# Patient Record
Sex: FEMALE | Race: WHITE | NOT HISPANIC OR LATINO | Employment: OTHER | URBAN - METROPOLITAN AREA
[De-identification: names, ages, dates, MRNs, and addresses within clinical notes are randomized per-mention and may not be internally consistent; named-entity substitution may affect disease eponyms.]

---

## 2018-04-17 ENCOUNTER — HOSPITAL ENCOUNTER (OUTPATIENT)
Dept: MAMMOGRAPHY | Facility: HOSPITAL | Age: 78
Discharge: HOME | End: 2018-04-17
Attending: NURSE PRACTITIONER | Admitting: NURSE PRACTITIONER

## 2018-07-30 ENCOUNTER — EPISODE CHANGES (OUTPATIENT)
Dept: CASE MANAGEMENT | Facility: OTHER | Age: 78
End: 2018-07-30

## 2018-07-30 ENCOUNTER — PATIENT OUTREACH (OUTPATIENT)
Dept: CASE MANAGEMENT | Facility: OTHER | Age: 78
End: 2018-07-30

## 2018-07-30 NOTE — OUTREACH NOTE
Care Management Plan 7/30/2018   Lifestyle Goals Decrease falls risk;Eat a healthy diet   Barriers Disease education   Self Management Get flu/pneumonia shot   Annual Wellness Visit:  Patient Will Schedule   Annual Wellness Visit:  Patient alert has been added to [facility] indicating patient is due AWV   Care Gaps Addressed Other (See Comment)   Care Gaps Addressed Care Gaps to be addressed with patient in future sessions.   Specific Disease Process Teaching Heart Disease;Hypertension   Advanced Directives: Not Interested At This Time   Ed Visits past 12 months: 1   Hospitalizations past 12 months 1   Discharged From: [facility]   Discharged to: Home / Self Care   Admit Date: 07/17/2018   Discharge Date: 07/18/2018   Discharge destination: Home     The main concerns and/or symptoms the patient would like to address are: Disease Education.    Education/instruction provided by Care Coordinator: Short session with patient.  Assessment and care plan initiated this date.  Future sessions to include discussion of care gaps, hypertension and heart disease.      Follow Up Outreach Due: Routine    Annika Acevedo RN

## 2019-02-04 ENCOUNTER — HOSPITAL ENCOUNTER (OUTPATIENT)
Dept: PREADMISSION TESTING | Facility: HOSPITAL | Age: 79
Discharge: HOME | End: 2019-02-04
Attending: ORTHOPAEDIC SURGERY | Admitting: ORTHOPAEDIC SURGERY

## 2019-02-04 LAB
ABO + RH BLD: NORMAL
ANION GAP SERPL CALC-SCNC: 14.4 MMOL/L (ref 10–20)
APTT BLD: 24.5 SEC (ref 24–31)
ARMBAND: NORMAL
BACTERIA SPEC AEROBE CULT: NORMAL
BASOPHILS # BLD AUTO: 0.1 10*3/UL (ref 0–0.2)
BASOPHILS NFR BLD AUTO: 1 % (ref 0–2)
BILIRUB UR QL STRIP: NEGATIVE MG/DL
BLD COMPONENT TYPE: NORMAL
BLD GP AB SCN SERPL QL: NEGATIVE
BUN SERPL-MCNC: 11 MG/DL (ref 8–20)
BUN/CREAT SERPL: 12.2 (ref 5.4–26.2)
CALCIUM SERPL-MCNC: 9.3 MG/DL (ref 8.9–10.3)
CASTS URNS QL MICRO: NORMAL /[LPF]
CHLORIDE SERPL-SCNC: 99 MMOL/L (ref 101–111)
COLOR UR: YELLOW
CONV BACTERIA IN URINE MICRO: NEGATIVE
CONV CLARITY OF URINE: CLEAR
CONV CO2: 27 MMOL/L (ref 22–32)
CONV HYALINE CASTS IN URINE MICRO: 0 /[LPF] (ref 0–5)
CONV PROTEIN IN URINE BY AUTOMATED TEST STRIP: NEGATIVE MG/DL
CONV SMALL ROUND CELLS: NORMAL /[HPF]
CONV UROBILINOGEN IN URINE BY AUTOMATED TEST STRIP: 0.2 MG/DL
CREAT UR-MCNC: 0.9 MG/DL (ref 0.4–1)
CROSSMATCH EXPIRATION: NORMAL
CULTURE INDICATED?: NORMAL
DIFFERENTIAL METHOD BLD: (no result)
EOSINOPHIL # BLD AUTO: 0.2 10*3/UL (ref 0–0.3)
EOSINOPHIL # BLD AUTO: 3 % (ref 0–3)
ERYTHROCYTE [DISTWIDTH] IN BLOOD BY AUTOMATED COUNT: 14.7 % (ref 11.5–14.5)
GLUCOSE SERPL-MCNC: 97 MG/DL (ref 65–99)
GLUCOSE UR QL: NEGATIVE MG/DL
HCT VFR BLD AUTO: 45.5 % (ref 35–49)
HGB BLD-MCNC: 15.5 G/DL (ref 12–15)
HGB UR QL STRIP: NEGATIVE
INR PPP: 0.9
KETONES UR QL STRIP: NEGATIVE MG/DL
LEUKOCYTE ESTERASE UR QL STRIP: NEGATIVE
LYMPHOCYTES # BLD AUTO: 1.8 10*3/UL (ref 0.8–4.8)
LYMPHOCYTES NFR BLD AUTO: 27 % (ref 18–42)
Lab: NORMAL
MCH RBC QN AUTO: 30.3 PG (ref 26–32)
MCHC RBC AUTO-ENTMCNC: 34 G/DL (ref 32–36)
MCV RBC AUTO: 89.3 FL (ref 80–94)
MICRO REPORT STATUS: NORMAL
MONOCYTES # BLD AUTO: 0.4 10*3/UL (ref 0.1–1.3)
MONOCYTES NFR BLD AUTO: 6 % (ref 2–11)
NEUTROPHILS # BLD AUTO: 4.3 10*3/UL (ref 2.3–8.6)
NEUTROPHILS NFR BLD AUTO: 63 % (ref 50–75)
NITRITE UR QL STRIP: NEGATIVE
NRBC BLD AUTO-RTO: 0 /100{WBCS}
NRBC/RBC NFR BLD MANUAL: 0 10*3/UL
PH UR STRIP.AUTO: 6.5 [PH] (ref 4.5–8)
PLATELET # BLD AUTO: 198 10*3/UL (ref 150–450)
PMV BLD AUTO: 8.5 FL (ref 7.4–10.4)
POTASSIUM SERPL-SCNC: 3.4 MMOL/L (ref 3.6–5.1)
PROTHROMBIN TIME: 9.5 SEC (ref 9.6–11.7)
RBC # BLD AUTO: 5.1 10*6/UL (ref 4–5.4)
RBC #/AREA URNS HPF: 2 /[HPF] (ref 0–3)
SODIUM SERPL-SCNC: 137 MMOL/L (ref 136–144)
SP GR UR: 1.01 (ref 1–1.03)
SPECIMEN SOURCE: NORMAL
SPERM URNS QL MICRO: NORMAL /[HPF]
SQUAMOUS SPT QL MICRO: 0 /[HPF] (ref 0–5)
UNIDENT CRYS URNS QL MICRO: NORMAL /[HPF]
WBC # BLD AUTO: 6.7 10*3/UL (ref 4.5–11.5)
WBC #/AREA URNS HPF: 0 /[HPF] (ref 0–5)
YEAST SPEC QL WET PREP: NORMAL /[HPF]

## 2019-04-25 ENCOUNTER — HOSPITAL ENCOUNTER (OUTPATIENT)
Dept: LAB | Facility: HOSPITAL | Age: 79
Discharge: HOME | End: 2019-04-25
Attending: NURSE PRACTITIONER | Admitting: NURSE PRACTITIONER

## 2019-04-25 LAB
ALBUMIN SERPL-MCNC: 4 G/DL (ref 3.5–4.8)
ALBUMIN/GLOB SERPL: 1.3 {RATIO} (ref 1–1.7)
ALP SERPL-CCNC: 52 IU/L (ref 32–91)
ALT SERPL-CCNC: 19 IU/L (ref 14–54)
ANION GAP SERPL CALC-SCNC: 12.5 MMOL/L (ref 10–20)
AST SERPL-CCNC: 22 IU/L (ref 15–41)
BASOPHILS # BLD AUTO: 0.1 10*3/UL (ref 0–0.2)
BASOPHILS NFR BLD AUTO: 1 % (ref 0–2)
BILIRUB SERPL-MCNC: 0.6 MG/DL (ref 0.3–1.2)
BUN SERPL-MCNC: 18 MG/DL (ref 8–20)
BUN/CREAT SERPL: 18 (ref 5.4–26.2)
CALCIUM SERPL-MCNC: 9.7 MG/DL (ref 8.9–10.3)
CHLORIDE SERPL-SCNC: 104 MMOL/L (ref 101–111)
CONV CO2: 25 MMOL/L (ref 22–32)
CONV TOTAL PROTEIN: 7.2 G/DL (ref 6.1–7.9)
CREAT UR-MCNC: 1 MG/DL (ref 0.4–1)
CRP SERPL-MCNC: 0.45 MG/DL (ref 0–0.7)
DIFFERENTIAL METHOD BLD: (no result)
EOSINOPHIL # BLD AUTO: 0.2 10*3/UL (ref 0–0.3)
EOSINOPHIL # BLD AUTO: 4 % (ref 0–3)
ERYTHROCYTE [DISTWIDTH] IN BLOOD BY AUTOMATED COUNT: 15.1 % (ref 11.5–14.5)
ERYTHROCYTE [SEDIMENTATION RATE] IN BLOOD BY WESTERGREN METHOD: 17 MM/HR (ref 0–30)
GLOBULIN UR ELPH-MCNC: 3.2 G/DL (ref 2.5–3.8)
GLUCOSE SERPL-MCNC: 102 MG/DL (ref 65–99)
HCT VFR BLD AUTO: 42.4 % (ref 35–49)
HGB BLD-MCNC: 14.1 G/DL (ref 12–15)
LYMPHOCYTES # BLD AUTO: 1.9 10*3/UL (ref 0.8–4.8)
LYMPHOCYTES NFR BLD AUTO: 36 % (ref 18–42)
MCH RBC QN AUTO: 30.4 PG (ref 26–32)
MCHC RBC AUTO-ENTMCNC: 33.3 G/DL (ref 32–36)
MCV RBC AUTO: 91.2 FL (ref 80–94)
MONOCYTES # BLD AUTO: 0.4 10*3/UL (ref 0.1–1.3)
MONOCYTES NFR BLD AUTO: 8 % (ref 2–11)
NEUTROPHILS # BLD AUTO: 2.6 10*3/UL (ref 2.3–8.6)
NEUTROPHILS NFR BLD AUTO: 51 % (ref 50–75)
NRBC BLD AUTO-RTO: 0 /100{WBCS}
NRBC/RBC NFR BLD MANUAL: 0 10*3/UL
PLATELET # BLD AUTO: 202 10*3/UL (ref 150–450)
PMV BLD AUTO: 8.9 FL (ref 7.4–10.4)
POTASSIUM SERPL-SCNC: 3.5 MMOL/L (ref 3.6–5.1)
RBC # BLD AUTO: 4.65 10*6/UL (ref 4–5.4)
SODIUM SERPL-SCNC: 138 MMOL/L (ref 136–144)
WBC # BLD AUTO: 5.2 10*3/UL (ref 4.5–11.5)

## 2019-06-19 ENCOUNTER — OFFICE VISIT (OUTPATIENT)
Dept: FAMILY MEDICINE CLINIC | Facility: CLINIC | Age: 79
End: 2019-06-19

## 2019-06-19 VITALS
DIASTOLIC BLOOD PRESSURE: 81 MMHG | HEIGHT: 62 IN | WEIGHT: 206 LBS | HEART RATE: 75 BPM | SYSTOLIC BLOOD PRESSURE: 145 MMHG | OXYGEN SATURATION: 97 % | BODY MASS INDEX: 37.91 KG/M2 | TEMPERATURE: 98.1 F

## 2019-06-19 DIAGNOSIS — E55.9 VITAMIN D DEFICIENCY: ICD-10-CM

## 2019-06-19 DIAGNOSIS — Z00.00 PREVENTATIVE HEALTH CARE: ICD-10-CM

## 2019-06-19 DIAGNOSIS — E55.9 VITAMIN D DEFICIENCY: Primary | ICD-10-CM

## 2019-06-19 DIAGNOSIS — M06.9 RHEUMATOID ARTHRITIS, INVOLVING UNSPECIFIED SITE, UNSPECIFIED RHEUMATOID FACTOR PRESENCE: ICD-10-CM

## 2019-06-19 DIAGNOSIS — E87.6 HYPOKALEMIA: Primary | ICD-10-CM

## 2019-06-19 DIAGNOSIS — E78.2 MIXED HYPERLIPIDEMIA: ICD-10-CM

## 2019-06-19 PROBLEM — R79.89 ELEVATED SERUM CREATININE: Status: ACTIVE | Noted: 2018-03-27

## 2019-06-19 PROBLEM — R11.0 NAUSEA: Status: ACTIVE | Noted: 2018-07-23

## 2019-06-19 PROBLEM — R53.83 FATIGUE: Status: ACTIVE | Noted: 2018-07-23

## 2019-06-19 PROBLEM — I25.10 CORONARY ARTERY DISEASE: Status: ACTIVE | Noted: 2019-06-19

## 2019-06-19 PROBLEM — E78.5 HYPERLIPIDEMIA: Status: ACTIVE | Noted: 2019-06-19

## 2019-06-19 PROBLEM — E03.9 HYPOTHYROIDISM: Status: ACTIVE | Noted: 2019-06-19

## 2019-06-19 PROBLEM — G47.00 INSOMNIA: Status: ACTIVE | Noted: 2018-03-27

## 2019-06-19 PROBLEM — N32.81 OVERACTIVE BLADDER: Status: ACTIVE | Noted: 2018-03-27

## 2019-06-19 PROCEDURE — 99214 OFFICE O/P EST MOD 30 MIN: CPT | Performed by: NURSE PRACTITIONER

## 2019-06-19 RX ORDER — OMEPRAZOLE 20 MG/1
20 TABLET, DELAYED RELEASE ORAL EVERY 24 HOURS
COMMUNITY
Start: 2018-07-23 | End: 2022-02-28

## 2019-06-19 RX ORDER — LEVOTHYROXINE SODIUM 0.05 MG/1
50 TABLET ORAL DAILY
Refills: 0 | COMMUNITY
Start: 2019-03-25 | End: 2019-09-19 | Stop reason: SDUPTHER

## 2019-06-19 RX ORDER — LISINOPRIL AND HYDROCHLOROTHIAZIDE 12.5; 1 MG/1; MG/1
TABLET ORAL EVERY 24 HOURS
COMMUNITY
Start: 2017-08-07 | End: 2019-11-15 | Stop reason: SDUPTHER

## 2019-06-19 RX ORDER — HYDROXYCHLOROQUINE SULFATE 200 MG/1
200 TABLET, FILM COATED ORAL 2 TIMES DAILY
Refills: 0 | COMMUNITY
Start: 2019-04-26 | End: 2020-08-18 | Stop reason: SDUPTHER

## 2019-06-19 RX ORDER — MULTIVIT-MIN/IRON/FOLIC ACID/K 18-600-40
500 CAPSULE ORAL DAILY
COMMUNITY
Start: 2015-05-11

## 2019-06-19 RX ORDER — ONDANSETRON HYDROCHLORIDE 8 MG/1
8 TABLET, FILM COATED ORAL 3 TIMES DAILY PRN
Refills: 0 | COMMUNITY
Start: 2019-03-22 | End: 2022-02-28

## 2019-06-19 RX ORDER — CHOLECALCIFEROL (VITAMIN D3) 125 MCG
5 CAPSULE ORAL DAILY
COMMUNITY
Start: 2018-03-27 | End: 2022-02-28

## 2019-06-19 RX ORDER — MELOXICAM 7.5 MG/1
7.5 TABLET ORAL 2 TIMES DAILY PRN
COMMUNITY
Start: 2018-01-23 | End: 2019-06-19

## 2019-06-19 RX ORDER — RANITIDINE HCL 75 MG
75 TABLET ORAL EVERY 12 HOURS PRN
COMMUNITY
Start: 2018-07-23 | End: 2022-02-28

## 2019-06-19 RX ORDER — MULTIVIT-MIN/IRON/FOLIC ACID/K 18-600-40
4000 CAPSULE ORAL DAILY
COMMUNITY
Start: 2018-10-16 | End: 2019-06-19

## 2019-06-19 RX ORDER — CHOLECALCIFEROL (VITAMIN D3) 125 MCG
4000 CAPSULE ORAL DAILY
Qty: 60 TABLET | Refills: 6 | Status: SHIPPED | OUTPATIENT
Start: 2019-06-19 | End: 2019-06-19

## 2019-06-19 RX ORDER — ACETAMINOPHEN 160 MG
4000 TABLET,DISINTEGRATING ORAL DAILY
Qty: 60 CAPSULE | Refills: 6 | Status: SHIPPED | OUTPATIENT
Start: 2019-06-19 | End: 2020-06-18

## 2019-06-19 RX ORDER — ACETAMINOPHEN 325 MG/1
325 TABLET ORAL EVERY 6 HOURS PRN
COMMUNITY
Start: 2012-06-20

## 2019-06-19 RX ORDER — MULTIVITAMIN
1 CAPSULE ORAL DAILY
COMMUNITY
Start: 2014-06-04

## 2019-06-19 RX ORDER — ROSUVASTATIN CALCIUM 10 MG/1
10 TABLET, COATED ORAL
Refills: 0 | COMMUNITY
Start: 2019-03-25 | End: 2019-12-19 | Stop reason: SDUPTHER

## 2019-06-19 NOTE — PROGRESS NOTES
Subjective   Dannielle Wang is a 79 y.o. female.     78-year-old white female with history of CAD, hypertension, TIA, return arthritis today for six-month follow-up visit.  Patient denies any new problems she has had a left knee replacement since I saw her last which has done very well she is no longer using meloxicam.  Blood pressure 144/80 heart rate 96 she denies any chest pain, dyspnea, tachycardia, dizziness or edema  Weight is unchanged at 206.  Patient had blood work at rheumatology in April showing low potassium and low D3 I am checking  potassium again today and increasing her D3 to 4000 units  Patient is up-to-date on eye exams and DEXA scan's no longer does mammograms Pap smears or colonoscopies.  Fasting blood work due in the fall    D3 4000 units daily  CMP today         The following portions of the patient's history were reviewed and updated as appropriate: allergies, current medications, past family history, past medical history, past social history, past surgical history and problem list.    Review of Systems    Objective   Physical Exam   Constitutional: She is oriented to person, place, and time. She appears well-developed and well-nourished.   Cardiovascular: Normal rate and regular rhythm.   Pulmonary/Chest: Effort normal and breath sounds normal.   Abdominal: Soft. Bowel sounds are normal.   Neurological: She is alert and oriented to person, place, and time.   Skin: Skin is warm and dry.   Psychiatric: She has a normal mood and affect.         Assessment/Plan   Dannielle was seen today for hypertension, hyperlipidemia and hypothyroidism.    Diagnoses and all orders for this visit:    Hypokalemia  -     Comprehensive Metabolic Panel    Vitamin D deficiency    Rheumatoid arthritis, involving unspecified site, unspecified rheumatoid factor presence (CMS/[facility])    Preventative health care    Mixed hyperlipidemia

## 2019-06-20 ENCOUNTER — TELEPHONE (OUTPATIENT)
Dept: FAMILY MEDICINE CLINIC | Facility: CLINIC | Age: 79
End: 2019-06-20

## 2019-06-20 DIAGNOSIS — N28.9 RENAL INSUFFICIENCY: Primary | ICD-10-CM

## 2019-06-20 LAB
ALBUMIN SERPL-MCNC: 4.4 G/DL (ref 3.5–4.7)
ALBUMIN/GLOB SERPL: 1.6 {RATIO} (ref 1.2–2.2)
ALP SERPL-CCNC: 59 IU/L (ref 39–117)
ALT SERPL-CCNC: 19 IU/L (ref 0–32)
AST SERPL-CCNC: 20 IU/L (ref 0–40)
BILIRUB SERPL-MCNC: 0.5 MG/DL (ref 0–1.2)
BUN SERPL-MCNC: 15 MG/DL (ref 8–27)
BUN/CREAT SERPL: 14 (ref 12–28)
CALCIUM SERPL-MCNC: 9.5 MG/DL (ref 8.7–10.3)
CHLORIDE SERPL-SCNC: 102 MMOL/L (ref 96–106)
CO2 SERPL-SCNC: 25 MMOL/L (ref 20–29)
CREAT SERPL-MCNC: 1.08 MG/DL (ref 0.57–1)
GLOBULIN SER CALC-MCNC: 2.8 G/DL (ref 1.5–4.5)
GLUCOSE SERPL-MCNC: 87 MG/DL (ref 65–99)
POTASSIUM SERPL-SCNC: 3.9 MMOL/L (ref 3.5–5.2)
PROT SERPL-MCNC: 7.2 G/DL (ref 6–8.5)
SODIUM SERPL-SCNC: 143 MMOL/L (ref 134–144)

## 2019-07-21 ENCOUNTER — RESULTS ENCOUNTER (OUTPATIENT)
Dept: FAMILY MEDICINE CLINIC | Facility: CLINIC | Age: 79
End: 2019-07-21

## 2019-07-21 DIAGNOSIS — N28.9 RENAL INSUFFICIENCY: ICD-10-CM

## 2019-08-27 ENCOUNTER — OFFICE VISIT (OUTPATIENT)
Dept: CARDIOLOGY | Facility: CLINIC | Age: 79
End: 2019-08-27

## 2019-08-27 VITALS
BODY MASS INDEX: 35 KG/M2 | DIASTOLIC BLOOD PRESSURE: 80 MMHG | SYSTOLIC BLOOD PRESSURE: 143 MMHG | OXYGEN SATURATION: 97 % | HEART RATE: 75 BPM | WEIGHT: 205 LBS | HEIGHT: 64 IN

## 2019-08-27 DIAGNOSIS — I63.40 CEREBROVASCULAR ACCIDENT (CVA) DUE TO EMBOLISM OF CEREBRAL ARTERY (HCC): ICD-10-CM

## 2019-08-27 DIAGNOSIS — E78.00 PURE HYPERCHOLESTEROLEMIA: ICD-10-CM

## 2019-08-27 DIAGNOSIS — I25.118 CORONARY ARTERY DISEASE OF NATIVE ARTERY OF NATIVE HEART WITH STABLE ANGINA PECTORIS (HCC): Primary | ICD-10-CM

## 2019-08-27 DIAGNOSIS — I10 ESSENTIAL HYPERTENSION: ICD-10-CM

## 2019-08-27 PROCEDURE — 99213 OFFICE O/P EST LOW 20 MIN: CPT | Performed by: INTERNAL MEDICINE

## 2019-08-27 NOTE — PROGRESS NOTES
"    Subjective:     Encounter Date:08/27/2019      Patient ID: Dannielle Wang is a 79 y.o. female.    Chief Complaint:  History of Present Illness 79-year-old white female with history of coronary artery disease history of hypertension hyperlipidemia and CVA presents to my office for follow-up.  Patient is currently stable without any symptoms of chest pain or shortness of breath at rest on exertion.  No complaints of any PND orthopnea.  No palpitations dizziness syncope or swelling of the feet.  Patient has been taking all the medicines regularly.  Patient does not smoke.  She is trying to follow a good diet.    The following portions of the patient's history were reviewed and updated as appropriate: allergies, current medications, past family history, past medical history, past social history, past surgical history and problem list.  Past Medical History:   Diagnosis Date   • Coronary artery disease    • Hyperlipidemia    • Hypertension    • Hypothyroidism      Past Surgical History:   Procedure Laterality Date   • JOINT REPLACEMENT     • REPLACEMENT TOTAL KNEE       /80 (BP Location: Left arm, Patient Position: Sitting)   Pulse 75   Ht 162.6 cm (64\")   Wt 93 kg (205 lb)   LMP  (LMP Unknown)   SpO2 97%   BMI 35.19 kg/m²   Family History   Problem Relation Age of Onset   • Hypertension Mother    • Stroke Mother    • Heart disease Father        Current Outpatient Medications:   •  acetaminophen (TYLENOL) 325 MG tablet, Take 325 mg by mouth Every 6 (Six) Hours As Needed., Disp: , Rfl:   •  Ascorbic Acid (VITAMIN C) 500 MG capsule, Take 500 mg by mouth Daily., Disp: , Rfl:   •  calcium carbonate-vitamin d 600-400 MG-UNIT per tablet, Take 600 mg by mouth 2 (Two) Times a Day., Disp: , Rfl:   •  Cholecalciferol (VITAMIN D3) 2000 units capsule, Take 2 capsules by mouth Daily., Disp: 60 capsule, Rfl: 6  •  hydroxychloroquine (PLAQUENIL) 200 MG tablet, Take 200 mg by mouth 2 (Two) Times a Day., Disp: , Rfl: " 0  •  levothyroxine (SYNTHROID, LEVOTHROID) 50 MCG tablet, Take 50 mcg by mouth Daily., Disp: , Rfl: 0  •  lisinopril-hydrochlorothiazide (PRINZIDE,ZESTORETIC) 10-12.5 MG per tablet, Daily., Disp: , Rfl:   •  Multiple Vitamin (MULTIVITAMIN) capsule, Take 1 capsule by mouth Daily., Disp: , Rfl:   •  raNITIdine (ZANTAC) 75 MG tablet, Take 75 mg by mouth Every 12 (Twelve) Hours As Needed., Disp: , Rfl:   •  rosuvastatin (CRESTOR) 10 MG tablet, Take 10 mg by mouth every night at bedtime., Disp: , Rfl: 0  •  melatonin 5 MG tablet tablet, Take 5 mg by mouth Daily., Disp: , Rfl:   •  omeprazole OTC (PRILOSEC OTC) 20 MG EC tablet, Take 20 mg by mouth Daily., Disp: , Rfl:   •  ondansetron (ZOFRAN) 8 MG tablet, Take 8 mg by mouth 3 (Three) Times a Day As Needed., Disp: , Rfl: 0  Allergies   Allergen Reactions   • Atorvastatin Calcium Myalgia   • Cephalexin Unknown (See Comments)     Social History     Socioeconomic History   • Marital status:      Spouse name: Not on file   • Number of children: Not on file   • Years of education: Not on file   • Highest education level: Not on file   Tobacco Use   • Smoking status: Never Smoker   • Smokeless tobacco: Never Used   Substance and Sexual Activity   • Alcohol use: No     Frequency: Never   • Drug use: No   • Sexual activity: No     Review of Systems   Constitution: Negative for fever and malaise/fatigue.   Cardiovascular: Negative for chest pain, dyspnea on exertion and palpitations.   Respiratory: Negative for cough and shortness of breath.    Skin: Negative for rash.   Gastrointestinal: Negative for abdominal pain, nausea and vomiting.   Neurological: Negative for focal weakness and headaches.   All other systems reviewed and are negative.             Objective:     Physical Exam   Constitutional: She appears well-developed and well-nourished.   HENT:   Head: Normocephalic and atraumatic.   Eyes: Conjunctivae are normal. No scleral icterus.   Neck: Normal range of  motion. Neck supple. No JVD present. Carotid bruit is not present.   Cardiovascular: Normal rate, regular rhythm, S1 normal, S2 normal, normal heart sounds and intact distal pulses. PMI is not displaced.   Pulmonary/Chest: Effort normal and breath sounds normal. She has no wheezes. She has no rales.   Abdominal: Soft. Bowel sounds are normal.   Neurological: She is alert. She has normal strength.   Skin: Skin is warm and dry. No rash noted.     Procedures    Lab Review:       Assessment:          Diagnosis Plan   1. Coronary artery disease of native artery of native heart with stable angina pectoris (CMS/HCC)     2. Essential hypertension     3. Pure hypercholesterolemia     4. Cerebrovascular accident (CVA) due to embolism of cerebral artery (CMS/HCC)            Plan:       Patient has nonobstructive coronary disease and is currently stable on medications per  Patient blood pressure and heart rate are stable per  Patient lipid levels are followed by the primary care doctor.  Patient has history of TIA/CVA in the past but does not have any residual deficits.  Continue current medicines and follow in 1 year

## 2019-09-19 RX ORDER — LEVOTHYROXINE SODIUM 0.05 MG/1
TABLET ORAL
Qty: 90 TABLET | Refills: 1 | Status: SHIPPED | OUTPATIENT
Start: 2019-09-19 | End: 2020-03-18

## 2019-10-17 DIAGNOSIS — M06.9 RHEUMATOID ARTHRITIS, INVOLVING UNSPECIFIED SITE, UNSPECIFIED RHEUMATOID FACTOR PRESENCE: Primary | ICD-10-CM

## 2019-11-15 RX ORDER — LISINOPRIL AND HYDROCHLOROTHIAZIDE 12.5; 1 MG/1; MG/1
1 TABLET ORAL DAILY
Qty: 90 TABLET | Refills: 0 | Status: SHIPPED | OUTPATIENT
Start: 2019-11-15 | End: 2020-02-13 | Stop reason: SDUPTHER

## 2019-12-19 ENCOUNTER — OFFICE VISIT (OUTPATIENT)
Dept: FAMILY MEDICINE CLINIC | Facility: CLINIC | Age: 79
End: 2019-12-19

## 2019-12-19 VITALS
OXYGEN SATURATION: 95 % | BODY MASS INDEX: 38.83 KG/M2 | SYSTOLIC BLOOD PRESSURE: 132 MMHG | HEIGHT: 62 IN | DIASTOLIC BLOOD PRESSURE: 72 MMHG | TEMPERATURE: 98.3 F | HEART RATE: 86 BPM | WEIGHT: 211 LBS

## 2019-12-19 DIAGNOSIS — Z23 FLU VACCINE NEED: Primary | ICD-10-CM

## 2019-12-19 DIAGNOSIS — E03.9 ACQUIRED HYPOTHYROIDISM: ICD-10-CM

## 2019-12-19 DIAGNOSIS — E66.01 MORBIDLY OBESE (HCC): ICD-10-CM

## 2019-12-19 DIAGNOSIS — I25.118 CORONARY ARTERY DISEASE OF NATIVE ARTERY OF NATIVE HEART WITH STABLE ANGINA PECTORIS (HCC): ICD-10-CM

## 2019-12-19 DIAGNOSIS — E55.9 VITAMIN D DEFICIENCY: ICD-10-CM

## 2019-12-19 PROCEDURE — 90653 IIV ADJUVANT VACCINE IM: CPT | Performed by: NURSE PRACTITIONER

## 2019-12-19 PROCEDURE — 99213 OFFICE O/P EST LOW 20 MIN: CPT | Performed by: NURSE PRACTITIONER

## 2019-12-19 PROCEDURE — G0008 ADMIN INFLUENZA VIRUS VAC: HCPCS | Performed by: NURSE PRACTITIONER

## 2019-12-19 RX ORDER — ROSUVASTATIN CALCIUM 10 MG/1
TABLET, COATED ORAL
Qty: 90 TABLET | Refills: 2 | Status: SHIPPED | OUTPATIENT
Start: 2019-12-19 | End: 2019-12-23 | Stop reason: SDUPTHER

## 2019-12-19 RX ORDER — ROSUVASTATIN CALCIUM 10 MG/1
10 TABLET, COATED ORAL
Qty: 30 TABLET | Refills: 2 | Status: SHIPPED | OUTPATIENT
Start: 2019-12-19 | End: 2020-03-16 | Stop reason: SDUPTHER

## 2019-12-19 NOTE — PROGRESS NOTES
Subjective   Dannielle Wang is a 79 y.o. female.      79-year-old white female with history of CAD, hypertension, TIA, arthritis, who comes in today for follow-up visit.  Patient has no new complaints.  She has had a find a new rheumatologist and she is going to 1 over in [place] now  Blood pressure 136/72 heart rate 86 she denies any chest pain, dyspnea, tachycardia dizziness since he  Weight is up 5 lb at 211.  We are checking her vitamin D level today thyroid levels and creatinine which was 1.08 slightly elevated last visit  Patient is up-to-date on DEXA scan , eye exam and flu shot but she no longer does mammograms Pap smears and refuses colonoscopies      blood work today   keep all appointments with Endocrinology       The following portions of the patient's history were reviewed and updated as appropriate: allergies, current medications, past family history, past medical history, past social history, past surgical history and problem list.    Review of Systems   Constitutional: Negative.    HENT: Negative.    Respiratory: Negative.    Cardiovascular: Negative.    Gastrointestinal: Negative.    Genitourinary: Negative.    Musculoskeletal: Negative.    Skin: Negative.    Neurological: Negative.    Psychiatric/Behavioral: Negative.        Objective   Physical Exam   Constitutional: She is oriented to person, place, and time. She appears well-developed and well-nourished.   Cardiovascular: Normal rate and regular rhythm.   Pulmonary/Chest: Effort normal and breath sounds normal.   Abdominal: Soft. Bowel sounds are normal.   Neurological: She is alert and oriented to person, place, and time.   Skin: Skin is warm and dry.   Psychiatric: She has a normal mood and affect.         Assessment/Plan   Dannielle was seen today for hypertension.    Diagnoses and all orders for this visit:    Flu vaccine need  -     Fluad Quad >65 years    Acquired hypothyroidism  -     Cancel: T3  -     Cancel: TSH+Free T4  -      Comprehensive metabolic panel  -     TSH  -     T4  -     T3, free    Vitamin D deficiency  -     Cancel: Vitamin D 1,25 Dihydroxy  -     Vitamin D 25 hydroxy    Morbidly obese (CMS/HCC)    Coronary artery disease of native artery of native heart with stable angina pectoris (CMS/HCC)  -     Comprehensive metabolic panel    Other orders  -     rosuvastatin (CRESTOR) 10 MG tablet; Take 1 tablet by mouth every night at bedtime.

## 2019-12-20 LAB
25(OH)D3+25(OH)D2 SERPL-MCNC: 33.5 NG/ML (ref 30–100)
ALBUMIN SERPL-MCNC: 4.5 G/DL (ref 3.5–4.7)
ALBUMIN/GLOB SERPL: 1.7 {RATIO} (ref 1.2–2.2)
ALP SERPL-CCNC: 55 IU/L (ref 39–117)
ALT SERPL-CCNC: 20 IU/L (ref 0–32)
AST SERPL-CCNC: 22 IU/L (ref 0–40)
BILIRUB SERPL-MCNC: 0.4 MG/DL (ref 0–1.2)
BUN SERPL-MCNC: 15 MG/DL (ref 8–27)
BUN/CREAT SERPL: 14 (ref 12–28)
CALCIUM SERPL-MCNC: 10.1 MG/DL (ref 8.7–10.3)
CHLORIDE SERPL-SCNC: 101 MMOL/L (ref 96–106)
CO2 SERPL-SCNC: 26 MMOL/L (ref 20–29)
CREAT SERPL-MCNC: 1.05 MG/DL (ref 0.57–1)
GLOBULIN SER CALC-MCNC: 2.7 G/DL (ref 1.5–4.5)
GLUCOSE SERPL-MCNC: 111 MG/DL (ref 65–99)
POTASSIUM SERPL-SCNC: 3.9 MMOL/L (ref 3.5–5.2)
PROT SERPL-MCNC: 7.2 G/DL (ref 6–8.5)
SODIUM SERPL-SCNC: 144 MMOL/L (ref 134–144)
T3FREE SERPL-MCNC: 2.7 PG/ML (ref 2–4.4)
T4 SERPL-MCNC: 9.1 UG/DL (ref 4.5–12)
TSH SERPL DL<=0.005 MIU/L-ACNC: 1.68 UIU/ML (ref 0.45–4.5)

## 2019-12-23 RX ORDER — ROSUVASTATIN CALCIUM 10 MG/1
10 TABLET, COATED ORAL DAILY
Qty: 90 TABLET | Refills: 2 | Status: SHIPPED | OUTPATIENT
Start: 2019-12-23 | End: 2019-12-23 | Stop reason: SDUPTHER

## 2020-02-13 RX ORDER — LISINOPRIL AND HYDROCHLOROTHIAZIDE 12.5; 1 MG/1; MG/1
1 TABLET ORAL DAILY
Qty: 90 TABLET | Refills: 0 | Status: SHIPPED | OUTPATIENT
Start: 2020-02-13 | End: 2020-05-18

## 2020-03-16 RX ORDER — ROSUVASTATIN CALCIUM 10 MG/1
10 TABLET, COATED ORAL
Qty: 90 TABLET | Refills: 1 | Status: SHIPPED | OUTPATIENT
Start: 2020-03-16 | End: 2020-09-11

## 2020-03-18 ENCOUNTER — TELEPHONE (OUTPATIENT)
Dept: FAMILY MEDICINE CLINIC | Facility: CLINIC | Age: 80
End: 2020-03-18

## 2020-03-18 RX ORDER — LEVOTHYROXINE SODIUM 0.05 MG/1
TABLET ORAL
Qty: 90 TABLET | Refills: 1 | Status: SHIPPED | OUTPATIENT
Start: 2020-03-18 | End: 2020-09-11

## 2020-05-18 RX ORDER — LISINOPRIL AND HYDROCHLOROTHIAZIDE 12.5; 1 MG/1; MG/1
TABLET ORAL
Qty: 90 TABLET | Refills: 1 | Status: SHIPPED | OUTPATIENT
Start: 2020-05-18 | End: 2020-12-08

## 2020-06-25 ENCOUNTER — OFFICE VISIT (OUTPATIENT)
Dept: FAMILY MEDICINE CLINIC | Facility: CLINIC | Age: 80
End: 2020-06-25

## 2020-06-25 VITALS
WEIGHT: 213 LBS | HEIGHT: 62 IN | HEART RATE: 82 BPM | TEMPERATURE: 98.6 F | OXYGEN SATURATION: 95 % | DIASTOLIC BLOOD PRESSURE: 84 MMHG | BODY MASS INDEX: 39.2 KG/M2 | SYSTOLIC BLOOD PRESSURE: 152 MMHG

## 2020-06-25 DIAGNOSIS — M85.80 OSTEOPENIA, UNSPECIFIED LOCATION: ICD-10-CM

## 2020-06-25 DIAGNOSIS — Z00.00 PREVENTATIVE HEALTH CARE: ICD-10-CM

## 2020-06-25 DIAGNOSIS — Z78.0 POSTMENOPAUSAL: ICD-10-CM

## 2020-06-25 DIAGNOSIS — E78.2 MIXED HYPERLIPIDEMIA: Primary | ICD-10-CM

## 2020-06-25 DIAGNOSIS — E03.9 ACQUIRED HYPOTHYROIDISM: ICD-10-CM

## 2020-06-25 PROCEDURE — 99213 OFFICE O/P EST LOW 20 MIN: CPT | Performed by: NURSE PRACTITIONER

## 2020-06-25 NOTE — PATIENT INSTRUCTIONS
Fasting blood work  Keep appointment for DEXA scan  Continue to monitor blood pressure at home  Follow-up 6 months fasting

## 2020-06-25 NOTE — PROGRESS NOTES
"    Dannielle Wang is a 80 y.o. female.     80-year-old white female with history of CAD, hypertension, TIA, arthritis, bunions who comes in today for follow-up visit  Blood pressure 152/84 heart rate 82 with murmur she denies any chest pain, dyspnea, tachycardia or dizziness.  Patient states her pressure at home runs in the 120s 130s over 70s does not want any intervention for blood pressure today  Weight is up 2 pounds at 213 we discussed diet and weight loss  We are scheduling a DEXA scan for the patient she is up-to-date on eye exams no longer does mammograms or colonoscopies    Fasting blood work  DEXA scan  Follow-up fasting 6 months       The following portions of the patient's history were reviewed and updated as appropriate: allergies, current medications, past family history, past medical history, past social history, past surgical history and problem list.    Vitals:    06/25/20 1400   BP: 152/84   BP Location: Right arm   Patient Position: Sitting   Cuff Size: Large Adult   Pulse: 82   Temp: 98.6 °F (37 °C)   TempSrc: Temporal   SpO2: 95%   Weight: 96.6 kg (213 lb)   Height: 157.5 cm (62\")     Body mass index is 38.96 kg/m².    Past Medical History:   Diagnosis Date   • Coronary artery disease    • Hyperlipidemia    • Hypertension    • Hypothyroidism      Past Surgical History:   Procedure Laterality Date   • JOINT REPLACEMENT     • REPLACEMENT TOTAL KNEE       Family History   Problem Relation Age of Onset   • Hypertension Mother    • Stroke Mother    • Heart disease Father      Immunization History   Administered Date(s) Administered   • Fluad Quad 12/19/2019   • Fluzone High Dose =>65 Years ([employer] ONLY) 09/26/2016, 10/16/2018   • PPD Test 06/02/2004       Office Visit on 12/19/2019   Component Date Value Ref Range Status   • Glucose 12/19/2019 111* 65 - 99 mg/dL Final   • BUN 12/19/2019 15  8 - 27 mg/dL Final   • Creatinine 12/19/2019 1.05* 0.57 - 1.00 mg/dL Final   • eGFR Non African Am " 12/19/2019 50* >59 mL/min/1.73 Final   • eGFR African Am 12/19/2019 58* >59 mL/min/1.73 Final   • BUN/Creatinine Ratio 12/19/2019 14  12 - 28 Final   • Sodium 12/19/2019 144  134 - 144 mmol/L Final   • Potassium 12/19/2019 3.9  3.5 - 5.2 mmol/L Final   • Chloride 12/19/2019 101  96 - 106 mmol/L Final   • Total CO2 12/19/2019 26  20 - 29 mmol/L Final   • Calcium 12/19/2019 10.1  8.7 - 10.3 mg/dL Final   • Total Protein 12/19/2019 7.2  6.0 - 8.5 g/dL Final   • Albumin 12/19/2019 4.5  3.5 - 4.7 g/dL Final   • Globulin 12/19/2019 2.7  1.5 - 4.5 g/dL Final   • A/G Ratio 12/19/2019 1.7  1.2 - 2.2 Final   • Total Bilirubin 12/19/2019 0.4  0.0 - 1.2 mg/dL Final   • Alkaline Phosphatase 12/19/2019 55  39 - 117 IU/L Final   • AST (SGOT) 12/19/2019 22  0 - 40 IU/L Final   • ALT (SGPT) 12/19/2019 20  0 - 32 IU/L Final   • TSH 12/19/2019 1.680  0.450 - 4.500 uIU/mL Final   • T4, Total 12/19/2019 9.1  4.5 - 12.0 ug/dL Final   • T3, Free 12/19/2019 2.7  2.0 - 4.4 pg/mL Final   • 25 Hydroxy, Vitamin D 12/19/2019 33.5  30.0 - 100.0 ng/mL Final    Comment: Vitamin D deficiency has been defined by the [place] of  Medicine and an Endocrine Society practice guideline as a  level of serum 25-OH vitamin D less than 20 ng/mL (1,2).  The Endocrine Society went on to further define vitamin D  insufficiency as a level between 21 and 29 ng/mL (2).  1. IOM ([place] of [employer]). 2010. Dietary reference     intakes for calcium and D. [place]: The     National Academies Press.  2. Brendan MF, Juan José TERRY, Casper ROSS, et al.     Evaluation, treatment, and prevention of vitamin D     deficiency: an Endocrine Society clinical practice     guideline. JCEM. 2011 Jul; 96(7):1911-30.           Review of Systems   Constitutional: Negative.    HENT: Negative.    Respiratory: Negative.    Cardiovascular: Negative.    Gastrointestinal: Negative.    Genitourinary: Negative.    Musculoskeletal: Negative.    Skin: Negative.    Neurological:  Negative.    Psychiatric/Behavioral: Negative.        Objective   Physical Exam   Constitutional: She is oriented to person, place, and time. She appears well-developed and well-nourished.   Cardiovascular: Normal rate and regular rhythm.   Pulmonary/Chest: Breath sounds normal.   Abdominal: Soft. Bowel sounds are normal.   Musculoskeletal: Normal range of motion.   Neurological: She is alert and oriented to person, place, and time.   Skin: Skin is warm and dry.   Psychiatric: She has a normal mood and affect.       Procedures    Assessment/Plan   Dannielle was seen today for hyperlipidemia and hypothyroidism.    Diagnoses and all orders for this visit:    Mixed hyperlipidemia  -     Lipid Panel With LDL / HDL Ratio    Acquired hypothyroidism  -     TSH+Free T4  -     T3    Preventative health care  -     CBC & Differential  -     Comprehensive Metabolic Panel    Osteopenia, unspecified location    BMI 39.0-39.9,adult          Current Outpatient Medications:   •  acetaminophen (TYLENOL) 325 MG tablet, Take 325 mg by mouth Every 6 (Six) Hours As Needed., Disp: , Rfl:   •  Ascorbic Acid (VITAMIN C) 500 MG capsule, Take 500 mg by mouth Daily., Disp: , Rfl:   •  calcium carbonate-vitamin d 600-400 MG-UNIT per tablet, Take 600 mg by mouth 2 (Two) Times a Day., Disp: , Rfl:   •  hydroxychloroquine (PLAQUENIL) 200 MG tablet, Take 200 mg by mouth 2 (Two) Times a Day., Disp: , Rfl: 0  •  levothyroxine (SYNTHROID, LEVOTHROID) 50 MCG tablet, TAKE 1 TABLET BY MOUTH ONCE DAILY, Disp: 90 tablet, Rfl: 1  •  lisinopril-hydrochlorothiazide (PRINZIDE,ZESTORETIC) 10-12.5 MG per tablet, TAKE 1 TABLET BY MOUTH ONCE DAILY, Disp: 90 tablet, Rfl: 1  •  melatonin 5 MG tablet tablet, Take 5 mg by mouth Daily., Disp: , Rfl:   •  Multiple Vitamin (MULTIVITAMIN) capsule, Take 1 capsule by mouth Daily., Disp: , Rfl:   •  omeprazole OTC (PRILOSEC OTC) 20 MG EC tablet, Take 20 mg by mouth Daily., Disp: , Rfl:   •  ondansetron (ZOFRAN) 8 MG tablet,  Take 8 mg by mouth 3 (Three) Times a Day As Needed., Disp: , Rfl: 0  •  raNITIdine (ZANTAC) 75 MG tablet, Take 75 mg by mouth Every 12 (Twelve) Hours As Needed., Disp: , Rfl:   •  rosuvastatin (CRESTOR) 10 MG tablet, Take 1 tablet by mouth every night at bedtime., Disp: 90 tablet, Rfl: 1

## 2020-06-26 LAB
ALBUMIN SERPL-MCNC: 4.4 G/DL (ref 3.6–4.6)
ALBUMIN/GLOB SERPL: 1.7 {RATIO} (ref 1.2–2.2)
ALP SERPL-CCNC: 50 IU/L (ref 39–117)
ALT SERPL-CCNC: 22 IU/L (ref 0–32)
AST SERPL-CCNC: 24 IU/L (ref 0–40)
BASOPHILS # BLD AUTO: 0.1 X10E3/UL (ref 0–0.2)
BASOPHILS NFR BLD AUTO: 1 %
BILIRUB SERPL-MCNC: 0.4 MG/DL (ref 0–1.2)
BUN SERPL-MCNC: 10 MG/DL (ref 8–27)
BUN/CREAT SERPL: 10 (ref 12–28)
CALCIUM SERPL-MCNC: 9.7 MG/DL (ref 8.7–10.3)
CHLORIDE SERPL-SCNC: 102 MMOL/L (ref 96–106)
CHOLEST SERPL-MCNC: 166 MG/DL (ref 100–199)
CO2 SERPL-SCNC: 25 MMOL/L (ref 20–29)
CREAT SERPL-MCNC: 1.05 MG/DL (ref 0.57–1)
EOSINOPHIL # BLD AUTO: 0.2 X10E3/UL (ref 0–0.4)
EOSINOPHIL NFR BLD AUTO: 3 %
ERYTHROCYTE [DISTWIDTH] IN BLOOD BY AUTOMATED COUNT: 13.5 % (ref 11.7–15.4)
GLOBULIN SER CALC-MCNC: 2.6 G/DL (ref 1.5–4.5)
GLUCOSE SERPL-MCNC: 84 MG/DL (ref 65–99)
HCT VFR BLD AUTO: 44.6 % (ref 34–46.6)
HDLC SERPL-MCNC: 65 MG/DL
HGB BLD-MCNC: 14.7 G/DL (ref 11.1–15.9)
IMM GRANULOCYTES # BLD AUTO: 0 X10E3/UL (ref 0–0.1)
IMM GRANULOCYTES NFR BLD AUTO: 0 %
LDLC SERPL CALC-MCNC: 77 MG/DL (ref 0–99)
LDLC/HDLC SERPL: 1.2 RATIO (ref 0–3.2)
LYMPHOCYTES # BLD AUTO: 2.3 X10E3/UL (ref 0.7–3.1)
LYMPHOCYTES NFR BLD AUTO: 38 %
MCH RBC QN AUTO: 30.7 PG (ref 26.6–33)
MCHC RBC AUTO-ENTMCNC: 33 G/DL (ref 31.5–35.7)
MCV RBC AUTO: 93 FL (ref 79–97)
MONOCYTES # BLD AUTO: 0.4 X10E3/UL (ref 0.1–0.9)
MONOCYTES NFR BLD AUTO: 6 %
NEUTROPHILS # BLD AUTO: 3 X10E3/UL (ref 1.4–7)
NEUTROPHILS NFR BLD AUTO: 52 %
PLATELET # BLD AUTO: 213 X10E3/UL (ref 150–450)
POTASSIUM SERPL-SCNC: 4 MMOL/L (ref 3.5–5.2)
PROT SERPL-MCNC: 7 G/DL (ref 6–8.5)
RBC # BLD AUTO: 4.79 X10E6/UL (ref 3.77–5.28)
SODIUM SERPL-SCNC: 143 MMOL/L (ref 134–144)
T3 SERPL-MCNC: 127 NG/DL (ref 71–180)
T4 FREE SERPL-MCNC: 1.44 NG/DL (ref 0.82–1.77)
TRIGL SERPL-MCNC: 122 MG/DL (ref 0–149)
TSH SERPL DL<=0.005 MIU/L-ACNC: 1.21 UIU/ML (ref 0.45–4.5)
VLDLC SERPL CALC-MCNC: 24 MG/DL (ref 5–40)
WBC # BLD AUTO: 6 X10E3/UL (ref 3.4–10.8)

## 2020-07-01 ENCOUNTER — APPOINTMENT (OUTPATIENT)
Dept: BONE DENSITY | Facility: HOSPITAL | Age: 80
End: 2020-07-01

## 2020-08-10 ENCOUNTER — APPOINTMENT (OUTPATIENT)
Dept: BONE DENSITY | Facility: HOSPITAL | Age: 80
End: 2020-08-10

## 2020-08-18 ENCOUNTER — OFFICE VISIT (OUTPATIENT)
Dept: CARDIOLOGY | Facility: CLINIC | Age: 80
End: 2020-08-18

## 2020-08-18 VITALS
BODY MASS INDEX: 38.83 KG/M2 | SYSTOLIC BLOOD PRESSURE: 146 MMHG | WEIGHT: 211 LBS | DIASTOLIC BLOOD PRESSURE: 76 MMHG | HEIGHT: 62 IN | HEART RATE: 72 BPM | OXYGEN SATURATION: 95 %

## 2020-08-18 DIAGNOSIS — E78.00 PURE HYPERCHOLESTEROLEMIA: ICD-10-CM

## 2020-08-18 DIAGNOSIS — I25.10 CORONARY ARTERY DISEASE INVOLVING NATIVE CORONARY ARTERY OF NATIVE HEART WITHOUT ANGINA PECTORIS: Primary | ICD-10-CM

## 2020-08-18 DIAGNOSIS — I10 ESSENTIAL HYPERTENSION: ICD-10-CM

## 2020-08-18 PROCEDURE — 99213 OFFICE O/P EST LOW 20 MIN: CPT | Performed by: INTERNAL MEDICINE

## 2020-08-18 RX ORDER — HYDROXYCHLOROQUINE SULFATE 200 MG/1
200 TABLET, FILM COATED ORAL 2 TIMES DAILY
Qty: 180 TABLET | Refills: 1 | Status: SHIPPED | OUTPATIENT
Start: 2020-08-18 | End: 2020-11-05 | Stop reason: SDUPTHER

## 2020-08-18 NOTE — PROGRESS NOTES
"    Subjective:     Encounter Date:08/18/2020      Patient ID: Dannielle Wang is a 80 y.o. female.    Chief Complaint: Coronary Artery Disease  History of Present Illness 80-year-old white female with history of coronary disease history of hypertension hyperlipidemia presents to my office for follow-up.  Patient is currently stable without any symptoms of chest pain or shortness of breath at rest or exertion.  No complains of any PND orthopnea.  No palpitation dizziness syncope or swelling of the feet.  Patient has been taking all the medicines regularly.  Patient does not smoke.  Patient is trying to exercise regularly she follows a good diet.    The following portions of the patient's history were reviewed and updated as appropriate: allergies, current medications, past family history, past medical history, past social history, past surgical history and problem list.  Past Medical History:   Diagnosis Date   • Coronary artery disease    • Hyperlipidemia    • Hypertension    • Hypothyroidism      Past Surgical History:   Procedure Laterality Date   • JOINT REPLACEMENT     • REPLACEMENT TOTAL KNEE       /76 (BP Location: Left arm, Patient Position: Sitting, Cuff Size: Large Adult)   Pulse 72   Ht 157.5 cm (62\")   Wt 95.7 kg (211 lb)   LMP  (LMP Unknown)   SpO2 95%   BMI 38.59 kg/m²   Family History   Problem Relation Age of Onset   • Hypertension Mother    • Stroke Mother    • Heart disease Father    • No Known Problems Sister    • No Known Problems Brother    • No Known Problems Maternal Aunt    • No Known Problems Maternal Uncle    • No Known Problems Paternal Aunt    • No Known Problems Paternal Uncle    • No Known Problems Maternal Grandmother    • No Known Problems Maternal Grandfather    • No Known Problems Paternal Grandmother    • No Known Problems Paternal Grandfather    • No Known Problems Other    • Anemia Neg Hx    • Arrhythmia Neg Hx    • Asthma Neg Hx    • Clotting disorder Neg Hx    • " Fainting Neg Hx    • Heart attack Neg Hx    • Heart failure Neg Hx    • Hyperlipidemia Neg Hx        Current Outpatient Medications:   •  acetaminophen (TYLENOL) 325 MG tablet, Take 325 mg by mouth Every 6 (Six) Hours As Needed., Disp: , Rfl:   •  Ascorbic Acid (VITAMIN C) 500 MG capsule, Take 500 mg by mouth Daily., Disp: , Rfl:   •  calcium carbonate-vitamin d 600-400 MG-UNIT per tablet, Take 600 mg by mouth 2 (Two) Times a Day., Disp: , Rfl:   •  hydroxychloroquine (PLAQUENIL) 200 MG tablet, Take 200 mg by mouth 2 (Two) Times a Day., Disp: , Rfl: 0  •  levothyroxine (SYNTHROID, LEVOTHROID) 50 MCG tablet, TAKE 1 TABLET BY MOUTH ONCE DAILY, Disp: 90 tablet, Rfl: 1  •  lisinopril-hydrochlorothiazide (PRINZIDE,ZESTORETIC) 10-12.5 MG per tablet, TAKE 1 TABLET BY MOUTH ONCE DAILY, Disp: 90 tablet, Rfl: 1  •  melatonin 5 MG tablet tablet, Take 5 mg by mouth Daily., Disp: , Rfl:   •  Multiple Vitamin (MULTIVITAMIN) capsule, Take 1 capsule by mouth Daily., Disp: , Rfl:   •  omeprazole OTC (PRILOSEC OTC) 20 MG EC tablet, Take 20 mg by mouth Daily., Disp: , Rfl:   •  ondansetron (ZOFRAN) 8 MG tablet, Take 8 mg by mouth 3 (Three) Times a Day As Needed., Disp: , Rfl: 0  •  raNITIdine (ZANTAC) 75 MG tablet, Take 75 mg by mouth Every 12 (Twelve) Hours As Needed., Disp: , Rfl:   •  rosuvastatin (CRESTOR) 10 MG tablet, Take 1 tablet by mouth every night at bedtime., Disp: 90 tablet, Rfl: 1  Allergies   Allergen Reactions   • Atorvastatin Calcium Myalgia   • Cephalexin Dizziness     Social History     Socioeconomic History   • Marital status:      Spouse name: Not on file   • Number of children: Not on file   • Years of education: Not on file   • Highest education level: Not on file   Tobacco Use   • Smoking status: Never Smoker   • Smokeless tobacco: Never Used   Substance and Sexual Activity   • Alcohol use: No     Frequency: Never   • Drug use: No   • Sexual activity: Never     Review of Systems   Constitution: Negative  for fever and malaise/fatigue.   HENT: Negative for congestion and hearing loss.    Eyes: Negative for double vision and visual disturbance.   Cardiovascular: Negative for chest pain, claudication, dyspnea on exertion, leg swelling and syncope.   Respiratory: Negative for cough and shortness of breath.    Endocrine: Negative for cold intolerance.   Skin: Negative for color change and rash.   Musculoskeletal: Negative for arthritis and joint pain.   Gastrointestinal: Negative for abdominal pain and heartburn.   Genitourinary: Negative for hematuria.   Neurological: Negative for excessive daytime sleepiness and dizziness.   Psychiatric/Behavioral: Negative for depression. The patient is not nervous/anxious.    All other systems reviewed and are negative.             Objective:     Physical Exam   Constitutional: She appears well-developed and well-nourished.   HENT:   Head: Normocephalic and atraumatic.   Eyes: Conjunctivae are normal. No scleral icterus.   Neck: Normal range of motion. Neck supple. No JVD present. Carotid bruit is not present.   Cardiovascular: Normal rate, regular rhythm, S1 normal, S2 normal, normal heart sounds and intact distal pulses. PMI is not displaced.   Pulmonary/Chest: Effort normal and breath sounds normal. She has no wheezes. She has no rales.   Abdominal: Soft. Bowel sounds are normal.   Neurological: She is alert. She has normal strength.   Skin: Skin is warm and dry. No rash noted.     Procedures    Lab Review:       Assessment:          Diagnosis Plan   1. Coronary artery disease involving native coronary artery of native heart without angina pectoris     2. Essential hypertension     3. Pure hypercholesterolemia            Plan:       Patient has history of nonobstructive coronary disease with normal LV systolic function is currently stable on medical therapy without any angina  Patient's blood pressure and heart rate stable  Patient lipids are well within normal limits and she is  on a statin  Continue current medicines and follow her in 6 months

## 2020-09-11 RX ORDER — LEVOTHYROXINE SODIUM 0.05 MG/1
TABLET ORAL
Qty: 90 TABLET | Refills: 1 | Status: SHIPPED | OUTPATIENT
Start: 2020-09-11 | End: 2021-03-08 | Stop reason: SDUPTHER

## 2020-09-11 RX ORDER — ROSUVASTATIN CALCIUM 10 MG/1
10 TABLET, COATED ORAL
Qty: 90 TABLET | Refills: 1 | Status: SHIPPED | OUTPATIENT
Start: 2020-09-11 | End: 2021-03-08 | Stop reason: SDUPTHER

## 2020-11-05 RX ORDER — HYDROXYCHLOROQUINE SULFATE 200 MG/1
200 TABLET, FILM COATED ORAL 2 TIMES DAILY
Qty: 180 TABLET | Refills: 1 | Status: SHIPPED | OUTPATIENT
Start: 2020-11-05 | End: 2020-11-09 | Stop reason: SDUPTHER

## 2020-11-06 ENCOUNTER — TELEPHONE (OUTPATIENT)
Dept: FAMILY MEDICINE CLINIC | Facility: CLINIC | Age: 80
End: 2020-11-06

## 2020-11-06 NOTE — TELEPHONE ENCOUNTER
"Patient called stating that we need to contact her insurance company regarding the prescription for hydroxychloroquine 200 mg. Her pharmacy contacted her stating we need to \"override\" with the insurance company. Please advise.   "

## 2020-11-10 RX ORDER — HYDROXYCHLOROQUINE SULFATE 200 MG/1
200 TABLET, FILM COATED ORAL 2 TIMES DAILY
Qty: 180 TABLET | Refills: 1 | Status: SHIPPED | OUTPATIENT
Start: 2020-11-10 | End: 2021-09-07

## 2020-12-08 RX ORDER — LISINOPRIL AND HYDROCHLOROTHIAZIDE 12.5; 1 MG/1; MG/1
TABLET ORAL
Qty: 90 TABLET | Refills: 1 | Status: SHIPPED | OUTPATIENT
Start: 2020-12-08 | End: 2021-06-07

## 2020-12-17 ENCOUNTER — RESULTS ENCOUNTER (OUTPATIENT)
Dept: FAMILY MEDICINE CLINIC | Facility: CLINIC | Age: 80
End: 2020-12-17

## 2020-12-17 ENCOUNTER — OFFICE VISIT (OUTPATIENT)
Dept: FAMILY MEDICINE CLINIC | Facility: CLINIC | Age: 80
End: 2020-12-17

## 2020-12-17 VITALS
HEART RATE: 61 BPM | WEIGHT: 207 LBS | HEIGHT: 62 IN | SYSTOLIC BLOOD PRESSURE: 140 MMHG | OXYGEN SATURATION: 95 % | DIASTOLIC BLOOD PRESSURE: 80 MMHG | BODY MASS INDEX: 38.09 KG/M2 | TEMPERATURE: 97.1 F

## 2020-12-17 DIAGNOSIS — Z12.11 COLON CANCER SCREENING: ICD-10-CM

## 2020-12-17 DIAGNOSIS — Z23 NEED FOR 23-POLYVALENT PNEUMOCOCCAL POLYSACCHARIDE VACCINE: ICD-10-CM

## 2020-12-17 DIAGNOSIS — E78.2 MIXED HYPERLIPIDEMIA: Primary | ICD-10-CM

## 2020-12-17 DIAGNOSIS — Z23 NEED FOR INFLUENZA VACCINATION: ICD-10-CM

## 2020-12-17 DIAGNOSIS — Z00.00 PREVENTATIVE HEALTH CARE: ICD-10-CM

## 2020-12-17 DIAGNOSIS — E03.9 ACQUIRED HYPOTHYROIDISM: ICD-10-CM

## 2020-12-17 PROCEDURE — 90694 VACC AIIV4 NO PRSRV 0.5ML IM: CPT | Performed by: NURSE PRACTITIONER

## 2020-12-17 PROCEDURE — 99214 OFFICE O/P EST MOD 30 MIN: CPT | Performed by: NURSE PRACTITIONER

## 2020-12-17 PROCEDURE — 90732 PPSV23 VACC 2 YRS+ SUBQ/IM: CPT | Performed by: NURSE PRACTITIONER

## 2020-12-17 PROCEDURE — G0009 ADMIN PNEUMOCOCCAL VACCINE: HCPCS | Performed by: NURSE PRACTITIONER

## 2020-12-17 PROCEDURE — G0008 ADMIN INFLUENZA VIRUS VAC: HCPCS | Performed by: NURSE PRACTITIONER

## 2020-12-17 NOTE — PROGRESS NOTES
"    Dannielle Wang is a 80 y.o. female.     80-year-old white female with history of CAD, hypertension, hypothyroidism, TIA, arthritis, and bunions who comes in today for follow-up visit and fasting blood work  Blood pressure 140/80 heart rate 62 she denies any chest pain, dyspnea, tachycardia or dizziness  Patient's last creatinine was mildly elevated but seems to be the norm for her however it has stayed consistent  Weight is down 6 pounds with a weight of 207 a BMI of 37.9 the patient has been actively dieting  Patient did not get a mammogram due to Covid we will schedule that and a bone scan in the spring.  She also needs to schedule an eye exam.  She is refusing colonoscopy but we are currently home Cologuard  Patient getting flu shot and Pneumovax 23 today     fasting blood work   flu shot and Pneumovax 23 today  Home Cologuard  Follow-up 6 months             The following portions of the patient's history were reviewed and updated as appropriate: allergies, current medications, past family history, past medical history, past social history, past surgical history and problem list.    Vitals:    12/17/20 1121   BP: 140/80   BP Location: Right arm   Patient Position: Sitting   Cuff Size: Large Adult   Pulse: 61   Temp: 97.1 °F (36.2 °C)   TempSrc: Temporal   SpO2: 95%   Weight: 93.9 kg (207 lb)   Height: 157.5 cm (62\")     Body mass index is 37.86 kg/m².    Past Medical History:   Diagnosis Date   • Coronary artery disease    • Hyperlipidemia    • Hypertension    • Hypothyroidism      Past Surgical History:   Procedure Laterality Date   • JOINT REPLACEMENT     • REPLACEMENT TOTAL KNEE       Family History   Problem Relation Age of Onset   • Hypertension Mother    • Stroke Mother    • Heart disease Father    • No Known Problems Sister    • No Known Problems Brother    • No Known Problems Maternal Aunt    • No Known Problems Maternal Uncle    • No Known Problems Paternal Aunt    • No Known Problems Paternal " Uncle    • No Known Problems Maternal Grandmother    • No Known Problems Maternal Grandfather    • No Known Problems Paternal Grandmother    • No Known Problems Paternal Grandfather    • No Known Problems Other    • Anemia Neg Hx    • Arrhythmia Neg Hx    • Asthma Neg Hx    • Clotting disorder Neg Hx    • Fainting Neg Hx    • Heart attack Neg Hx    • Heart failure Neg Hx    • Hyperlipidemia Neg Hx      Immunization History   Administered Date(s) Administered   • Fluad Quad 65+ 12/19/2019, 12/17/2020   • Fluzone High Dose =>65 Years ([employer] ONLY) 09/26/2016, 10/16/2018   • PPD Test 06/02/2004       Office Visit on 06/25/2020   Component Date Value Ref Range Status   • WBC 06/25/2020 6.0  3.4 - 10.8 x10E3/uL Final   • RBC 06/25/2020 4.79  3.77 - 5.28 x10E6/uL Final   • Hemoglobin 06/25/2020 14.7  11.1 - 15.9 g/dL Final   • Hematocrit 06/25/2020 44.6  34.0 - 46.6 % Final   • MCV 06/25/2020 93  79 - 97 fL Final   • MCH 06/25/2020 30.7  26.6 - 33.0 pg Final   • MCHC 06/25/2020 33.0  31.5 - 35.7 g/dL Final   • RDW 06/25/2020 13.5  11.7 - 15.4 % Final   • Platelets 06/25/2020 213  150 - 450 x10E3/uL Final   • Neutrophil Rel % 06/25/2020 52  Not Estab. % Final   • Lymphocyte Rel % 06/25/2020 38  Not Estab. % Final   • Monocyte Rel % 06/25/2020 6  Not Estab. % Final   • Eosinophil Rel % 06/25/2020 3  Not Estab. % Final   • Basophil Rel % 06/25/2020 1  Not Estab. % Final   • Neutrophils Absolute 06/25/2020 3.0  1.4 - 7.0 x10E3/uL Final   • Lymphocytes Absolute 06/25/2020 2.3  0.7 - 3.1 x10E3/uL Final   • Monocytes Absolute 06/25/2020 0.4  0.1 - 0.9 x10E3/uL Final   • Eosinophils Absolute 06/25/2020 0.2  0.0 - 0.4 x10E3/uL Final   • Basophils Absolute 06/25/2020 0.1  0.0 - 0.2 x10E3/uL Final   • Immature Granulocyte Rel % 06/25/2020 0  Not Estab. % Final   • Immature Grans Absolute 06/25/2020 0.0  0.0 - 0.1 x10E3/uL Final   • Glucose 06/25/2020 84  65 - 99 mg/dL Final   • BUN 06/25/2020 10  8 - 27 mg/dL Final   • Creatinine  06/25/2020 1.05* 0.57 - 1.00 mg/dL Final   • eGFR Non African Am 06/25/2020 50* >59 mL/min/1.73 Final   • eGFR African Am 06/25/2020 58* >59 mL/min/1.73 Final   • BUN/Creatinine Ratio 06/25/2020 10* 12 - 28 Final   • Sodium 06/25/2020 143  134 - 144 mmol/L Final   • Potassium 06/25/2020 4.0  3.5 - 5.2 mmol/L Final   • Chloride 06/25/2020 102  96 - 106 mmol/L Final   • Total CO2 06/25/2020 25  20 - 29 mmol/L Final   • Calcium 06/25/2020 9.7  8.7 - 10.3 mg/dL Final   • Total Protein 06/25/2020 7.0  6.0 - 8.5 g/dL Final   • Albumin 06/25/2020 4.4  3.6 - 4.6 g/dL Final   • Globulin 06/25/2020 2.6  1.5 - 4.5 g/dL Final   • A/G Ratio 06/25/2020 1.7  1.2 - 2.2 Final   • Total Bilirubin 06/25/2020 0.4  0.0 - 1.2 mg/dL Final   • Alkaline Phosphatase 06/25/2020 50  39 - 117 IU/L Final   • AST (SGOT) 06/25/2020 24  0 - 40 IU/L Final   • ALT (SGPT) 06/25/2020 22  0 - 32 IU/L Final   • Total Cholesterol 06/25/2020 166  100 - 199 mg/dL Final   • Triglycerides 06/25/2020 122  0 - 149 mg/dL Final   • HDL Cholesterol 06/25/2020 65  >39 mg/dL Final   • VLDL Cholesterol 06/25/2020 24  5 - 40 mg/dL Final   • LDL Cholesterol  06/25/2020 77  0 - 99 mg/dL Final   • LDL/HDL Ratio 06/25/2020 1.2  0.0 - 3.2 ratio Final    Comment:                                     LDL/HDL Ratio                                              Men  Women                                1/2 Avg.Risk  1.0    1.5                                    Avg.Risk  3.6    3.2                                 2X Avg.Risk  6.2    5.0                                 3X Avg.Risk  8.0    6.1     • TSH 06/25/2020 1.210  0.450 - 4.500 uIU/mL Final   • Free T4 06/25/2020 1.44  0.82 - 1.77 ng/dL Final   • T3, Total 06/25/2020 127  71 - 180 ng/dL Final         Review of Systems   Constitutional: Negative.    HENT: Negative.    Respiratory: Negative.    Cardiovascular: Negative.    Gastrointestinal: Negative.    Genitourinary: Negative.    Musculoskeletal: Negative.    Skin: Negative.     Neurological: Negative.    Psychiatric/Behavioral: Negative.        Objective   Physical Exam    Procedures    Assessment/Plan   Problems Addressed this Visit        Cardiovascular and Mediastinum    Hyperlipidemia - Primary    Relevant Orders    Lipid Panel With LDL / HDL Ratio       Endocrine    Hypothyroidism    Relevant Orders    T3    TSH+Free T4       Other    Preventative health care    Relevant Orders    CBC & Differential    Comprehensive Metabolic Panel      Other Visit Diagnoses     Need for influenza vaccination        Relevant Orders    Fluad Quad >65 years (Completed)    Need for 23-polyvalent pneumococcal polysaccharide vaccine        Relevant Orders    Pneumococcal Polysaccharide Vaccine 23-Valent (PPSV23) Greater Than or Equal To 1yo Subcutaneous / IM    BMI 37.0-37.9, adult          Diagnoses       Codes Comments    Mixed hyperlipidemia    -  Primary ICD-10-CM: E78.2  ICD-9-CM: 272.2     Need for influenza vaccination     ICD-10-CM: Z23  ICD-9-CM: V04.81     Acquired hypothyroidism     ICD-10-CM: E03.9  ICD-9-CM: 244.9     Preventative health care     ICD-10-CM: Z00.00  ICD-9-CM: V70.0     Need for 23-polyvalent pneumococcal polysaccharide vaccine     ICD-10-CM: Z23  ICD-9-CM: V03.82     BMI 37.0-37.9, adult     ICD-10-CM: Z68.37  ICD-9-CM: V85.37             Current Outpatient Medications:   •  acetaminophen (TYLENOL) 325 MG tablet, Take 325 mg by mouth Every 6 (Six) Hours As Needed., Disp: , Rfl:   •  Ascorbic Acid (VITAMIN C) 500 MG capsule, Take 500 mg by mouth Daily., Disp: , Rfl:   •  calcium carbonate-vitamin d 600-400 MG-UNIT per tablet, Take 600 mg by mouth 2 (Two) Times a Day., Disp: , Rfl:   •  hydroxychloroquine (PLAQUENIL) 200 MG tablet, Take 1 tablet by mouth 2 (Two) Times a Day for 99 days. Indications: Rheumatoid Arthritis, Disp: 180 tablet, Rfl: 1  •  levothyroxine (SYNTHROID, LEVOTHROID) 50 MCG tablet, TAKE 1 TABLET BY MOUTH EVERY DAY, Disp: 90 tablet, Rfl: 1  •   lisinopril-hydrochlorothiazide (PRINZIDE,ZESTORETIC) 10-12.5 MG per tablet, TAKE 1 TABLET BY MOUTH EVERY DAY, Disp: 90 tablet, Rfl: 1  •  melatonin 5 MG tablet tablet, Take 5 mg by mouth Daily., Disp: , Rfl:   •  Multiple Vitamin (MULTIVITAMIN) capsule, Take 1 capsule by mouth Daily., Disp: , Rfl:   •  omeprazole OTC (PRILOSEC OTC) 20 MG EC tablet, Take 20 mg by mouth Daily., Disp: , Rfl:   •  ondansetron (ZOFRAN) 8 MG tablet, Take 8 mg by mouth 3 (Three) Times a Day As Needed., Disp: , Rfl: 0  •  raNITIdine (ZANTAC) 75 MG tablet, Take 75 mg by mouth Every 12 (Twelve) Hours As Needed., Disp: , Rfl:   •  rosuvastatin (CRESTOR) 10 MG tablet, TAKE 1 TABLET BY MOUTH EVERY NIGHT AT BEDTIME, Disp: 90 tablet, Rfl: 1

## 2020-12-18 LAB
ALBUMIN SERPL-MCNC: 4.3 G/DL (ref 3.6–4.6)
ALBUMIN/GLOB SERPL: 1.5 {RATIO} (ref 1.2–2.2)
ALP SERPL-CCNC: 56 IU/L (ref 39–117)
ALT SERPL-CCNC: 17 IU/L (ref 0–32)
AST SERPL-CCNC: 21 IU/L (ref 0–40)
BASOPHILS # BLD AUTO: 0.1 X10E3/UL (ref 0–0.2)
BASOPHILS NFR BLD AUTO: 1 %
BILIRUB SERPL-MCNC: 0.5 MG/DL (ref 0–1.2)
BUN SERPL-MCNC: 13 MG/DL (ref 8–27)
BUN/CREAT SERPL: 12 (ref 12–28)
CALCIUM SERPL-MCNC: 9.6 MG/DL (ref 8.7–10.3)
CHLORIDE SERPL-SCNC: 104 MMOL/L (ref 96–106)
CHOLEST SERPL-MCNC: 182 MG/DL (ref 100–199)
CO2 SERPL-SCNC: 26 MMOL/L (ref 20–29)
CREAT SERPL-MCNC: 1.08 MG/DL (ref 0.57–1)
EOSINOPHIL # BLD AUTO: 0.1 X10E3/UL (ref 0–0.4)
EOSINOPHIL NFR BLD AUTO: 2 %
ERYTHROCYTE [DISTWIDTH] IN BLOOD BY AUTOMATED COUNT: 13.6 % (ref 11.7–15.4)
GLOBULIN SER CALC-MCNC: 2.8 G/DL (ref 1.5–4.5)
GLUCOSE SERPL-MCNC: 90 MG/DL (ref 65–99)
HCT VFR BLD AUTO: 44.5 % (ref 34–46.6)
HDLC SERPL-MCNC: 66 MG/DL
HGB BLD-MCNC: 15 G/DL (ref 11.1–15.9)
IMM GRANULOCYTES # BLD AUTO: 0 X10E3/UL (ref 0–0.1)
IMM GRANULOCYTES NFR BLD AUTO: 0 %
LDLC SERPL CALC-MCNC: 95 MG/DL (ref 0–99)
LDLC/HDLC SERPL: 1.4 RATIO (ref 0–3.2)
LYMPHOCYTES # BLD AUTO: 2.1 X10E3/UL (ref 0.7–3.1)
LYMPHOCYTES NFR BLD AUTO: 34 %
MCH RBC QN AUTO: 30.1 PG (ref 26.6–33)
MCHC RBC AUTO-ENTMCNC: 33.7 G/DL (ref 31.5–35.7)
MCV RBC AUTO: 89 FL (ref 79–97)
MONOCYTES # BLD AUTO: 0.4 X10E3/UL (ref 0.1–0.9)
MONOCYTES NFR BLD AUTO: 7 %
NEUTROPHILS # BLD AUTO: 3.4 X10E3/UL (ref 1.4–7)
NEUTROPHILS NFR BLD AUTO: 56 %
PLATELET # BLD AUTO: 232 X10E3/UL (ref 150–450)
POTASSIUM SERPL-SCNC: 4.2 MMOL/L (ref 3.5–5.2)
PROT SERPL-MCNC: 7.1 G/DL (ref 6–8.5)
RBC # BLD AUTO: 4.99 X10E6/UL (ref 3.77–5.28)
SODIUM SERPL-SCNC: 142 MMOL/L (ref 134–144)
T3 SERPL-MCNC: 88 NG/DL (ref 71–180)
T4 FREE SERPL-MCNC: 1.49 NG/DL (ref 0.82–1.77)
TRIGL SERPL-MCNC: 119 MG/DL (ref 0–149)
TSH SERPL DL<=0.005 MIU/L-ACNC: 1.34 UIU/ML (ref 0.45–4.5)
VLDLC SERPL CALC-MCNC: 21 MG/DL (ref 5–40)
WBC # BLD AUTO: 6.1 X10E3/UL (ref 3.4–10.8)

## 2021-01-11 DIAGNOSIS — Z12.11 COLON CANCER SCREENING: ICD-10-CM

## 2021-01-11 DIAGNOSIS — R19.5 POSITIVE COLORECTAL CANCER SCREENING USING COLOGUARD TEST: Primary | ICD-10-CM

## 2021-02-23 ENCOUNTER — OFFICE VISIT (OUTPATIENT)
Dept: CARDIOLOGY | Facility: CLINIC | Age: 81
End: 2021-02-23

## 2021-02-23 VITALS
HEART RATE: 84 BPM | HEIGHT: 62 IN | SYSTOLIC BLOOD PRESSURE: 137 MMHG | OXYGEN SATURATION: 96 % | BODY MASS INDEX: 38.09 KG/M2 | DIASTOLIC BLOOD PRESSURE: 79 MMHG | WEIGHT: 207 LBS

## 2021-02-23 DIAGNOSIS — E78.00 PURE HYPERCHOLESTEROLEMIA: ICD-10-CM

## 2021-02-23 DIAGNOSIS — I25.10 CORONARY ARTERY DISEASE INVOLVING NATIVE CORONARY ARTERY OF NATIVE HEART WITHOUT ANGINA PECTORIS: Primary | ICD-10-CM

## 2021-02-23 DIAGNOSIS — I10 ESSENTIAL HYPERTENSION: ICD-10-CM

## 2021-02-23 PROCEDURE — 99214 OFFICE O/P EST MOD 30 MIN: CPT | Performed by: INTERNAL MEDICINE

## 2021-02-23 NOTE — PROGRESS NOTES
"    Subjective:     Encounter Date:02/23/2021      Patient ID: Dannielle Wang is a 81 y.o. female.    Chief Complaint:  History of Present Illness 81-year-old white female with history of coronary disease hypertension hyperlipidemia presents to my office for follow-up.  Patient is currently stable without any symptoms of chest pain or shortness of breath at rest or exertion.  No complaints any PND orthopnea.  No palpitation dizziness syncope or swelling of the feet.  She is taking her medicines regularly.  She does not smoke.  She is trying to be active.  She follows a good diet.    The following portions of the patient's history were reviewed and updated as appropriate: allergies, current medications, past family history, past medical history, past social history, past surgical history and problem list.  Past Medical History:   Diagnosis Date   • Coronary artery disease    • Hyperlipidemia    • Hypertension    • Hypothyroidism      Past Surgical History:   Procedure Laterality Date   • JOINT REPLACEMENT     • REPLACEMENT TOTAL KNEE       /79 (BP Location: Left arm, Patient Position: Sitting)   Pulse 84   Ht 157.5 cm (62\")   Wt 93.9 kg (207 lb)   LMP  (LMP Unknown)   SpO2 96%   BMI 37.86 kg/m²   Family History   Problem Relation Age of Onset   • Hypertension Mother    • Stroke Mother    • Heart disease Father    • No Known Problems Sister    • No Known Problems Brother    • No Known Problems Maternal Aunt    • No Known Problems Maternal Uncle    • No Known Problems Paternal Aunt    • No Known Problems Paternal Uncle    • No Known Problems Maternal Grandmother    • No Known Problems Maternal Grandfather    • No Known Problems Paternal Grandmother    • No Known Problems Paternal Grandfather    • No Known Problems Other    • Anemia Neg Hx    • Arrhythmia Neg Hx    • Asthma Neg Hx    • Clotting disorder Neg Hx    • Fainting Neg Hx    • Heart attack Neg Hx    • Heart failure Neg Hx    • Hyperlipidemia Neg " Hx        Current Outpatient Medications:   •  acetaminophen (TYLENOL) 325 MG tablet, Take 325 mg by mouth Every 6 (Six) Hours As Needed., Disp: , Rfl:   •  Ascorbic Acid (VITAMIN C) 500 MG capsule, Take 500 mg by mouth Daily., Disp: , Rfl:   •  calcium carbonate-vitamin d 600-400 MG-UNIT per tablet, Take 600 mg by mouth 2 (Two) Times a Day., Disp: , Rfl:   •  levothyroxine (SYNTHROID, LEVOTHROID) 50 MCG tablet, TAKE 1 TABLET BY MOUTH EVERY DAY, Disp: 90 tablet, Rfl: 1  •  lisinopril-hydrochlorothiazide (PRINZIDE,ZESTORETIC) 10-12.5 MG per tablet, TAKE 1 TABLET BY MOUTH EVERY DAY, Disp: 90 tablet, Rfl: 1  •  melatonin 5 MG tablet tablet, Take 5 mg by mouth Daily., Disp: , Rfl:   •  Multiple Vitamin (MULTIVITAMIN) capsule, Take 1 capsule by mouth Daily., Disp: , Rfl:   •  omeprazole OTC (PRILOSEC OTC) 20 MG EC tablet, Take 20 mg by mouth Daily., Disp: , Rfl:   •  ondansetron (ZOFRAN) 8 MG tablet, Take 8 mg by mouth 3 (Three) Times a Day As Needed., Disp: , Rfl: 0  •  raNITIdine (ZANTAC) 75 MG tablet, Take 75 mg by mouth Every 12 (Twelve) Hours As Needed., Disp: , Rfl:   •  rosuvastatin (CRESTOR) 10 MG tablet, TAKE 1 TABLET BY MOUTH EVERY NIGHT AT BEDTIME, Disp: 90 tablet, Rfl: 1  •  hydroxychloroquine (PLAQUENIL) 200 MG tablet, Take 1 tablet by mouth 2 (Two) Times a Day for 99 days. Indications: Rheumatoid Arthritis, Disp: 180 tablet, Rfl: 1  Allergies   Allergen Reactions   • Atorvastatin Calcium Myalgia   • Cephalexin Dizziness     Social History     Socioeconomic History   • Marital status:      Spouse name: Not on file   • Number of children: Not on file   • Years of education: Not on file   • Highest education level: Not on file   Tobacco Use   • Smoking status: Never Smoker   • Smokeless tobacco: Never Used   Substance and Sexual Activity   • Alcohol use: No     Frequency: Never   • Drug use: No   • Sexual activity: Never     Review of Systems   Constitution: Negative for fever and malaise/fatigue.    Cardiovascular: Negative for chest pain, dyspnea on exertion and palpitations.   Respiratory: Negative for cough and shortness of breath.    Skin: Negative for rash.   Gastrointestinal: Negative for abdominal pain, nausea and vomiting.   Neurological: Negative for focal weakness and headaches.   All other systems reviewed and are negative.             Objective:     Constitutional:       Appearance: Well-developed.   Eyes:      General: No scleral icterus.     Conjunctiva/sclera: Conjunctivae normal.   HENT:      Head: Normocephalic and atraumatic.   Neck:      Musculoskeletal: Normal range of motion and neck supple.      Vascular: No carotid bruit or JVD.   Pulmonary:      Effort: Pulmonary effort is normal.      Breath sounds: Normal breath sounds. No wheezing. No rales.   Cardiovascular:      Normal rate. Regular rhythm.      Murmurs: There is a systolic murmur.   Pulses:     Intact distal pulses.   Abdominal:      General: Bowel sounds are normal.      Palpations: Abdomen is soft.   Skin:     General: Skin is warm and dry.      Findings: No rash.   Neurological:      Mental Status: Alert.       Procedures    Lab Review:         MDM  1.  Coronary disease  Patient has nonobstructive coronary disease with normal be systolic function is currently stable on medications #2 hypertension  Patient blood pressure currently stable on lisinopril hydrochlorothiazide  3.  Hyperlipidemia  Patient's lipids are followed by primary doctor and she is on Crestor  Continue current medicines and follow-up in 6 months

## 2021-03-04 ENCOUNTER — ON CAMPUS - OUTPATIENT (AMBULATORY)
Dept: URBAN - METROPOLITAN AREA HOSPITAL 2 | Facility: HOSPITAL | Age: 81
End: 2021-03-04

## 2021-03-04 ENCOUNTER — OFFICE (AMBULATORY)
Dept: URBAN - METROPOLITAN AREA PATHOLOGY 4 | Facility: PATHOLOGY | Age: 81
End: 2021-03-04
Payer: COMMERCIAL

## 2021-03-04 ENCOUNTER — OFFICE (AMBULATORY)
Dept: URBAN - METROPOLITAN AREA PATHOLOGY 4 | Facility: PATHOLOGY | Age: 81
End: 2021-03-04

## 2021-03-04 VITALS
HEART RATE: 74 BPM | SYSTOLIC BLOOD PRESSURE: 112 MMHG | HEART RATE: 63 BPM | SYSTOLIC BLOOD PRESSURE: 144 MMHG | TEMPERATURE: 97.5 F | HEIGHT: 63 IN | DIASTOLIC BLOOD PRESSURE: 80 MMHG | SYSTOLIC BLOOD PRESSURE: 128 MMHG | HEART RATE: 76 BPM | HEART RATE: 68 BPM | DIASTOLIC BLOOD PRESSURE: 69 MMHG | HEART RATE: 75 BPM | RESPIRATION RATE: 16 BRPM | OXYGEN SATURATION: 100 % | HEART RATE: 65 BPM | DIASTOLIC BLOOD PRESSURE: 106 MMHG | DIASTOLIC BLOOD PRESSURE: 70 MMHG | SYSTOLIC BLOOD PRESSURE: 110 MMHG | WEIGHT: 207 LBS | DIASTOLIC BLOOD PRESSURE: 53 MMHG | SYSTOLIC BLOOD PRESSURE: 82 MMHG | SYSTOLIC BLOOD PRESSURE: 84 MMHG | HEART RATE: 69 BPM | OXYGEN SATURATION: 99 % | DIASTOLIC BLOOD PRESSURE: 58 MMHG | OXYGEN SATURATION: 97 % | DIASTOLIC BLOOD PRESSURE: 65 MMHG | OXYGEN SATURATION: 98 % | HEART RATE: 53 BPM | SYSTOLIC BLOOD PRESSURE: 139 MMHG | SYSTOLIC BLOOD PRESSURE: 86 MMHG | RESPIRATION RATE: 18 BRPM | RESPIRATION RATE: 19 BRPM | SYSTOLIC BLOOD PRESSURE: 115 MMHG | DIASTOLIC BLOOD PRESSURE: 51 MMHG

## 2021-03-04 DIAGNOSIS — D12.3 BENIGN NEOPLASM OF TRANSVERSE COLON: ICD-10-CM

## 2021-03-04 DIAGNOSIS — D12.0 BENIGN NEOPLASM OF CECUM: ICD-10-CM

## 2021-03-04 DIAGNOSIS — D12.2 BENIGN NEOPLASM OF ASCENDING COLON: ICD-10-CM

## 2021-03-04 DIAGNOSIS — K55.20 ANGIODYSPLASIA OF COLON WITHOUT HEMORRHAGE: ICD-10-CM

## 2021-03-04 DIAGNOSIS — Z86.010 PERSONAL HISTORY OF COLONIC POLYPS: ICD-10-CM

## 2021-03-04 DIAGNOSIS — R19.5 OTHER FECAL ABNORMALITIES: ICD-10-CM

## 2021-03-04 DIAGNOSIS — D12.5 BENIGN NEOPLASM OF SIGMOID COLON: ICD-10-CM

## 2021-03-04 DIAGNOSIS — K57.30 DIVERTICULOSIS OF LARGE INTESTINE WITHOUT PERFORATION OR ABS: ICD-10-CM

## 2021-03-04 PROBLEM — K63.5 POLYP OF COLON: Status: ACTIVE | Noted: 2021-03-04

## 2021-03-04 PROBLEM — K64.0 FIRST DEGREE HEMORRHOIDS: Status: ACTIVE | Noted: 2021-03-04

## 2021-03-04 PROBLEM — K92.2 GASTROINTESTINAL HEMORRHAGE, UNSPECIFIED: Status: ACTIVE | Noted: 2021-03-04

## 2021-03-04 LAB
GI HISTOLOGY: A. UNSPECIFIED: (no result)
GI HISTOLOGY: B. UNSPECIFIED: (no result)
GI HISTOLOGY: C. UNSPECIFIED: (no result)
GI HISTOLOGY: D. UNSPECIFIED: (no result)
GI HISTOLOGY: E. SELECT: (no result)
GI HISTOLOGY: PDF REPORT: (no result)

## 2021-03-04 PROCEDURE — 88305 TISSUE EXAM BY PATHOLOGIST: CPT | Mod: 26 | Performed by: INTERNAL MEDICINE

## 2021-03-04 PROCEDURE — 45381 COLONOSCOPY SUBMUCOUS NJX: CPT | Performed by: INTERNAL MEDICINE

## 2021-03-04 PROCEDURE — 45385 COLONOSCOPY W/LESION REMOVAL: CPT | Performed by: INTERNAL MEDICINE

## 2021-03-08 RX ORDER — ROSUVASTATIN CALCIUM 10 MG/1
10 TABLET, COATED ORAL
Qty: 90 TABLET | Refills: 1 | Status: SHIPPED | OUTPATIENT
Start: 2021-03-08 | End: 2021-09-07

## 2021-03-08 RX ORDER — LEVOTHYROXINE SODIUM 0.05 MG/1
50 TABLET ORAL DAILY
Qty: 90 TABLET | Refills: 1 | Status: SHIPPED | OUTPATIENT
Start: 2021-03-08 | End: 2021-09-07

## 2021-03-17 ENCOUNTER — OFFICE VISIT (OUTPATIENT)
Dept: FAMILY MEDICINE CLINIC | Facility: CLINIC | Age: 81
End: 2021-03-17

## 2021-03-17 VITALS
TEMPERATURE: 96.4 F | DIASTOLIC BLOOD PRESSURE: 78 MMHG | BODY MASS INDEX: 38.09 KG/M2 | OXYGEN SATURATION: 95 % | SYSTOLIC BLOOD PRESSURE: 134 MMHG | WEIGHT: 207 LBS | HEIGHT: 62 IN | HEART RATE: 68 BPM

## 2021-03-17 DIAGNOSIS — E03.9 ACQUIRED HYPOTHYROIDISM: ICD-10-CM

## 2021-03-17 DIAGNOSIS — R79.89 ELEVATED SERUM CREATININE: ICD-10-CM

## 2021-03-17 DIAGNOSIS — K63.5 HYPERPLASTIC COLONIC POLYP, UNSPECIFIED PART OF COLON: ICD-10-CM

## 2021-03-17 DIAGNOSIS — Z12.31 OTHER SCREENING MAMMOGRAM: ICD-10-CM

## 2021-03-17 DIAGNOSIS — H91.93 BILATERAL HEARING LOSS, UNSPECIFIED HEARING LOSS TYPE: ICD-10-CM

## 2021-03-17 DIAGNOSIS — Z78.0 POSTMENOPAUSAL: Primary | ICD-10-CM

## 2021-03-17 PROCEDURE — 99214 OFFICE O/P EST MOD 30 MIN: CPT | Performed by: NURSE PRACTITIONER

## 2021-03-17 NOTE — PROGRESS NOTES
"    Dannielle Wang is a 81 y.o. female.     81-year-old white female with history of CAD, hypertension, hypothyroidism, TIA, arthritis, bunions, and hysterectomy who comes in today for follow-up visit  Blood pressure 134/78 heart rate 68 she denies any chest pain, dyspnea, tachycardia or dizziness  Patient had recent positive Cologuard and after colonoscopy she had 18 polyps one that was extremely large and turned out to be precancerous.  She has another follow-up scheduled in 1 year  Patient's renal function is always mildly elevated will be rechecking that today as well as her thyroid levels  Patient's main complaint is hearing loss in both ears both ears were clear of wax but do have some bulging TMs due to allergies.  She is going to schedule a professional hearing exam  Patient has not had a mammogram for a very long time and with her recent precancerous polyp we are going to do 1 more mammogram to make sure there are no problems along with a bone scan  Weight is 207 with a BMI of 37.9 and we once again discussed diet and weight loss.  She is up-to-date on eye exam    Blood work today  Schedule hearing exam  Keep appointment for colonoscopy next year mammogram DEXA scan at [facility]  Follow-up 3 6 months         The following portions of the patient's history were reviewed and updated as appropriate: allergies, current medications, past family history, past medical history, past social history, past surgical history and problem list.    Vitals:    03/17/21 1105   BP: 134/78   BP Location: Right arm   Patient Position: Sitting   Cuff Size: Large Adult   Pulse: 68   Temp: 96.4 °F (35.8 °C)   TempSrc: Temporal   SpO2: 95%   Weight: 93.9 kg (207 lb)   Height: 157.5 cm (62\")     Body mass index is 37.86 kg/m².    Past Medical History:   Diagnosis Date   • Coronary artery disease    • Hyperlipidemia    • Hypertension    • Hypothyroidism      Past Surgical History:   Procedure Laterality Date   • JOINT REPLACEMENT   "   • REPLACEMENT TOTAL KNEE       Family History   Problem Relation Age of Onset   • Hypertension Mother    • Stroke Mother    • Heart disease Father    • No Known Problems Sister    • No Known Problems Brother    • No Known Problems Maternal Aunt    • No Known Problems Maternal Uncle    • No Known Problems Paternal Aunt    • No Known Problems Paternal Uncle    • No Known Problems Maternal Grandmother    • No Known Problems Maternal Grandfather    • No Known Problems Paternal Grandmother    • No Known Problems Paternal Grandfather    • No Known Problems Other    • Anemia Neg Hx    • Arrhythmia Neg Hx    • Asthma Neg Hx    • Clotting disorder Neg Hx    • Fainting Neg Hx    • Heart attack Neg Hx    • Heart failure Neg Hx    • Hyperlipidemia Neg Hx      Immunization History   Administered Date(s) Administered   • COVID-19 (MODERNA) 01/15/2021, 02/12/2021   • Fluad Quad 65+ 12/19/2019, 12/17/2020   • Fluzone High Dose =>65 Years ([employer] ONLY) 09/26/2016, 10/16/2018   • PPD Test 06/02/2004   • Pneumococcal Polysaccharide (PPSV23) 12/17/2020       Office Visit on 12/17/2020   Component Date Value Ref Range Status   • WBC 12/17/2020 6.1  3.4 - 10.8 x10E3/uL Final   • RBC 12/17/2020 4.99  3.77 - 5.28 x10E6/uL Final   • Hemoglobin 12/17/2020 15.0  11.1 - 15.9 g/dL Final   • Hematocrit 12/17/2020 44.5  34.0 - 46.6 % Final   • MCV 12/17/2020 89  79 - 97 fL Final   • MCH 12/17/2020 30.1  26.6 - 33.0 pg Final   • MCHC 12/17/2020 33.7  31.5 - 35.7 g/dL Final   • RDW 12/17/2020 13.6  11.7 - 15.4 % Final   • Platelets 12/17/2020 232  150 - 450 x10E3/uL Final   • Neutrophil Rel % 12/17/2020 56  Not Estab. % Final   • Lymphocyte Rel % 12/17/2020 34  Not Estab. % Final   • Monocyte Rel % 12/17/2020 7  Not Estab. % Final   • Eosinophil Rel % 12/17/2020 2  Not Estab. % Final   • Basophil Rel % 12/17/2020 1  Not Estab. % Final   • Neutrophils Absolute 12/17/2020 3.4  1.4 - 7.0 x10E3/uL Final   • Lymphocytes Absolute 12/17/2020 2.1  0.7 -  3.1 x10E3/uL Final   • Monocytes Absolute 12/17/2020 0.4  0.1 - 0.9 x10E3/uL Final   • Eosinophils Absolute 12/17/2020 0.1  0.0 - 0.4 x10E3/uL Final   • Basophils Absolute 12/17/2020 0.1  0.0 - 0.2 x10E3/uL Final   • Immature Granulocyte Rel % 12/17/2020 0  Not Estab. % Final   • Immature Grans Absolute 12/17/2020 0.0  0.0 - 0.1 x10E3/uL Final   • Glucose 12/17/2020 90  65 - 99 mg/dL Final   • BUN 12/17/2020 13  8 - 27 mg/dL Final   • Creatinine 12/17/2020 1.08* 0.57 - 1.00 mg/dL Final   • eGFR Non  Am 12/17/2020 48* >59 mL/min/1.73 Final   • eGFR African Am 12/17/2020 56* >59 mL/min/1.73 Final   • BUN/Creatinine Ratio 12/17/2020 12  12 - 28 Final   • Sodium 12/17/2020 142  134 - 144 mmol/L Final   • Potassium 12/17/2020 4.2  3.5 - 5.2 mmol/L Final   • Chloride 12/17/2020 104  96 - 106 mmol/L Final   • Total CO2 12/17/2020 26  20 - 29 mmol/L Final   • Calcium 12/17/2020 9.6  8.7 - 10.3 mg/dL Final   • Total Protein 12/17/2020 7.1  6.0 - 8.5 g/dL Final   • Albumin 12/17/2020 4.3  3.6 - 4.6 g/dL Final   • Globulin 12/17/2020 2.8  1.5 - 4.5 g/dL Final   • A/G Ratio 12/17/2020 1.5  1.2 - 2.2 Final   • Total Bilirubin 12/17/2020 0.5  0.0 - 1.2 mg/dL Final   • Alkaline Phosphatase 12/17/2020 56  39 - 117 IU/L Final   • AST (SGOT) 12/17/2020 21  0 - 40 IU/L Final   • ALT (SGPT) 12/17/2020 17  0 - 32 IU/L Final   • Total Cholesterol 12/17/2020 182  100 - 199 mg/dL Final   • Triglycerides 12/17/2020 119  0 - 149 mg/dL Final   • HDL Cholesterol 12/17/2020 66  >39 mg/dL Final   • VLDL Cholesterol Norberto 12/17/2020 21  5 - 40 mg/dL Final   • LDL Chol Calc ([facility]) 12/17/2020 95  0 - 99 mg/dL Final   • LDL/HDL RATIO 12/17/2020 1.4  0.0 - 3.2 ratio Final    Comment:                                     LDL/HDL Ratio                                              Men  Women                                1/2 Avg.Risk  1.0    1.5                                    Avg.Risk  3.6    3.2                                 2X Avg.Risk  6.2     5.0                                 3X Avg.Risk  8.0    6.1     • T3, Total 12/17/2020 88  71 - 180 ng/dL Final   • TSH 12/17/2020 1.340  0.450 - 4.500 uIU/mL Final   • Free T4 12/17/2020 1.49  0.82 - 1.77 ng/dL Final         Review of Systems   Constitutional: Negative.    HENT: Positive for hearing loss.    Respiratory: Negative.    Cardiovascular: Negative.    Gastrointestinal: Negative.    Genitourinary: Negative.    Musculoskeletal: Negative.    Skin: Negative.    Neurological: Negative.    Psychiatric/Behavioral: Negative.        Objective   Physical Exam  Constitutional:       Appearance: Normal appearance.   HENT:      Head: Normocephalic.      Ears:      Comments: Bulging TMs with clear fluid patient very hard of hearing  Cardiovascular:      Rate and Rhythm: Normal rate and regular rhythm.      Pulses: Normal pulses.      Heart sounds: Normal heart sounds.   Pulmonary:      Effort: Pulmonary effort is normal.      Breath sounds: Normal breath sounds.   Abdominal:      General: Bowel sounds are normal.   Musculoskeletal:         General: Normal range of motion.      Cervical back: Normal range of motion.   Skin:     General: Skin is warm and dry.   Neurological:      General: No focal deficit present.      Mental Status: She is alert and oriented to person, place, and time.   Psychiatric:         Mood and Affect: Mood normal.         Procedures    Assessment/Plan   Diagnoses and all orders for this visit:    1. Postmenopausal (Primary)  -     DEXA Bone Density Axial; Future    2. Elevated serum creatinine  -     Basic Metabolic Panel    3. Acquired hypothyroidism  -     TSH+Free T4  -     T3    4. Bilateral hearing loss, unspecified hearing loss type    5. Hyperplastic colonic polyp, unspecified part of colon          Current Outpatient Medications:   •  acetaminophen (TYLENOL) 325 MG tablet, Take 325 mg by mouth Every 6 (Six) Hours As Needed., Disp: , Rfl:   •  Ascorbic Acid (VITAMIN C) 500 MG capsule,  Take 500 mg by mouth Daily., Disp: , Rfl:   •  calcium carbonate-vitamin d 600-400 MG-UNIT per tablet, Take 600 mg by mouth 2 (Two) Times a Day., Disp: , Rfl:   •  levothyroxine (SYNTHROID, LEVOTHROID) 50 MCG tablet, Take 1 tablet by mouth Daily., Disp: 90 tablet, Rfl: 1  •  lisinopril-hydrochlorothiazide (PRINZIDE,ZESTORETIC) 10-12.5 MG per tablet, TAKE 1 TABLET BY MOUTH EVERY DAY, Disp: 90 tablet, Rfl: 1  •  melatonin 5 MG tablet tablet, Take 5 mg by mouth Daily., Disp: , Rfl:   •  Multiple Vitamin (MULTIVITAMIN) capsule, Take 1 capsule by mouth Daily., Disp: , Rfl:   •  omeprazole OTC (PRILOSEC OTC) 20 MG EC tablet, Take 20 mg by mouth Daily., Disp: , Rfl:   •  ondansetron (ZOFRAN) 8 MG tablet, Take 8 mg by mouth 3 (Three) Times a Day As Needed., Disp: , Rfl: 0  •  raNITIdine (ZANTAC) 75 MG tablet, Take 75 mg by mouth Every 12 (Twelve) Hours As Needed., Disp: , Rfl:   •  rosuvastatin (CRESTOR) 10 MG tablet, Take 1 tablet by mouth every night at bedtime., Disp: 90 tablet, Rfl: 1  •  hydroxychloroquine (PLAQUENIL) 200 MG tablet, Take 1 tablet by mouth 2 (Two) Times a Day for 99 days. Indications: Rheumatoid Arthritis, Disp: 180 tablet, Rfl: 1

## 2021-03-17 NOTE — PATIENT INSTRUCTIONS
Blood work today  Schedule hearing exam  Keep appointment for colonoscopy next year  Keep appointment for mammogram and DEXA scan  Follow-up 3 6 months

## 2021-03-18 LAB
BUN SERPL-MCNC: 12 MG/DL (ref 8–27)
BUN/CREAT SERPL: 13 (ref 12–28)
CALCIUM SERPL-MCNC: 9.3 MG/DL (ref 8.7–10.3)
CHLORIDE SERPL-SCNC: 103 MMOL/L (ref 96–106)
CO2 SERPL-SCNC: 26 MMOL/L (ref 20–29)
CREAT SERPL-MCNC: 0.93 MG/DL (ref 0.57–1)
GLUCOSE SERPL-MCNC: 88 MG/DL (ref 65–99)
POTASSIUM SERPL-SCNC: 4.3 MMOL/L (ref 3.5–5.2)
SODIUM SERPL-SCNC: 143 MMOL/L (ref 134–144)
T3 SERPL-MCNC: 112 NG/DL (ref 71–180)
T4 FREE SERPL-MCNC: 1.44 NG/DL (ref 0.82–1.77)
TSH SERPL DL<=0.005 MIU/L-ACNC: 1.04 UIU/ML (ref 0.45–4.5)

## 2021-03-31 ENCOUNTER — HOSPITAL ENCOUNTER (OUTPATIENT)
Dept: MAMMOGRAPHY | Facility: HOSPITAL | Age: 81
Discharge: HOME | End: 2021-03-31

## 2021-03-31 ENCOUNTER — HOSPITAL ENCOUNTER (OUTPATIENT)
Dept: BONE DENSITY | Facility: HOSPITAL | Age: 81
Discharge: HOME | End: 2021-03-31

## 2021-03-31 DIAGNOSIS — Z78.0 POSTMENOPAUSAL: ICD-10-CM

## 2021-03-31 DIAGNOSIS — Z12.31 OTHER SCREENING MAMMOGRAM: ICD-10-CM

## 2021-03-31 PROCEDURE — 77080 DXA BONE DENSITY AXIAL: CPT

## 2021-03-31 PROCEDURE — 77067 SCR MAMMO BI INCL CAD: CPT

## 2021-03-31 PROCEDURE — 77063 BREAST TOMOSYNTHESIS BI: CPT

## 2021-06-07 RX ORDER — LISINOPRIL AND HYDROCHLOROTHIAZIDE 12.5; 1 MG/1; MG/1
TABLET ORAL
Qty: 90 TABLET | Refills: 1 | Status: SHIPPED | OUTPATIENT
Start: 2021-06-07 | End: 2021-12-06

## 2021-06-11 ENCOUNTER — APPOINTMENT (OUTPATIENT)
Dept: CT IMAGING | Facility: HOSPITAL | Age: 81
End: 2021-06-11

## 2021-06-11 ENCOUNTER — HOSPITAL ENCOUNTER (EMERGENCY)
Facility: HOSPITAL | Age: 81
Discharge: HOME | End: 2021-06-11
Attending: EMERGENCY MEDICINE | Admitting: EMERGENCY MEDICINE

## 2021-06-11 VITALS
WEIGHT: 219.7 LBS | BODY MASS INDEX: 41.48 KG/M2 | SYSTOLIC BLOOD PRESSURE: 128 MMHG | OXYGEN SATURATION: 95 % | DIASTOLIC BLOOD PRESSURE: 51 MMHG | RESPIRATION RATE: 18 BRPM | TEMPERATURE: 98.3 F | HEART RATE: 65 BPM | HEIGHT: 61 IN

## 2021-06-11 DIAGNOSIS — R53.1 WEAKNESS: ICD-10-CM

## 2021-06-11 DIAGNOSIS — R55 NEAR SYNCOPE: Primary | ICD-10-CM

## 2021-06-11 LAB
ANION GAP SERPL CALCULATED.3IONS-SCNC: 10 MMOL/L (ref 5–15)
BASOPHILS # BLD AUTO: 0.1 10*3/MM3 (ref 0–0.2)
BASOPHILS NFR BLD AUTO: 1.4 % (ref 0–1.5)
BUN SERPL-MCNC: 17 MG/DL (ref 8–23)
BUN/CREAT SERPL: 15.7 (ref 7–25)
CALCIUM SPEC-SCNC: 9.5 MG/DL (ref 8.6–10.5)
CHLORIDE SERPL-SCNC: 102 MMOL/L (ref 98–107)
CO2 SERPL-SCNC: 26 MMOL/L (ref 22–29)
CREAT SERPL-MCNC: 1.08 MG/DL (ref 0.57–1)
DEPRECATED RDW RBC AUTO: 45.5 FL (ref 37–54)
EOSINOPHIL # BLD AUTO: 0.1 10*3/MM3 (ref 0–0.4)
EOSINOPHIL NFR BLD AUTO: 1.9 % (ref 0.3–6.2)
ERYTHROCYTE [DISTWIDTH] IN BLOOD BY AUTOMATED COUNT: 14.7 % (ref 12.3–15.4)
GFR SERPL CREATININE-BSD FRML MDRD: 49 ML/MIN/1.73
GLUCOSE SERPL-MCNC: 98 MG/DL (ref 65–99)
HCT VFR BLD AUTO: 40.8 % (ref 34–46.6)
HGB BLD-MCNC: 14.1 G/DL (ref 12–15.9)
LYMPHOCYTES # BLD AUTO: 1.5 10*3/MM3 (ref 0.7–3.1)
LYMPHOCYTES NFR BLD AUTO: 28.1 % (ref 19.6–45.3)
MCH RBC QN AUTO: 30.3 PG (ref 26.6–33)
MCHC RBC AUTO-ENTMCNC: 34.6 G/DL (ref 31.5–35.7)
MCV RBC AUTO: 87.7 FL (ref 79–97)
MONOCYTES # BLD AUTO: 0.4 10*3/MM3 (ref 0.1–0.9)
MONOCYTES NFR BLD AUTO: 8.3 % (ref 5–12)
NEUTROPHILS NFR BLD AUTO: 3.1 10*3/MM3 (ref 1.7–7)
NEUTROPHILS NFR BLD AUTO: 60.3 % (ref 42.7–76)
NRBC BLD AUTO-RTO: 0.1 /100 WBC (ref 0–0.2)
PLATELET # BLD AUTO: 184 10*3/MM3 (ref 140–450)
PMV BLD AUTO: 7.7 FL (ref 6–12)
POTASSIUM SERPL-SCNC: 3.8 MMOL/L (ref 3.5–5.2)
RBC # BLD AUTO: 4.64 10*6/MM3 (ref 3.77–5.28)
SODIUM SERPL-SCNC: 138 MMOL/L (ref 136–145)
WBC # BLD AUTO: 5.2 10*3/MM3 (ref 3.4–10.8)

## 2021-06-11 PROCEDURE — 70450 CT HEAD/BRAIN W/O DYE: CPT

## 2021-06-11 PROCEDURE — 99284 EMERGENCY DEPT VISIT MOD MDM: CPT

## 2021-06-11 PROCEDURE — 80048 BASIC METABOLIC PNL TOTAL CA: CPT | Performed by: EMERGENCY MEDICINE

## 2021-06-11 PROCEDURE — 93005 ELECTROCARDIOGRAM TRACING: CPT | Performed by: EMERGENCY MEDICINE

## 2021-06-11 PROCEDURE — 85025 COMPLETE CBC W/AUTO DIFF WBC: CPT | Performed by: EMERGENCY MEDICINE

## 2021-06-11 RX ORDER — SODIUM CHLORIDE 0.9 % (FLUSH) 0.9 %
10 SYRINGE (ML) INJECTION AS NEEDED
Status: DISCONTINUED | OUTPATIENT
Start: 2021-06-11 | End: 2021-06-11 | Stop reason: HOSPADM

## 2021-06-11 NOTE — ED PROVIDER NOTES
Subjective   Patient is an 81-year-old female who states an episode at home lasting several minutes where she felt weak and dizzy had some blurred vision and a near syncopal episode.  She had no other associated symptoms since that time.  She states he feels at her baseline.          Review of Systems  For headache earache sore throat cough fever chest pain shortness of breath abdominal pain vomiting diarrhea dysuria achiness weight loss or other complaint.  A complete review systems was obtained and is otherwise negative  Past Medical History:   Diagnosis Date   • Coronary artery disease    • Hyperlipidemia    • Hypertension    • Hypothyroidism        Allergies   Allergen Reactions   • Atorvastatin Calcium Myalgia   • Cephalexin Dizziness       Past Surgical History:   Procedure Laterality Date   • JOINT REPLACEMENT     • REPLACEMENT TOTAL KNEE         Family History   Problem Relation Age of Onset   • Hypertension Mother    • Stroke Mother    • Heart disease Father    • No Known Problems Sister    • No Known Problems Brother    • No Known Problems Maternal Aunt    • No Known Problems Maternal Uncle    • No Known Problems Paternal Aunt    • No Known Problems Paternal Uncle    • No Known Problems Maternal Grandmother    • No Known Problems Maternal Grandfather    • No Known Problems Paternal Grandmother    • No Known Problems Paternal Grandfather    • No Known Problems Other    • Anemia Neg Hx    • Arrhythmia Neg Hx    • Asthma Neg Hx    • Clotting disorder Neg Hx    • Fainting Neg Hx    • Heart attack Neg Hx    • Heart failure Neg Hx    • Hyperlipidemia Neg Hx        Social History     Socioeconomic History   • Marital status:      Spouse name: Not on file   • Number of children: Not on file   • Years of education: Not on file   • Highest education level: Not on file   Tobacco Use   • Smoking status: Never Smoker   • Smokeless tobacco: Never Used   Substance and Sexual Activity   • Alcohol use: No   • Drug  use: No   • Sexual activity: Never           Objective   Physical Exam  Neurologic exam is nonfocal.  HEENT exam shows TMs to be clear.  Oropharynx is clear moist.  Sclerae nonicteric.  Neck has no adenopathy JVD or bruits.  Lungs are clear.  Heart has regular rate rhythm without murmur rub or gallop.  Chest is nontender.  Abdomen is soft nontender.  Extremity exam is no cyanosis or edema.  Procedures       My EKG interpretation shows normal sinus rhythm with no acute ST change    ED Course      Results for orders placed or performed during the hospital encounter of 06/11/21   Basic Metabolic Panel    Specimen: Blood   Result Value Ref Range    Glucose 98 65 - 99 mg/dL    BUN 17 8 - 23 mg/dL    Creatinine 1.08 (H) 0.57 - 1.00 mg/dL    Sodium 138 136 - 145 mmol/L    Potassium 3.8 3.5 - 5.2 mmol/L    Chloride 102 98 - 107 mmol/L    CO2 26.0 22.0 - 29.0 mmol/L    Calcium 9.5 8.6 - 10.5 mg/dL    eGFR Non African Amer 49 (L) >60 mL/min/1.73    BUN/Creatinine Ratio 15.7 7.0 - 25.0    Anion Gap 10.0 5.0 - 15.0 mmol/L   CBC Auto Differential    Specimen: Blood   Result Value Ref Range    WBC 5.20 3.40 - 10.80 10*3/mm3    RBC 4.64 3.77 - 5.28 10*6/mm3    Hemoglobin 14.1 12.0 - 15.9 g/dL    Hematocrit 40.8 34.0 - 46.6 %    MCV 87.7 79.0 - 97.0 fL    MCH 30.3 26.6 - 33.0 pg    MCHC 34.6 31.5 - 35.7 g/dL    RDW 14.7 12.3 - 15.4 %    RDW-SD 45.5 37.0 - 54.0 fl    MPV 7.7 6.0 - 12.0 fL    Platelets 184 140 - 450 10*3/mm3    Neutrophil % 60.3 42.7 - 76.0 %    Lymphocyte % 28.1 19.6 - 45.3 %    Monocyte % 8.3 5.0 - 12.0 %    Eosinophil % 1.9 0.3 - 6.2 %    Basophil % 1.4 0.0 - 1.5 %    Neutrophils, Absolute 3.10 1.70 - 7.00 10*3/mm3    Lymphocytes, Absolute 1.50 0.70 - 3.10 10*3/mm3    Monocytes, Absolute 0.40 0.10 - 0.90 10*3/mm3    Eosinophils, Absolute 0.10 0.00 - 0.40 10*3/mm3    Basophils, Absolute 0.10 0.00 - 0.20 10*3/mm3    nRBC 0.1 0.0 - 0.2 /100 WBC   ECG 12 Lead   Result Value Ref Range    QT Interval 404 ms     CT Head  Without Contrast    Result Date: 6/11/2021   1. No acute intracranial findings. 2. Mild atrophy.  Electronically Signed By-Griselda Hampton MD On:6/11/2021 3:52 PM This report was finalized on [id number] by  Griselda Hampton MD.                                         MDM  Number of Diagnoses or Management Options  Diagnosis management comments: Patient has a benign physical exam.  There is no evidence of ectopy on her EKG.  Metabolic panel is at baseline.  There is no evidence of infectious process.  Patient remained at baseline throughout the ED visit.  She will be discharged.  She will drink plenty fluids and rest today.  She will see MD for recheck or return as needed.       Amount and/or Complexity of Data Reviewed  Clinical lab tests: reviewed  Tests in the radiology section of CPT®: reviewed  Tests in the medicine section of CPT®: reviewed    Risk of Complications, Morbidity, and/or Mortality  Presenting problems: high  Diagnostic procedures: high  Management options: high    Patient Progress  Patient progress: stable      Final diagnoses:   Near syncope   Weakness       ED Disposition  ED Disposition     ED Disposition Condition Comment    Discharge Stable           No follow-up provider specified.       Medication List      No changes were made to your prescriptions during this visit.          Tomer Waters MD  06/11/21 3319

## 2021-06-12 LAB — QT INTERVAL: 404 MS

## 2021-06-17 ENCOUNTER — PATIENT OUTREACH (OUTPATIENT)
Dept: CASE MANAGEMENT | Facility: OTHER | Age: 81
End: 2021-06-17

## 2021-06-17 NOTE — OUTREACH NOTE
Ambulatory Case Management Note  Patient Outreach  Patient confirms ED f/up appt w/ PCP on 6/24/21 @ 1:15 pm.  She verb understanding re sx requiring immediate medical attention before the appt.  Shonda Yap RN  Ambulatory Case Management  6/17/2021, 12:55 EDT       Ambulatory Case Management Note  Care Coordination  Scheduled ED f/up appt w/ PCP office 6/24/21 @ 1:15pm.  Shonda Yap RN  Ambulatory Case Management  6/17/2021, 12:44 EDT      Ambulatory Case Management Note  Patient Outreach  General & Health Literacy Assessment  Questions/Answers      Most Recent Value   Assessment Completed With  Patient   Living Arrangement  Alone   Type of Residence  Private Residence   Home Care Services  No   Equiptment Used at Home  Cane, Walker   Other Issues  Hearing Impairment   Bed or Wheelchair Confined  No   Difficulty Keeping Appointments  No   Chronic Pain  Yes   Location of Chronic Pain  RA on plaquenil        Care Evaluation  Questions/Answers      Most Recent Value   Annual Wellness Visit:   Patient Will Schedule [to discuss AWV @ next call]   AWV Materials  Send Materials   Care Gaps Addressed  Colon Cancer Screening   Colon Cancer Screening Type  Colonoscopy   Colonoscopy Status  -- [Up to date ]   Colon Cancer Screening Completion at [facility] [place] by [facility] or Other  Other   External Colonoscopy Site  [facility] - Dr Alberto Qiu   External Colonoscopy Approximate Date  3/4/21   Colon Cancer Screening Comments  Annual colonoscopies now d/t muliple colon polyps in 2021 & hyperplasia. Next colonoscopy due 3/2022.   Care Gap Comments  Also diverticulosis on 3/4/21 colonoscopy   Other Patient Education/Resources   24/7 [facility] [place] Nurse Call Line, MyChart, Transportation   24/7 Nurse Call Line Education Method  Verbal   MyChart Education Method  Verbal [Declines d/t technical issues]   Transportation Education Method  Verbal [had negative experience with medicaid assisted transport, now will only transport with son  driving.]   Advanced Directives:  -- [to discuss ACP at next call]   Medication Adherence  Medications understood   Healthy Lifestyle (Self-Efficacy)  self-monitors vital signs appropriately, correctly recognizes signs/symptoms of cardiac distress, correctly recognizes signs/symptoms of pulmonary distress, recognizes when to stop activity, recognizes when to contact medical assistance      SDOH updated and reviewed with the patient during this encounter:     Financial Resource Strain: Low Risk    • Difficulty of Paying Living Expenses: Not very hard       Food Insecurity: No Food Insecurity   • Worried About Running Out of Food in the Last Year: Never true   • Ran Out of Food in the Last Year: Never true       Transportation Needs: No Transportation Needs   • Lack of Transportation (Medical): No   • Lack of Transportation (Non-Medical): No       RN-ACM ED visit follow-up call completed - See assessment/care plan flow sheets for details.    Talked with patient regarding 6/11/21 ED visit for pre-syncopy. Patient was mildly [place]. Patient states to be compliant w/ physician recommendations & states symptoms have improved, but still feels weak and sometimes nauseated. Denies dizziness, blurred vision, SOA, cough, F/C, D/C, vomiting, chest pain/pressure or bladder complaints, but states sometimes her heart races.   States drinks 6-8 glasses water daily, eating WNL.  Checks BP at least Q AM, sometimes also @ HS.  States today BP was 154/84 - states systolic pressure frequently in 150's, but diastolic usually lower than 84.     Patient states she lives alone, but her son lives nearby and can assist as needed.  States she does not drive. States her son drives her & has flexible schedule bc he is unemployed.  [facility] had negative experience w/ Medicaid [employer] transport in 2020, so declines other transportation resources.  She denies insecurities re food, transport, medications.    [facility] has eye appt 6/23/21 at [facility] for routine monitoring. Confirms PCP appt 9/22/21.  She is agreeable to RN assist to schedule ED f/up visit.    Reviewed with patient (see assessment & care plan flow sheets for additional details): RN-ACM role & contact info; PCP/specialty appts,   24-hr nurse triage #, MyChart, blood pressure monitoring.      Patient has routine PCP f/ups & is working towards care plan goals. RN-ACM follow up outreach scheduled one month to discuss scheduling Medicare AWV; ACP status, care gaps, date of last hearing exam, pre-syncopy sx, HTN, heart-healthy & diverticulosis diets.    Shonda Yap, ALBER  Ambulatory Case Management  6/17/2021, 12:44 EDT

## 2021-06-24 ENCOUNTER — OFFICE VISIT (OUTPATIENT)
Dept: FAMILY MEDICINE CLINIC | Facility: CLINIC | Age: 81
End: 2021-06-24

## 2021-06-24 VITALS
SYSTOLIC BLOOD PRESSURE: 129 MMHG | WEIGHT: 210.8 LBS | HEIGHT: 61 IN | OXYGEN SATURATION: 96 % | HEART RATE: 68 BPM | DIASTOLIC BLOOD PRESSURE: 78 MMHG | BODY MASS INDEX: 39.8 KG/M2 | TEMPERATURE: 97.4 F

## 2021-06-24 DIAGNOSIS — R55 NEAR SYNCOPE: Primary | ICD-10-CM

## 2021-06-24 DIAGNOSIS — R53.1 WEAKNESS: ICD-10-CM

## 2021-06-24 PROCEDURE — 99212 OFFICE O/P EST SF 10 MIN: CPT | Performed by: NURSE PRACTITIONER

## 2021-06-24 NOTE — PROGRESS NOTES
"    Dannielle Wang is a 81 y.o. female.     81-year-old white female with history of CAD, hypertension, hypothyroidism, TIA, arthritis, bunions, and hysterectomy who comes in today  for follow-up visit post ER ER visit for near syncopal episode.  Patient states for 4 days at home she felt weak shaky, nauseated and dizzy.  Work-up at the emergency room was negative including a negative CT of the head and normal blood work and EKG.  Patient did report some heart racing after the symptoms started but it was not continuous.  I suspect she may have had an arrhythmia causing the issue or she possibly could have had Covid even though she is vaccinated since they do not check for that at the ER at once you are vaccinated.  I instructed patient to let me know if any of those symptoms reappear  Blood pressure 128/78 heart rate 68 she denies any chest pain, dyspnea, tachycardia or dizziness  Patient up-to-date on Covid vaccines mammogram DEXA scan and colonoscopy    Report any reoccurring symptoms       The following portions of the patient's history were reviewed and updated as appropriate: allergies, current medications, past family history, past medical history, past social history, past surgical history and problem list.    Vitals:    06/24/21 1255   BP: 129/78   BP Location: Left arm   Patient Position: Sitting   Cuff Size: Large Adult   Pulse: 68   Temp: 97.4 °F (36.3 °C)   TempSrc: Temporal   SpO2: 96%   Weight: 95.6 kg (210 lb 12.8 oz)   Height: 154.9 cm (61\")     Body mass index is 39.83 kg/m².    Past Medical History:   Diagnosis Date   • Coronary artery disease    • Hyperlipidemia    • Hypertension    • Hypothyroidism      Past Surgical History:   Procedure Laterality Date   • JOINT REPLACEMENT     • REPLACEMENT TOTAL KNEE       Family History   Problem Relation Age of Onset   • Hypertension Mother    • Stroke Mother    • Heart disease Father    • No Known Problems Sister    • No Known Problems Brother    • No " Known Problems Maternal Aunt    • No Known Problems Maternal Uncle    • No Known Problems Paternal Aunt    • No Known Problems Paternal Uncle    • No Known Problems Maternal Grandmother    • No Known Problems Maternal Grandfather    • No Known Problems Paternal Grandmother    • No Known Problems Paternal Grandfather    • No Known Problems Other    • Anemia Neg Hx    • Arrhythmia Neg Hx    • Asthma Neg Hx    • Clotting disorder Neg Hx    • Fainting Neg Hx    • Heart attack Neg Hx    • Heart failure Neg Hx    • Hyperlipidemia Neg Hx      Immunization History   Administered Date(s) Administered   • COVID-19 (MODERNA) 01/15/2021, 02/12/2021   • Fluad Quad 65+ 12/19/2019, 12/17/2020   • Fluzone High Dose =>65 Years ([employer] ONLY) 09/26/2016, 10/16/2018   • PPD Test 06/02/2004   • Pneumococcal Polysaccharide (PPSV23) 12/17/2020       Admission on 06/11/2021, Discharged on 06/11/2021   Component Date Value Ref Range Status   • QT Interval 06/11/2021 404  ms Final   • Glucose 06/11/2021 98  65 - 99 mg/dL Final   • BUN 06/11/2021 17  8 - 23 mg/dL Final   • Creatinine 06/11/2021 1.08* 0.57 - 1.00 mg/dL Final   • Sodium 06/11/2021 138  136 - 145 mmol/L Final   • Potassium 06/11/2021 3.8  3.5 - 5.2 mmol/L Final   • Chloride 06/11/2021 102  98 - 107 mmol/L Final   • CO2 06/11/2021 26.0  22.0 - 29.0 mmol/L Final   • Calcium 06/11/2021 9.5  8.6 - 10.5 mg/dL Final   • eGFR Non African Amer 06/11/2021 49* >60 mL/min/1.73 Final   • BUN/Creatinine Ratio 06/11/2021 15.7  7.0 - 25.0 Final   • Anion Gap 06/11/2021 10.0  5.0 - 15.0 mmol/L Final   • WBC 06/11/2021 5.20  3.40 - 10.80 10*3/mm3 Final   • RBC 06/11/2021 4.64  3.77 - 5.28 10*6/mm3 Final   • Hemoglobin 06/11/2021 14.1  12.0 - 15.9 g/dL Final   • Hematocrit 06/11/2021 40.8  34.0 - 46.6 % Final   • MCV 06/11/2021 87.7  79.0 - 97.0 fL Final   • MCH 06/11/2021 30.3  26.6 - 33.0 pg Final   • MCHC 06/11/2021 34.6  31.5 - 35.7 g/dL Final   • RDW 06/11/2021 14.7  12.3 - 15.4 % Final   •  RDW-SD 06/11/2021 45.5  37.0 - 54.0 fl Final   • MPV 06/11/2021 7.7  6.0 - 12.0 fL Final   • Platelets 06/11/2021 184  140 - 450 10*3/mm3 Final   • Neutrophil % 06/11/2021 60.3  42.7 - 76.0 % Final   • Lymphocyte % 06/11/2021 28.1  19.6 - 45.3 % Final   • Monocyte % 06/11/2021 8.3  5.0 - 12.0 % Final   • Eosinophil % 06/11/2021 1.9  0.3 - 6.2 % Final   • Basophil % 06/11/2021 1.4  0.0 - 1.5 % Final   • Neutrophils, Absolute 06/11/2021 3.10  1.70 - 7.00 10*3/mm3 Final   • Lymphocytes, Absolute 06/11/2021 1.50  0.70 - 3.10 10*3/mm3 Final   • Monocytes, Absolute 06/11/2021 0.40  0.10 - 0.90 10*3/mm3 Final   • Eosinophils, Absolute 06/11/2021 0.10  0.00 - 0.40 10*3/mm3 Final   • Basophils, Absolute 06/11/2021 0.10  0.00 - 0.20 10*3/mm3 Final   • nRBC 06/11/2021 0.1  0.0 - 0.2 /100 WBC Final         Review of Systems   Constitutional: Negative.    HENT: Negative.    Respiratory: Negative.    Cardiovascular: Negative.    Gastrointestinal: Negative.    Genitourinary: Negative.    Musculoskeletal: Negative.    Skin: Negative.    Neurological: Negative.    Psychiatric/Behavioral: Negative.        Objective   Physical Exam  Constitutional:       Appearance: Normal appearance.   Cardiovascular:      Rate and Rhythm: Normal rate and regular rhythm.      Pulses: Normal pulses.      Heart sounds: Normal heart sounds.   Pulmonary:      Effort: Pulmonary effort is normal.      Breath sounds: Normal breath sounds.   Skin:     General: Skin is warm and dry.   Neurological:      General: No focal deficit present.      Mental Status: She is alert and oriented to person, place, and time.   Psychiatric:         Mood and Affect: Mood normal.         Behavior: Behavior normal.         Procedures    Assessment/Plan   Diagnoses and all orders for this visit:    1. Near syncope (Primary)    2. Weakness          Current Outpatient Medications:   •  acetaminophen (TYLENOL) 325 MG tablet, Take 325 mg by mouth Every 6 (Six) Hours As Needed.,  Disp: , Rfl:   •  Ascorbic Acid (VITAMIN C) 500 MG capsule, Take 500 mg by mouth Daily., Disp: , Rfl:   •  calcium carbonate-vitamin d 600-400 MG-UNIT per tablet, Take 600 mg by mouth 2 (Two) Times a Day., Disp: , Rfl:   •  levothyroxine (SYNTHROID, LEVOTHROID) 50 MCG tablet, Take 1 tablet by mouth Daily., Disp: 90 tablet, Rfl: 1  •  lisinopril-hydrochlorothiazide (PRINZIDE,ZESTORETIC) 10-12.5 MG per tablet, TAKE 1 TABLET BY MOUTH EVERY DAY, Disp: 90 tablet, Rfl: 1  •  melatonin 5 MG tablet tablet, Take 5 mg by mouth Daily., Disp: , Rfl:   •  Multiple Vitamin (MULTIVITAMIN) capsule, Take 1 capsule by mouth Daily., Disp: , Rfl:   •  omeprazole OTC (PRILOSEC OTC) 20 MG EC tablet, Take 20 mg by mouth Daily., Disp: , Rfl:   •  ondansetron (ZOFRAN) 8 MG tablet, Take 8 mg by mouth 3 (Three) Times a Day As Needed., Disp: , Rfl: 0  •  raNITIdine (ZANTAC) 75 MG tablet, Take 75 mg by mouth Every 12 (Twelve) Hours As Needed., Disp: , Rfl:   •  rosuvastatin (CRESTOR) 10 MG tablet, Take 1 tablet by mouth every night at bedtime., Disp: 90 tablet, Rfl: 1  •  hydroxychloroquine (PLAQUENIL) 200 MG tablet, Take 1 tablet by mouth 2 (Two) Times a Day for 99 days. Indications: Rheumatoid Arthritis, Disp: 180 tablet, Rfl: 1

## 2021-07-23 ENCOUNTER — PATIENT OUTREACH (OUTPATIENT)
Dept: CASE MANAGEMENT | Facility: OTHER | Age: 81
End: 2021-07-23

## 2021-07-23 NOTE — OUTREACH NOTE
Ambulatory Case Management Note    Care Evaluation  Questions/Answers      Most Recent Value   Annual Wellness Visit:   Patient Will Schedule [confirms receipt of AWV materials, wants to schedule herself]   Care Gaps Addressed  Other (See Comment)   Care Gap Comments  States annual eye exam was completed 6/23/21 (not diabetic).  Declines need for hearing test.   Other Patient Education/Resources   24/7 [facility] Nurse Call Line, Advanced Care Planning [confirms has RN-ACM contact info & 24 hr nurse triage #]   24/7 Nurse Call Line Education Method  Verbal   ACP Education Method  Send Materials [States children know her wishes and are in agreement.  States ACP materials/AD doc would be helpful though.]   Advanced Directives:  Send Materials   Medication Adherence  Medications understood   Healthy Lifestyle (Self-Efficacy)  self-monitors vital signs appropriately, correctly recognizes signs/symptoms of cardiac distress, correctly recognizes signs/symptoms of pulmonary distress, recognizes when to stop activity, recognizes when to contact medical assistance, self-reports important symptoms to medical professional, other (see comments) [denies pre-syncopy, syncopy, dizziness, chest pain, palps, SOA since last contact]        SDOH updated and reviewed with the patient during this program:     Physical Activity: Sufficiently Active   • Days of Exercise per Week: 5 days   • Minutes of Exercise per Session: 40 min   Patient states her primary activity is working in her garden in the mornings, which she perceives as moderate. Walks daily around the house without need of cane or walker.    Care Plan: Hypertension   Updates made since 7/23/2021 12:00 AM      Problem: SODIUM INTAKE       Goal: Patient will maintain management of sodium consumption       Task: Educate patient on daily sodium intake & how sodium affects the heart Completed 7/23/2021   Responsible User: Shonda Yap, RN      Task: Instruct patient to eat  "less salt and watch fluids. No added salt or no more than 2 grams (2000mgs) of sodium or 2 liters of fluid in a 24-hour period, or follow provider's specific recommendations. Completed 7/23/2021   Responsible User: Shonad Yap RN      Task: Educate patient how to read a nutrition label pointing out servings and serving size Completed 7/23/2021   Responsible User: Shonda Yap RN      Task: Educate patient on sodium alternatives Completed 7/23/2021   Responsible User: Shonda Yap RN      Problem: BLOOD PRESSURE MONITORING       Goal: Establish Regular Follow-Ups with PCP       Task: Determine patient's next PCP visit Completed 7/23/2021   Responsible User: Shonda Yap RN      Task: Discuss schedule for PCP visits with patient Completed 7/23/2021   Responsible User: Shonda Yap RN      Problem: PATIENT IS INACTIVE       Goal: Exercise at least 20 minutes per day       Task: Discuss barriers to activity with patient. Update SDOH. Completed 7/23/2021   Responsible User: Shonda Yap RN      Problem: PATIENT IS HYPERTENSIVE       Goal: Remain at or Below Target Blood Pressure       Task: Educate on keeping a log Completed 7/23/2021   Responsible User: Shonda Yap RN        Patient states \"everything is OK now\".  She denies pre-syncopy, syncopy, dizziness, palpitations, chest pain; states will continue to keep a BP log for a few weeks before PCP appts, checking BP at different times of day.  Next PCP appt confirmed.  Reviewed with patient (see care plan flow sheets for additional details): RN-ACM role & contact info; F/up appts; PCP/specialty appts, Medicare AW materials mailed after last call; ACP, care gaps,  24-hr nurse triage #.    See flow sheet for details re HTN, low sodium diet, activity education discussion - pt verb understanding. States she tries to read nutrition labels, verb understanding re daily NA gm limit and high-NA processed foods. States usually drinks ~6 bottles water/daily. States " systolic BP still usually in low 150''s, diastolic in low 80's. Also limits foods with small seeds d/t diverticulitis hx. Patient's daily activity discussed/encouraged - she verb understanding.    Pt voices no further questions or concerns at this time. Patient has working well towards Care Plan goals and exhibits sense of health self-management and adequate support system with children.  She agrees to follow up outreach, scheduled.     Shonda Yap RN  Ambulatory Case Management    7/23/2021, 16:03 EDT

## 2021-09-02 ENCOUNTER — PATIENT OUTREACH (OUTPATIENT)
Dept: CASE MANAGEMENT | Facility: OTHER | Age: 81
End: 2021-09-02

## 2021-09-02 NOTE — OUTREACH NOTE
Ambulatory Case Management Note    Patient Outreach  RN-ACM follow-up contact completed.      See assessment/care plan flow sheets for details.    Talked with Ms Wang re current health, care plan goals & health needs.  She states she has been doing very well, monitoring BPs, keeping log, reading labels, not adding table salt, staying active.  Denies low symptomatic BPs or BPs >160/90, chest pain, dyspnea, tachycardia, syncope or dizziness since June.  States medications as prescribed. States she has been very very active ellis vegetables since last outreach call, discussed high salt content in many canned foods.  States she has canned 57 jars of grape jelly that she shares with friends & family.    Patient states she continues to live alone, but her son lives nearby and can assist as needed.  States she does not drive, but her son is not employed right now, so he drives her & has flexible schedule. She again denies insecurities re food, transport, medications.    She confirms cardiology appt 9/7/21 and PCP f/up appt 9/22/21. She confirms receipt of both ACP & AWV materials. Declines assist to schedule AWV.  States will review ACP when ellis duties slow down.  Verb she can bring materials to PCP appt for review if she chooses.  Confirms will bring BP logs to both cardio & PCP appts.  Confirms she still has RN-ACM contact info & 24/7 nurse triage #.  She denies immediate issues/concerns but welcomes future RN-ACM outreach calls.      General & Health Literacy Assessment    Questions/Answers      Most Recent Value   Assessment Completed With  Patient   Living Arrangement  Alone [ has strong family support, son visits daily]   Home Care Services  No   Bed or Wheelchair Confined  No   Difficulty Keeping Appointments  No        Care Evaluation    Questions/Answers      Most Recent Value   Suggested Appointments  -- [confirms cardiology appt  9/7/21 & PCP appt 9/22/21]   Annual Wellness Visit:   Patient Will  Schedule   AWV Materials  -- [pt previously confirmed receipt of AWV materials. wants to schedule herself.]   Other Patient Education/Resources   24/7 [facility] Nurse Call Line [Pt confirms she has RN-ACM ph # and 24/7 nurse triage #]   Advanced Directives:  -- [pt states she received ACP materials in August, but has been so busy ellis she hasn't had time to read them. discussed review w/ her family & PCP, she verb understanding]   Medication Adherence  Medications understood   Healthy Lifestyle (Self-Efficacy)  self-monitors vital signs appropriately [States is taking her BP daily and keeping log.  Denies BPs >160/90, states taking meds as directed, denies sx syncopy, dizziness, palps, chest pain since June episodes]      SDOH updated and reviewed with the patient during this program:     Financial Resource Strain: Low Risk    • Difficulty of Paying Living Expenses: Not hard at all       Food Insecurity: No Food Insecurity   • Worried About Running Out of Food in the Last Year: Never true   • Ran Out of Food in the Last Year: Never true       Transportation Needs: No Transportation Needs   • Lack of Transportation (Medical): No   • Lack of Transportation (Non-Medical): No       Housing Stability: Low Risk    • Unable to Pay for Housing in the Last Year: No   • Number of Places Lived in the Last Year: 1   • Unstable Housing in the Last Year: No       Care Plan: Hypertension   Updates made since 9/2/2021 12:00 AM      Problem: SODIUM INTAKE Resolved 9/2/2021   Note:    Reviewed, pt states is monitoring labels & doesn't add salt to food.     Goal: Patient will maintain management of sodium consumption Completed 9/2/2021      Problem: BLOOD PRESSURE MONITORING       Goal: Establish Plan for Regular Lab Work Completed 9/2/2021   Note:    Pt confirms 9/22/21 PCP appt, lab needs to be discussed w/ PCP then.     Goal: Establish Regular Follow-Ups with PCP Completed 9/2/2021   Note:    Confirms cardiology appt  9/7/21 & PCP appt 9/22/21.  States will bring BP records to both appts.     Problem: MEDICATION ADHERENCE       Goal: Consistently take medications as prescribed       Task: Discuss barriers to medication adherence with patient Completed 9/2/2021   Responsible User: Shonda Yap RN      Task: Educate patient on frequency and refill details of meds Completed 9/2/2021   Responsible User: Shonda Yap RN      Task: Assist patients in setting up daily notes/alarms for meds. Consider pill box use Completed 9/2/2021   Responsible User: Shonda Yap RN      Problem: PATIENT IS INACTIVE       Goal: Exercise at least 20 minutes per day       Task: Develop exercise plan with patient Completed 9/2/2021   Responsible User: Shonda Yap RN      Problem: PATIENT IS HYPERTENSIVE       Goal: Remain at or Below Target Blood Pressure       Task: Establish a target blood pressure with the patient in conjunction with PCP Completed 9/2/2021   Responsible User: Shonda Yap RN      Task: Discuss steps to manage BP with patient Completed 9/2/2021   Responsible User: Shonda Yap RN      Task: Educate on disease process and complications associated with noncompliance Completed 9/2/2021   Responsible User: Shonda Yap RN      Care Plan: Nutrition   Updates made since 9/2/2021 12:00 AM      Problem: UNHEALTHY EATING HABITS       Goal: Improve diet and eating habits       Task: Provide resources for diet management such as calorie counting and sodium reduction Completed 9/2/2021   Responsible User: Shonda Yap RN      Task: Discuss healthy food options and preferred restaurants Completed 9/2/2021   Responsible User: Shonda Yap RN Zoe A. Drake, RN  Ambulatory Case Management    9/2/2021, 13:57 EDT

## 2021-09-07 ENCOUNTER — OFFICE VISIT (OUTPATIENT)
Dept: CARDIOLOGY | Facility: CLINIC | Age: 81
End: 2021-09-07

## 2021-09-07 VITALS
SYSTOLIC BLOOD PRESSURE: 140 MMHG | BODY MASS INDEX: 39.08 KG/M2 | HEART RATE: 69 BPM | HEIGHT: 61 IN | OXYGEN SATURATION: 95 % | DIASTOLIC BLOOD PRESSURE: 74 MMHG | WEIGHT: 207 LBS

## 2021-09-07 DIAGNOSIS — I25.10 CORONARY ARTERY DISEASE INVOLVING NATIVE CORONARY ARTERY OF NATIVE HEART WITHOUT ANGINA PECTORIS: Primary | ICD-10-CM

## 2021-09-07 DIAGNOSIS — I10 ESSENTIAL HYPERTENSION: ICD-10-CM

## 2021-09-07 DIAGNOSIS — E78.00 PURE HYPERCHOLESTEROLEMIA: ICD-10-CM

## 2021-09-07 PROCEDURE — 99213 OFFICE O/P EST LOW 20 MIN: CPT | Performed by: INTERNAL MEDICINE

## 2021-09-07 RX ORDER — ROSUVASTATIN CALCIUM 10 MG/1
10 TABLET, COATED ORAL
Qty: 90 TABLET | Refills: 1 | Status: SHIPPED | OUTPATIENT
Start: 2021-09-07 | End: 2022-03-06

## 2021-09-07 RX ORDER — LEVOTHYROXINE SODIUM 0.05 MG/1
50 TABLET ORAL DAILY
Qty: 90 TABLET | Refills: 1 | Status: SHIPPED | OUTPATIENT
Start: 2021-09-07 | End: 2022-03-06

## 2021-09-07 RX ORDER — HYDROXYCHLOROQUINE SULFATE 200 MG/1
TABLET, FILM COATED ORAL
Qty: 180 TABLET | Refills: 1 | Status: SHIPPED | OUTPATIENT
Start: 2021-09-07 | End: 2022-03-06

## 2021-09-07 NOTE — PROGRESS NOTES
"    Subjective:     Encounter Date:09/07/2021      Patient ID: Dannielle Wang is a 81 y.o. female.    Chief Complaint:  History of Present Illness 81-year-old white female with history of coronary disease hypertension hyperlipidemia presents to my office for follow-up.  Patient is currently stable without any symptoms of chest pain or shortness of breath at rest on exertion.  No complains of any PND orthopnea.  No palpitation dizziness syncope or swelling of the feet.  Patient has been taking her medicine regularly.  She does not smoke.  She is trying to exercise regularly she follows a good diet.    The following portions of the patient's history were reviewed and updated as appropriate: allergies, current medications, past family history, past medical history, past social history, past surgical history and problem list.  Past Medical History:   Diagnosis Date   • Coronary artery disease    • Hyperlipidemia    • Hypertension    • Hypothyroidism      Past Surgical History:   Procedure Laterality Date   • JOINT REPLACEMENT     • REPLACEMENT TOTAL KNEE       /74 (BP Location: Left arm, Patient Position: Sitting)   Pulse 69   Ht 154.9 cm (61\")   Wt 93.9 kg (207 lb)   LMP  (LMP Unknown)   SpO2 95%   BMI 39.11 kg/m²   Family History   Problem Relation Age of Onset   • Hypertension Mother    • Stroke Mother    • Heart disease Father    • No Known Problems Sister    • No Known Problems Brother    • No Known Problems Maternal Aunt    • No Known Problems Maternal Uncle    • No Known Problems Paternal Aunt    • No Known Problems Paternal Uncle    • No Known Problems Maternal Grandmother    • No Known Problems Maternal Grandfather    • No Known Problems Paternal Grandmother    • No Known Problems Paternal Grandfather    • No Known Problems Other    • Anemia Neg Hx    • Arrhythmia Neg Hx    • Asthma Neg Hx    • Clotting disorder Neg Hx    • Fainting Neg Hx    • Heart attack Neg Hx    • Heart failure Neg Hx    • " Hyperlipidemia Neg Hx        Current Outpatient Medications:   •  acetaminophen (TYLENOL) 325 MG tablet, Take 325 mg by mouth Every 6 (Six) Hours As Needed., Disp: , Rfl:   •  Ascorbic Acid (VITAMIN C) 500 MG capsule, Take 500 mg by mouth Daily., Disp: , Rfl:   •  calcium carbonate-vitamin d 600-400 MG-UNIT per tablet, Take 600 mg by mouth 2 (Two) Times a Day., Disp: , Rfl:   •  hydroxychloroquine (PLAQUENIL) 200 MG tablet, TAKE 1 TABLET BY MOUTH TWICE DAILY, Disp: 180 tablet, Rfl: 1  •  levothyroxine (SYNTHROID, LEVOTHROID) 50 MCG tablet, TAKE 1 TABLET BY MOUTH DAILY, Disp: 90 tablet, Rfl: 1  •  lisinopril-hydrochlorothiazide (PRINZIDE,ZESTORETIC) 10-12.5 MG per tablet, TAKE 1 TABLET BY MOUTH EVERY DAY, Disp: 90 tablet, Rfl: 1  •  melatonin 5 MG tablet tablet, Take 5 mg by mouth Daily., Disp: , Rfl:   •  Multiple Vitamin (MULTIVITAMIN) capsule, Take 1 capsule by mouth Daily., Disp: , Rfl:   •  omeprazole OTC (PRILOSEC OTC) 20 MG EC tablet, Take 20 mg by mouth Daily., Disp: , Rfl:   •  ondansetron (ZOFRAN) 8 MG tablet, Take 8 mg by mouth 3 (Three) Times a Day As Needed., Disp: , Rfl: 0  •  raNITIdine (ZANTAC) 75 MG tablet, Take 75 mg by mouth Every 12 (Twelve) Hours As Needed., Disp: , Rfl:   •  rosuvastatin (CRESTOR) 10 MG tablet, TAKE 1 TABLET BY MOUTH EVERY NIGHT AT BEDTIME, Disp: 90 tablet, Rfl: 1  Allergies   Allergen Reactions   • Atorvastatin Calcium Myalgia   • Cephalexin Dizziness     Social History     Socioeconomic History   • Marital status:      Spouse name: Not on file   • Number of children: Not on file   • Years of education: Not on file   • Highest education level: Not on file   Tobacco Use   • Smoking status: Never Smoker   • Smokeless tobacco: Never Used   Substance and Sexual Activity   • Alcohol use: No   • Drug use: No   • Sexual activity: Never     Review of Systems   Constitutional: Negative for fever and malaise/fatigue.   Cardiovascular: Negative for chest pain, dyspnea on exertion  and palpitations.   Respiratory: Negative for cough and shortness of breath.    Skin: Negative for rash.   Gastrointestinal: Negative for abdominal pain, nausea and vomiting.   Neurological: Negative for focal weakness and headaches.   All other systems reviewed and are negative.             Objective:     Constitutional:       Appearance: Well-developed.   Eyes:      General: No scleral icterus.     Conjunctiva/sclera: Conjunctivae normal.   HENT:      Head: Normocephalic and atraumatic.   Neck:      Vascular: No carotid bruit or JVD.   Pulmonary:      Effort: Pulmonary effort is normal.      Breath sounds: Normal breath sounds. No wheezing. No rales.   Cardiovascular:      Normal rate. Regular rhythm.   Pulses:     Intact distal pulses.   Abdominal:      General: Bowel sounds are normal.      Palpations: Abdomen is soft.   Musculoskeletal:      Cervical back: Normal range of motion and neck supple. Skin:     General: Skin is warm and dry.      Findings: No rash.   Neurological:      Mental Status: Alert.       Procedures    Lab Review:         [facility]  1.  Coronary artery disease  Patient has nonobstructive disease now with normal LV systolic function is currently stable on medications  2.  Hypertension  Patient blood pressure currently stable on lisinopril hydrochlorothiazide  3.  Hyperlipidemia  Patient is currently on Crestor and the lipid levels are well within normal limits.      Patient's previous medical records, labs, and EKG were reviewed and discussed with the patient at today's visit.

## 2021-09-22 ENCOUNTER — OFFICE VISIT (OUTPATIENT)
Dept: FAMILY MEDICINE CLINIC | Facility: CLINIC | Age: 81
End: 2021-09-22

## 2021-09-22 VITALS
OXYGEN SATURATION: 96 % | HEIGHT: 61 IN | TEMPERATURE: 97.3 F | WEIGHT: 209.4 LBS | HEART RATE: 82 BPM | SYSTOLIC BLOOD PRESSURE: 146 MMHG | DIASTOLIC BLOOD PRESSURE: 78 MMHG | BODY MASS INDEX: 39.53 KG/M2

## 2021-09-22 DIAGNOSIS — R79.89 ELEVATED SERUM CREATININE: ICD-10-CM

## 2021-09-22 DIAGNOSIS — E03.9 ACQUIRED HYPOTHYROIDISM: ICD-10-CM

## 2021-09-22 DIAGNOSIS — E78.2 MIXED HYPERLIPIDEMIA: Primary | ICD-10-CM

## 2021-09-22 DIAGNOSIS — E66.01 CLASS 2 SEVERE OBESITY DUE TO EXCESS CALORIES WITH SERIOUS COMORBIDITY AND BODY MASS INDEX (BMI) OF 39.0 TO 39.9 IN ADULT (HCC): ICD-10-CM

## 2021-09-22 PROBLEM — E66.812 CLASS 2 SEVERE OBESITY DUE TO EXCESS CALORIES WITH SERIOUS COMORBIDITY AND BODY MASS INDEX (BMI) OF 39.0 TO 39.9 IN ADULT: Status: ACTIVE | Noted: 2021-09-22

## 2021-09-22 PROCEDURE — 99213 OFFICE O/P EST LOW 20 MIN: CPT | Performed by: NURSE PRACTITIONER

## 2021-09-22 NOTE — PATIENT INSTRUCTIONS
Fasting blood work  Flu shot in October  Third Covid shot in December if desired  Keep appointment next year for colonoscopy  Follow-up 6 months

## 2021-09-22 NOTE — PROGRESS NOTES
"    Dannielle Wang is a 81 y.o. female.     81-year-old white female with history of CAD, hypertension, hypothyroidism, TIA, arthritis, bunions and hysterectomy who comes in today for follow-up visit.  Blood pressure 146/78 heart rate 82 she denies any chest pain, dyspnea, tachycardia or dizziness. Patient had a syncopal episode back in  April but states she hasn't had any issues since then and has seen cardiology last month    Weight is down 1 pound to 1 9 with a BMI of 39.6  Patient has had both Covid vaccines is up-to-date on eye exam mammogram DEXA scan and colonoscopy. Patient's colonoscopy revealed multiple polyps and she has a 1 year follow-up scheduled              Fasting blood work  Flu shot in October  Third Covid shot in December if desired  Keep appointment next year for colonoscopy  Follow-up 6 months       The following portions of the patient's history were reviewed and updated as appropriate: allergies, current medications, past family history, past medical history, past social history, past surgical history and problem list.    Vitals:    09/22/21 1344   BP: 146/78   BP Location: Right arm   Patient Position: Sitting   Cuff Size: Large Adult   Pulse: 82   Temp: 97.3 °F (36.3 °C)   TempSrc: Temporal   SpO2: 96%   Weight: 95 kg (209 lb 6.4 oz)   Height: 154.9 cm (61\")     Body mass index is 39.57 kg/m².    Past Medical History:   Diagnosis Date   • Coronary artery disease    • Hyperlipidemia    • Hypertension    • Hypothyroidism      Past Surgical History:   Procedure Laterality Date   • JOINT REPLACEMENT     • REPLACEMENT TOTAL KNEE       Family History   Problem Relation Age of Onset   • Hypertension Mother    • Stroke Mother    • Heart disease Father    • No Known Problems Sister    • No Known Problems Brother    • No Known Problems Maternal Aunt    • No Known Problems Maternal Uncle    • No Known Problems Paternal Aunt    • No Known Problems Paternal Uncle    • No Known Problems Maternal " Grandmother    • No Known Problems Maternal Grandfather    • No Known Problems Paternal Grandmother    • No Known Problems Paternal Grandfather    • No Known Problems Other    • Anemia Neg Hx    • Arrhythmia Neg Hx    • Asthma Neg Hx    • Clotting disorder Neg Hx    • Fainting Neg Hx    • Heart attack Neg Hx    • Heart failure Neg Hx    • Hyperlipidemia Neg Hx      Immunization History   Administered Date(s) Administered   • COVID-19 (MODERNA) 01/15/2021, 02/12/2021   • Fluad Quad 65+ 12/19/2019, 12/17/2020   • Fluzone High Dose =>65 Years ([employer] ONLY) 09/26/2016, 10/16/2018   • PPD Test 06/02/2004   • Pneumococcal Polysaccharide (PPSV23) 12/17/2020       Admission on 06/11/2021, Discharged on 06/11/2021   Component Date Value Ref Range Status   • QT Interval 06/11/2021 404  ms Final   • Glucose 06/11/2021 98  65 - 99 mg/dL Final   • BUN 06/11/2021 17  8 - 23 mg/dL Final   • Creatinine 06/11/2021 1.08* 0.57 - 1.00 mg/dL Final   • Sodium 06/11/2021 138  136 - 145 mmol/L Final   • Potassium 06/11/2021 3.8  3.5 - 5.2 mmol/L Final   • Chloride 06/11/2021 102  98 - 107 mmol/L Final   • CO2 06/11/2021 26.0  22.0 - 29.0 mmol/L Final   • Calcium 06/11/2021 9.5  8.6 - 10.5 mg/dL Final   • eGFR Non African Amer 06/11/2021 49* >60 mL/min/1.73 Final   • BUN/Creatinine Ratio 06/11/2021 15.7  7.0 - 25.0 Final   • Anion Gap 06/11/2021 10.0  5.0 - 15.0 mmol/L Final   • WBC 06/11/2021 5.20  3.40 - 10.80 10*3/mm3 Final   • RBC 06/11/2021 4.64  3.77 - 5.28 10*6/mm3 Final   • Hemoglobin 06/11/2021 14.1  12.0 - 15.9 g/dL Final   • Hematocrit 06/11/2021 40.8  34.0 - 46.6 % Final   • MCV 06/11/2021 87.7  79.0 - 97.0 fL Final   • MCH 06/11/2021 30.3  26.6 - 33.0 pg Final   • MCHC 06/11/2021 34.6  31.5 - 35.7 g/dL Final   • RDW 06/11/2021 14.7  12.3 - 15.4 % Final   • RDW-SD 06/11/2021 45.5  37.0 - 54.0 fl Final   • MPV 06/11/2021 7.7  6.0 - 12.0 fL Final   • Platelets 06/11/2021 184  140 - 450 10*3/mm3 Final   • Neutrophil % 06/11/2021  60.3  42.7 - 76.0 % Final   • Lymphocyte % 06/11/2021 28.1  19.6 - 45.3 % Final   • Monocyte % 06/11/2021 8.3  5.0 - 12.0 % Final   • Eosinophil % 06/11/2021 1.9  0.3 - 6.2 % Final   • Basophil % 06/11/2021 1.4  0.0 - 1.5 % Final   • Neutrophils, Absolute 06/11/2021 3.10  1.70 - 7.00 10*3/mm3 Final   • Lymphocytes, Absolute 06/11/2021 1.50  0.70 - 3.10 10*3/mm3 Final   • Monocytes, Absolute 06/11/2021 0.40  0.10 - 0.90 10*3/mm3 Final   • Eosinophils, Absolute 06/11/2021 0.10  0.00 - 0.40 10*3/mm3 Final   • Basophils, Absolute 06/11/2021 0.10  0.00 - 0.20 10*3/mm3 Final   • nRBC 06/11/2021 0.1  0.0 - 0.2 /100 WBC Final         Review of Systems   Constitutional: Negative.    HENT: Negative.    Cardiovascular: Negative.    Gastrointestinal: Negative.    Genitourinary: Negative.    Musculoskeletal: Negative.    Skin: Negative.    Neurological: Negative.    Psychiatric/Behavioral: Negative.        Objective   Physical Exam  Constitutional:       Appearance: Normal appearance.   HENT:      Head: Normocephalic.   Cardiovascular:      Rate and Rhythm: Normal rate and regular rhythm.      Pulses: Normal pulses.      Heart sounds: Normal heart sounds.   Pulmonary:      Effort: Pulmonary effort is normal.      Breath sounds: Normal breath sounds.   Abdominal:      General: Bowel sounds are normal.   Musculoskeletal:         General: Normal range of motion.   Skin:     General: Skin is warm and dry.   Neurological:      General: No focal deficit present.      Mental Status: She is alert and oriented to person, place, and time.   Psychiatric:         Mood and Affect: Mood normal.         Procedures    Assessment/Plan   Diagnoses and all orders for this visit:    1. Mixed hyperlipidemia (Primary)  -     Lipid Panel With LDL / HDL Ratio    2. Acquired hypothyroidism  -     TSH+Free T4  -     T3    3. Elevated serum creatinine  -     Comprehensive Metabolic Panel    4. Class 2 severe obesity due to excess calories with serious  comorbidity and body mass index (BMI) of 39.0 to 39.9 in adult (HCC)          Current Outpatient Medications:   •  acetaminophen (TYLENOL) 325 MG tablet, Take 325 mg by mouth Every 6 (Six) Hours As Needed., Disp: , Rfl:   •  Ascorbic Acid (VITAMIN C) 500 MG capsule, Take 500 mg by mouth Daily., Disp: , Rfl:   •  calcium carbonate-vitamin d 600-400 MG-UNIT per tablet, Take 600 mg by mouth 2 (Two) Times a Day., Disp: , Rfl:   •  hydroxychloroquine (PLAQUENIL) 200 MG tablet, TAKE 1 TABLET BY MOUTH TWICE DAILY, Disp: 180 tablet, Rfl: 1  •  levothyroxine (SYNTHROID, LEVOTHROID) 50 MCG tablet, TAKE 1 TABLET BY MOUTH DAILY, Disp: 90 tablet, Rfl: 1  •  lisinopril-hydrochlorothiazide (PRINZIDE,ZESTORETIC) 10-12.5 MG per tablet, TAKE 1 TABLET BY MOUTH EVERY DAY, Disp: 90 tablet, Rfl: 1  •  melatonin 5 MG tablet tablet, Take 5 mg by mouth Daily., Disp: , Rfl:   •  Multiple Vitamin (MULTIVITAMIN) capsule, Take 1 capsule by mouth Daily., Disp: , Rfl:   •  omeprazole OTC (PRILOSEC OTC) 20 MG EC tablet, Take 20 mg by mouth Daily., Disp: , Rfl:   •  ondansetron (ZOFRAN) 8 MG tablet, Take 8 mg by mouth 3 (Three) Times a Day As Needed., Disp: , Rfl: 0  •  raNITIdine (ZANTAC) 75 MG tablet, Take 75 mg by mouth Every 12 (Twelve) Hours As Needed., Disp: , Rfl:   •  rosuvastatin (CRESTOR) 10 MG tablet, TAKE 1 TABLET BY MOUTH EVERY NIGHT AT BEDTIME, Disp: 90 tablet, Rfl: 1

## 2021-09-23 LAB
ALBUMIN SERPL-MCNC: 4.2 G/DL (ref 3.6–4.6)
ALBUMIN/GLOB SERPL: 1.5 {RATIO} (ref 1.2–2.2)
ALP SERPL-CCNC: 61 IU/L (ref 44–121)
ALT SERPL-CCNC: 20 IU/L (ref 0–32)
AST SERPL-CCNC: 22 IU/L (ref 0–40)
BILIRUB SERPL-MCNC: 0.5 MG/DL (ref 0–1.2)
BUN SERPL-MCNC: 17 MG/DL (ref 8–27)
BUN/CREAT SERPL: 15 (ref 12–28)
CALCIUM SERPL-MCNC: 9.7 MG/DL (ref 8.7–10.3)
CHLORIDE SERPL-SCNC: 102 MMOL/L (ref 96–106)
CHOLEST SERPL-MCNC: 182 MG/DL (ref 100–199)
CO2 SERPL-SCNC: 24 MMOL/L (ref 20–29)
CREAT SERPL-MCNC: 1.11 MG/DL (ref 0.57–1)
GLOBULIN SER CALC-MCNC: 2.8 G/DL (ref 1.5–4.5)
GLUCOSE SERPL-MCNC: 82 MG/DL (ref 65–99)
HDLC SERPL-MCNC: 61 MG/DL
LDLC SERPL CALC-MCNC: 96 MG/DL (ref 0–99)
LDLC/HDLC SERPL: 1.6 RATIO (ref 0–3.2)
POTASSIUM SERPL-SCNC: 4.1 MMOL/L (ref 3.5–5.2)
PROT SERPL-MCNC: 7 G/DL (ref 6–8.5)
SODIUM SERPL-SCNC: 143 MMOL/L (ref 134–144)
T3 SERPL-MCNC: 103 NG/DL (ref 71–180)
T4 FREE SERPL-MCNC: 1.41 NG/DL (ref 0.82–1.77)
TRIGL SERPL-MCNC: 145 MG/DL (ref 0–149)
TSH SERPL DL<=0.005 MIU/L-ACNC: 1.39 UIU/ML (ref 0.45–4.5)
VLDLC SERPL CALC-MCNC: 25 MG/DL (ref 5–40)

## 2021-11-05 ENCOUNTER — PATIENT OUTREACH (OUTPATIENT)
Dept: CASE MANAGEMENT | Facility: OTHER | Age: 81
End: 2021-11-05

## 2021-11-05 NOTE — OUTREACH NOTE
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  Discussed identifying heatlhcare surrogate (states she has 9 children) & she VU.]   Medication Adherence Medications understood   Healthy Lifestyle (Self-Efficacy) self-monitors vital signs appropriately,  correctly recognizes signs/symptoms of cardiac distress,  correctly recognizes signs/symptoms of pulmonary distress,  recognizes when to stop activity,  recognizes when to contact medical assistance,  self-reports important symptoms to medical professional        SDOH updated and reviewed with the patient during this program:   Patient reports no changes in SDOH except is less active now that gardening season is over.  She VU re increasing daily activities in the house.  States she will be cleaning house over the next few weeks in prep for [facility] guests.     Financial Resource Strain: Low Risk    • Difficulty of Paying Living Expenses: Not hard at all       Physical Activity: Insufficiently Active   • Days of Exercise per Week: 2 days   • Minutes of Exercise per Session: 20 min       Food Insecurity: No Food Insecurity   • Worried About Running Out of Food in the Last Year: Never true   • Ran Out of Food in the Last Year: Never true       Transportation Needs: No Transportation Needs   • Lack of Transportation (Medical): No   • Lack of Transportation (Non-Medical): No       Housing Stability: Low Risk    • Unable  to Pay for Housing in the Last Year: No   • Number of Places Lived in the Last Year: 1   • Unstable Housing in the Last Year: No     RN-ACM completed routine f/up outreach on first call attempt.  See flow sheet for details.    Ms Wang reports she is doing well, but is less active since gardening season is over - indoor activities encouraged and she VU. [facility] plans housekeeping over the next few weeks in prep for 20 guests at [employer].  Was encouraged to get flu shot next week and she VU - [facility] will get it at PCP office.  [facility] BPs OK, running ~145/78 recently, is keeping log, not adding salt.  [facility] has reviewed ACP, but is still determining what she wants.  Discussed identifying healthcare surrogate & discuss her options with that person, since she has 9 children - she VU re this. She declines AWV d/t holidays - wants to postpone, HM updated, plan revisit AWV appt in 2022.  Paitent otherwise doing well, denies needs/questions/concerns. She verb appreciation for the call and confirms next PCP appt 3/22/22.    Shonda Yap RN  Ambulatory Case Management  11/5/2021, 14:03 EDT

## 2021-11-28 ENCOUNTER — APPOINTMENT (OUTPATIENT)
Dept: GENERAL RADIOLOGY | Facility: HOSPITAL | Age: 81
End: 2021-11-28

## 2021-11-28 ENCOUNTER — HOSPITAL ENCOUNTER (EMERGENCY)
Facility: HOSPITAL | Age: 81
Discharge: HOME | End: 2021-11-28
Admitting: EMERGENCY MEDICINE

## 2021-11-28 VITALS
HEIGHT: 61 IN | TEMPERATURE: 98.9 F | OXYGEN SATURATION: 95 % | RESPIRATION RATE: 16 BRPM | BODY MASS INDEX: 40.4 KG/M2 | HEART RATE: 66 BPM | WEIGHT: 214 LBS | DIASTOLIC BLOOD PRESSURE: 79 MMHG | SYSTOLIC BLOOD PRESSURE: 148 MMHG
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
## 2021-11-28 DIAGNOSIS — S20.212A CONTUSION OF LEFT CHEST WALL, INITIAL ENCOUNTER: ICD-10-CM

## 2021-11-28 PROCEDURE — 99283 EMERGENCY DEPT VISIT LOW MDM: CPT

## 2021-11-28 PROCEDURE — 71101 X-RAY EXAM UNILAT RIBS/CHEST: CPT

## 2021-11-28 RX ORDER — HYDROCODONE BITARTRATE AND ACETAMINOPHEN 5; 325 MG/1; MG/1
1 TABLET ORAL ONCE AS NEEDED
Status: COMPLETED | OUTPATIENT
Start: 2021-11-28 | End: 2021-11-28

## 2021-11-28 RX ORDER — HYDROCODONE BITARTRATE AND ACETAMINOPHEN 5; 325 MG/1; MG/1
1 TABLET ORAL EVERY 6 HOURS PRN
Qty: 12 TABLET | Refills: 0 | Status: SHIPPED | OUTPATIENT
Start: 2021-11-28 | End: 2022-02-28

## 2021-11-28 RX ORDER — ACETAMINOPHEN 500 MG
1000 TABLET ORAL ONCE
Status: DISCONTINUED | OUTPATIENT
Start: 2021-11-28 | End: 2021-11-28

## 2021-11-28 RX ADMIN — HYDROCODONE BITARTRATE AND ACETAMINOPHEN 1 TABLET: 5; 325 TABLET ORAL at 11:30

## 2021-12-06 RX ORDER — LISINOPRIL AND HYDROCHLOROTHIAZIDE 12.5; 1 MG/1; MG/1
TABLET ORAL
Qty: 90 TABLET | Refills: 3 | Status: SHIPPED | OUTPATIENT
Start: 2021-12-06 | End: 2022-12-08

## 2021-12-06 NOTE — TELEPHONE ENCOUNTER
Rx Refill Note  Requested Prescriptions     Pending Prescriptions Disp Refills   • lisinopril-hydrochlorothiazide (PRINZIDE,ZESTORETIC) 10-12.5 MG per tablet [Pharmacy Med Name: LISINOPRIL-HCTZ 10/12.5MG TABLETS] 90 tablet 1     Sig: TAKE 1 TABLET BY MOUTH EVERY DAY      Last office visit with prescribing clinician: 9/7/2021      Next office visit with prescribing clinician: Visit date not found            Elvia Benjamin MA  12/06/21, 07:03 EST

## 2021-12-09 ENCOUNTER — PATIENT OUTREACH (OUTPATIENT)
Dept: CASE MANAGEMENT | Facility: OTHER | Age: 81
End: 2021-12-09

## 2021-12-09 NOTE — OUTREACH NOTE
"Ambulatory Case Management Note    General & Health Literacy Assessment  Questions/Answers      Most Recent Value   Assessment Completed With Patient   Living Arrangement Alone  [in quarantine at home until 12/11/21 d/t TGD CV-19 exposure.]   Equiptment Used at Home --  [states using incentive spirometer Q 1 hr, avg vol 1000, sometimes higher. Rib pain improving. Denies new cough, F/C or SOA.]      Care Evaluation  Questions/Answers      Most Recent Value   Annual Wellness Visit:  Patient Has Completed   Care Gaps Addressed Flu Shot   Flu Shot Status Patient will Complete   Flu Shot Completion at [facility] or Other Other   Other Location pharmacy   Care Gap Comments PT VU to get flu shot asap after quarantine is completed   Other Patient Education/Resources  --  [Patient states has all resource phone #s]   Medication Adherence Medications understood   Healthy Lifestyle (Self-Efficacy) self-monitors vital signs appropriately,  correctly recognizes signs/symptoms of cardiac distress,  correctly recognizes signs/symptoms of pulmonary distress,  recognizes when to stop activity,  recognizes when to contact medical assistance,  self-reports important symptoms to medical professional  [states BPs still well controlled, no significant changes since last conversation.]        RN-ACM f/up outreach completed.     See assessment/care plan flow sheets for details.    Patient states \"I'm fine\", all sx improving since 11/28/21 ED visit for fall & L rib/arm pain.    States no falls since, states tripped over a grand child that got underfoot on TGD.   States doesn't need DME to ambulate, ribs & arm less sore, using incentive spirometer Q 1 hr, denies new cough, SOA, F/C.  States BPs continue to be well-controlled, monitors dietary salt.    States currently in quarantine d/t exposure to Covid19 on TGD. States she has been/is well, denies sx. States quarantine completes 12/11/21.    Denies insecurities: states grand dtr brings her food " and leaves it on the porch.    Flu vaccine strongly encouraged and she VU.  Confirms PCP 6 mo f/up 3/22/22, will call as needed.  Again VU re sx requiring immediate medical care.    Ms Wang is progressing well towards care plan goals and exhibits continued sense of health self-management with adequate support system.     HRCM status changed to monitoring: program graduation pending Q 30 day chart review for red flags x 90 days.    Shonda Yap RN  Ambulatory Case Management  12/9/2021, 12:41 EST

## 2022-01-18 ENCOUNTER — PATIENT OUTREACH (OUTPATIENT)
Dept: CASE MANAGEMENT | Facility: OTHER | Age: 82
End: 2022-01-18

## 2022-01-18 NOTE — OUTREACH NOTE
Ambulatory Case Management Note    Patient Outreach  Routine RN-ACM f/up outreach re falls, flu vaccine, CV19 #3 vaccine, ACP & activity.  Patient  has not had any falls since TGD.  States d/t weather physical activity is limited, also  has not gotten flu shot or CV19 #3 -  doesn't drive so transportation is challenging, had prior bad experience w/ [employer], so won't use it again. States all her children now work, but admits she could have one of them take her to [facility] after work to get a flu shot - was encouraged to do so.  [facility] she did just order 2 free government at-home CV19 tests.  CV19 and CV19 vaccine, flu vaccine ed given and she VU.    has not yet discussed ACP w her family - was again encouraged & she VU.  Has PCP f/up 3/22/22.  Denies other needs/questions at this time. Verb appreciation for the call, confirms has RN-ACM contact information. RN-ACM 60-day chart review scheduled.    Shonda Yap RN  Ambulatory Case Management  1/18/2022, 16:53 EST

## 2022-02-16 ENCOUNTER — HOSPITAL ENCOUNTER (EMERGENCY)
Facility: HOSPITAL | Age: 82
Discharge: HOME | End: 2022-02-16
Attending: EMERGENCY MEDICINE | Admitting: EMERGENCY MEDICINE

## 2022-02-16 VITALS
OXYGEN SATURATION: 95 % | DIASTOLIC BLOOD PRESSURE: 80 MMHG | WEIGHT: 216.8 LBS | HEART RATE: 77 BPM | BODY MASS INDEX: 39.9 KG/M2 | TEMPERATURE: 97.4 F | SYSTOLIC BLOOD PRESSURE: 166 MMHG | HEIGHT: 62 IN | RESPIRATION RATE: 18 BRPM

## 2022-02-16 DIAGNOSIS — H66.002 NON-RECURRENT ACUTE SUPPURATIVE OTITIS MEDIA OF LEFT EAR WITHOUT SPONTANEOUS RUPTURE OF TYMPANIC MEMBRANE: Primary | ICD-10-CM

## 2022-02-16 DIAGNOSIS — Z20.822 COVID-19 RULED OUT BY LABORATORY TESTING: ICD-10-CM

## 2022-02-16 DIAGNOSIS — H60.392 OTHER INFECTIVE ACUTE OTITIS EXTERNA OF LEFT EAR: ICD-10-CM

## 2022-02-16 LAB — SARS-COV-2 RNA PNL SPEC NAA+PROBE: NOT DETECTED

## 2022-02-16 PROCEDURE — 99283 EMERGENCY DEPT VISIT LOW MDM: CPT

## 2022-02-16 PROCEDURE — C9803 HOPD COVID-19 SPEC COLLECT: HCPCS

## 2022-02-16 PROCEDURE — 87635 SARS-COV-2 COVID-19 AMP PRB: CPT

## 2022-02-16 RX ORDER — FLUTICASONE PROPIONATE 50 MCG
2 SPRAY, SUSPENSION (ML) NASAL DAILY
Qty: 9.9 ML | Refills: 0 | Status: SHIPPED | OUTPATIENT
Start: 2022-02-16 | End: 2022-02-16 | Stop reason: SDUPTHER

## 2022-02-16 RX ORDER — FLUTICASONE PROPIONATE 50 MCG
2 SPRAY, SUSPENSION (ML) NASAL DAILY
Qty: 9.9 ML | Refills: 0 | Status: SHIPPED | OUTPATIENT
Start: 2022-02-16

## 2022-02-16 RX ORDER — LEVOFLOXACIN 250 MG/1
250 TABLET ORAL DAILY
Qty: 5 TABLET | Refills: 0 | Status: SHIPPED | OUTPATIENT
Start: 2022-02-16 | End: 2022-02-24

## 2022-02-16 RX ORDER — LEVOFLOXACIN 250 MG/1
250 TABLET ORAL DAILY
Qty: 5 TABLET | Refills: 0 | Status: SHIPPED | OUTPATIENT
Start: 2022-02-16 | End: 2022-02-16 | Stop reason: SDUPTHER

## 2022-02-16 NOTE — ED NOTES
"Pt states her nose is \"stuffed up.\" No drainage. Pressure on left ear. States it is difficult to see her PCP. No other complaints.      Farnaz Webb RN  02/16/22 [id number]    "

## 2022-02-16 NOTE — DISCHARGE INSTRUCTIONS
Medication as directed, make sure to finish the antibiotic  Tylenol or ibuprofen for discomfort  Follow-up with primary care provider or ear nose and throat doctor  Elevate head tonight

## 2022-02-16 NOTE — ED PROVIDER NOTES
Subjective   82-year-old female complaining of congestion and right ear pain.  The patient reports that she has had subjective fever as well as chills.  She reports he has had an occasional cough.  She reports that she has had no vomiting or diarrhea.  She denies night sweats or hemoptysis.  She reports no neck pain or stiffness.  She reports that she has had no lower extremity swelling.  She states she has had the primary series of COVID vaccination          Review of Systems   HENT: Positive for ear pain, facial swelling and sore throat. Negative for nosebleeds and trouble swallowing.    Respiratory: Negative for cough.    Musculoskeletal: Negative for neck pain and neck stiffness.   Hematological: Positive for adenopathy.   All other systems reviewed and are negative.      Past Medical History:   Diagnosis Date   • Coronary artery disease    • Hyperlipidemia    • Hypertension    • Hypothyroidism        Allergies   Allergen Reactions   • Atorvastatin Calcium Myalgia   • Cephalexin Dizziness       Past Surgical History:   Procedure Laterality Date   • JOINT REPLACEMENT     • REPLACEMENT TOTAL KNEE         Family History   Problem Relation Age of Onset   • Hypertension Mother    • Stroke Mother    • Heart disease Father    • No Known Problems Sister    • No Known Problems Brother    • No Known Problems Maternal Aunt    • No Known Problems Maternal Uncle    • No Known Problems Paternal Aunt    • No Known Problems Paternal Uncle    • No Known Problems Maternal Grandmother    • No Known Problems Maternal Grandfather    • No Known Problems Paternal Grandmother    • No Known Problems Paternal Grandfather    • No Known Problems Other    • Anemia Neg Hx    • Arrhythmia Neg Hx    • Asthma Neg Hx    • Clotting disorder Neg Hx    • Fainting Neg Hx    • Heart attack Neg Hx    • Heart failure Neg Hx    • Hyperlipidemia Neg Hx        Social History     Socioeconomic History   • Marital status:    Tobacco Use   • Smoking  status: Never Smoker   • Smokeless tobacco: Never Used   Substance and Sexual Activity   • Alcohol use: No   • Drug use: No   • Sexual activity: Never           Objective   Physical Exam  Alert Tenino Coma Scale 15   HEENT: Pupils equal and reactive to light.  Conjunctiva not injected the left tympanic membrane is erythematous left ear canal is also erythematous and boggy with some yellowish drainage   Neck: Supple. Midline trachea. No JVD. No goiter.   Chest: Clear and equal breath sounds bilaterally regular rate and rhythm without murmur or rub.   Abdomen: Positive bowel sounds nontender nondistended. No rebound or peritoneal signs. No CVA tenderness.   Extremities no clubbing cyanosis or edema motor sensory exam is normal the full range of motion is intact   skin: Warm and dry, no rashes or petechia.   Lymphatic: Left-sided posterior auricular and anterior cervical regional lymphadenopathy. No calf pain, swelling or Dyllan's sign    Procedures           ED Course  ED Course as of 02/16/22 1756   Wed Feb 16, 2022   1735 During pandemic conditions patient was in the waiting room for 2 and half hours prior to being brought to the treatment area [TH]      ED Course User Index  [TH] Robbie Vick MD                                                 [facility]  Number of Diagnoses or Management Options     Amount and/or Complexity of Data Reviewed  Clinical lab tests: reviewed    Risk of Complications, Morbidity, and/or Mortality  Presenting problems: high  Diagnostic procedures: high  Management options: high  General comments: The patient will be started on Flonase, Levaquin, Cortisporin otic.  The patient was stable at discharge and agreeable to this plan of treatment she was encouraged to follow-up with her primary care provider or alternatively ENT        Final diagnoses:   Non-recurrent acute suppurative otitis media of left ear without spontaneous rupture of tympanic membrane   Other infective acute otitis externa of  left ear   COVID-19 ruled out by laboratory testing       ED Disposition  ED Disposition     ED Disposition Condition Comment    Discharge Stable           Sylvia Almeida, APRN  [address]  [address]  [place] [id number] [phone]          [facility]  [address]  [facility] [address]  [phone]  Call            Medication List      No changes were made to your prescriptions during this visit.          Robbie Vick MD  02/16/22 0545

## 2022-02-22 ENCOUNTER — PATIENT OUTREACH (OUTPATIENT)
Dept: CASE MANAGEMENT | Facility: OTHER | Age: 82
End: 2022-02-22

## 2022-02-22 NOTE — OUTREACH NOTE
Ambulatory Case Management Note    Patient Outreach  RN-ACM outreach completed w/ Ms Felipe on 3rd contact attempt re 2/16/22 ED visit for DX suppurative otitis media L ear.    She states finished levaquin yesterday, ear pressure improved some, but thick yellow-green nasal congestion conts w/ occasional cough.   States [facility] & 3 local pharmacies unable to fill otic suspension RX  Requests more or new antibiotic & new otic RX - wants all RXs to go to [facility]    States continues QD fluticasone, denies F/C, N/V - eating/drinking well.  VU re good hydration.    States unable to fill otic gtts RX at 3 pharmacies, states thinks RX was transferred to [facility], unsure.     States hasn't scheduled f/up appt w/ PCP or ENT d/t no daytime transport. States son's truck broke down; grand daughter can drive her after 5 pm & get her groceries and meds. Has Medicaid, but is firm she won't use [employer] d/t past experiences w/ unreliability & waits.  Confirms no video capability, but willing to do phone f/up w/ PCP, agrees to RN-ACM assist re this.  Discussed use of [facility] after hours facility, rather than ED.          Care Coordination  Rhona at [facility] hub states PCP cannot do telephone appt in this scenario, patient will need to come in.  Care Coordination  MAX mendez  [phone] to inquire re insurance provided daytime transport to medical appts.  Patient Outreach  Patient advised [facility] on [address] has appts after 5 pm, she VU & took that ph #.  She VU to call [facility] re otic gtts RX, start asap.  She VU RN-ACM will advise PCP office re continued green-yellow nasal congestion, daytime transport issue, question re antibiotic.    Care Coordination  Amanda at [facility] pharmacy [phone] states no RXs received since 1/7/22, no Feb otic suspension RX on file there.  Care Coordination  Chinyere at [facility] states otic RX was transferred to [facility]  Care Coordination  Ronaldo at [facility] pharmacy states otic suspension RX on back order until April. She DC'd call during transfer when asked re substitute otic RX  Care Coordination  Messaged PCP clinical pool re pt cannot come to office, but has continued yellow-green thick nasal congestion, atb finished yesterday & no otic gtts filled.  Patient Outreach  Patient  PCP office advised and she is to f/up w/ PCP office or [facility] tomorrow if not better. 90-day CM chart review due in March.      Shonda Yap, RN  Ambulatory Case Management  2/22/2022, 14:05 EST

## 2022-02-24 RX ORDER — OFLOXACIN 3 MG/ML
10 SOLUTION AURICULAR (OTIC) DAILY
Qty: 10 ML | Refills: 1 | Status: SHIPPED | OUTPATIENT
Start: 2022-02-24 | End: 2022-02-28

## 2022-02-27 ENCOUNTER — APPOINTMENT (OUTPATIENT)
Dept: GENERAL RADIOLOGY | Facility: HOSPITAL | Age: 82
End: 2022-02-27

## 2022-02-27 ENCOUNTER — HOSPITAL ENCOUNTER (EMERGENCY)
Facility: HOSPITAL | Age: 82
Discharge: HOME | End: 2022-02-28
Admitting: EMERGENCY MEDICINE

## 2022-02-27 DIAGNOSIS — R68.89 MULTIPLE COMPLAINTS: Primary | ICD-10-CM

## 2022-02-27 DIAGNOSIS — R07.89 ATYPICAL CHEST PAIN: ICD-10-CM

## 2022-02-27 LAB
ALBUMIN SERPL-MCNC: 3.9 G/DL (ref 3.5–5.2)
ALBUMIN/GLOB SERPL: 1.4 G/DL
ALP SERPL-CCNC: 48 U/L (ref 39–117)
ALT SERPL W P-5'-P-CCNC: 13 U/L (ref 1–33)
ANION GAP SERPL CALCULATED.3IONS-SCNC: 14 MMOL/L (ref 5–15)
AST SERPL-CCNC: 18 U/L (ref 1–32)
B PARAPERT DNA SPEC QL NAA+PROBE: NOT DETECTED
B PERT DNA SPEC QL NAA+PROBE: NOT DETECTED
BASOPHILS # BLD AUTO: 0.1 10*3/MM3 (ref 0–0.2)
BASOPHILS NFR BLD AUTO: 1.4 % (ref 0–1.5)
BILIRUB SERPL-MCNC: 0.5 MG/DL (ref 0–1.2)
BUN SERPL-MCNC: 12 MG/DL (ref 8–23)
BUN/CREAT SERPL: 10.9 (ref 7–25)
C PNEUM DNA NPH QL NAA+NON-PROBE: NOT DETECTED
CALCIUM SPEC-SCNC: 9.4 MG/DL (ref 8.6–10.5)
CHLORIDE SERPL-SCNC: 101 MMOL/L (ref 98–107)
CO2 SERPL-SCNC: 23 MMOL/L (ref 22–29)
CREAT SERPL-MCNC: 1.1 MG/DL (ref 0.57–1)
D DIMER PPP FEU-MCNC: 0.55 MG/L (FEU) (ref 0–0.59)
DEPRECATED RDW RBC AUTO: 45.9 FL (ref 37–54)
EOSINOPHIL # BLD AUTO: 0.2 10*3/MM3 (ref 0–0.4)
EOSINOPHIL NFR BLD AUTO: 2.7 % (ref 0.3–6.2)
ERYTHROCYTE [DISTWIDTH] IN BLOOD BY AUTOMATED COUNT: 14.9 % (ref 12.3–15.4)
FLUAV SUBTYP SPEC NAA+PROBE: NOT DETECTED
FLUBV RNA ISLT QL NAA+PROBE: NOT DETECTED
GFR SERPL CREATININE-BSD FRML MDRD: 47 ML/MIN/1.73
GLOBULIN UR ELPH-MCNC: 2.7 GM/DL
GLUCOSE SERPL-MCNC: 93 MG/DL (ref 65–99)
HADV DNA SPEC NAA+PROBE: NOT DETECTED
HCOV 229E RNA SPEC QL NAA+PROBE: NOT DETECTED
HCOV HKU1 RNA SPEC QL NAA+PROBE: NOT DETECTED
HCOV NL63 RNA SPEC QL NAA+PROBE: NOT DETECTED
HCOV OC43 RNA SPEC QL NAA+PROBE: NOT DETECTED
HCT VFR BLD AUTO: 41.9 % (ref 34–46.6)
HGB BLD-MCNC: 14.3 G/DL (ref 12–15.9)
HMPV RNA NPH QL NAA+NON-PROBE: NOT DETECTED
HPIV1 RNA ISLT QL NAA+PROBE: NOT DETECTED
HPIV2 RNA SPEC QL NAA+PROBE: NOT DETECTED
HPIV3 RNA NPH QL NAA+PROBE: NOT DETECTED
HPIV4 P GENE NPH QL NAA+PROBE: NOT DETECTED
LYMPHOCYTES # BLD AUTO: 2.3 10*3/MM3 (ref 0.7–3.1)
LYMPHOCYTES NFR BLD AUTO: 40.4 % (ref 19.6–45.3)
M PNEUMO IGG SER IA-ACNC: NOT DETECTED
MCH RBC QN AUTO: 29.9 PG (ref 26.6–33)
MCHC RBC AUTO-ENTMCNC: 34.1 G/DL (ref 31.5–35.7)
MCV RBC AUTO: 87.5 FL (ref 79–97)
MONOCYTES # BLD AUTO: 0.4 10*3/MM3 (ref 0.1–0.9)
MONOCYTES NFR BLD AUTO: 7.9 % (ref 5–12)
NEUTROPHILS NFR BLD AUTO: 2.7 10*3/MM3 (ref 1.7–7)
NEUTROPHILS NFR BLD AUTO: 47.6 % (ref 42.7–76)
NRBC BLD AUTO-RTO: 0.1 /100 WBC (ref 0–0.2)
NT-PROBNP SERPL-MCNC: 24.3 PG/ML (ref 0–1800)
PLATELET # BLD AUTO: 193 10*3/MM3 (ref 140–450)
PMV BLD AUTO: 7.7 FL (ref 6–12)
POTASSIUM SERPL-SCNC: 3.4 MMOL/L (ref 3.5–5.2)
PROT SERPL-MCNC: 6.6 G/DL (ref 6–8.5)
RBC # BLD AUTO: 4.79 10*6/MM3 (ref 3.77–5.28)
RHINOVIRUS RNA SPEC NAA+PROBE: NOT DETECTED
RSV RNA NPH QL NAA+NON-PROBE: NOT DETECTED
SARS-COV-2 RNA NPH QL NAA+NON-PROBE: NOT DETECTED
SODIUM SERPL-SCNC: 138 MMOL/L (ref 136–145)
TROPONIN T SERPL-MCNC: <0.01 NG/ML (ref 0–0.03)
TSH SERPL DL<=0.05 MIU/L-ACNC: 2.04 UIU/ML (ref 0.27–4.2)
WBC NRBC COR # BLD: 5.7 10*3/MM3 (ref 3.4–10.8)

## 2022-02-27 PROCEDURE — 83880 ASSAY OF NATRIURETIC PEPTIDE: CPT | Performed by: NURSE PRACTITIONER

## 2022-02-27 PROCEDURE — 93005 ELECTROCARDIOGRAM TRACING: CPT | Performed by: NURSE PRACTITIONER

## 2022-02-27 PROCEDURE — 84443 ASSAY THYROID STIM HORMONE: CPT | Performed by: NURSE PRACTITIONER

## 2022-02-27 PROCEDURE — 80053 COMPREHEN METABOLIC PANEL: CPT | Performed by: NURSE PRACTITIONER

## 2022-02-27 PROCEDURE — 85379 FIBRIN DEGRADATION QUANT: CPT | Performed by: NURSE PRACTITIONER

## 2022-02-27 PROCEDURE — 99283 EMERGENCY DEPT VISIT LOW MDM: CPT

## 2022-02-27 PROCEDURE — 84484 ASSAY OF TROPONIN QUANT: CPT | Performed by: NURSE PRACTITIONER

## 2022-02-27 PROCEDURE — 0202U NFCT DS 22 TRGT SARS-COV-2: CPT | Performed by: NURSE PRACTITIONER

## 2022-02-27 PROCEDURE — 84439 ASSAY OF FREE THYROXINE: CPT | Performed by: NURSE PRACTITIONER

## 2022-02-27 PROCEDURE — 85025 COMPLETE CBC W/AUTO DIFF WBC: CPT | Performed by: NURSE PRACTITIONER

## 2022-02-27 PROCEDURE — 71045 X-RAY EXAM CHEST 1 VIEW: CPT

## 2022-02-27 RX ORDER — SODIUM CHLORIDE 0.9 % (FLUSH) 0.9 %
10 SYRINGE (ML) INJECTION AS NEEDED
Status: DISCONTINUED | OUTPATIENT
Start: 2022-02-27 | End: 2022-02-28 | Stop reason: HOSPADM

## 2022-02-28 ENCOUNTER — APPOINTMENT (OUTPATIENT)
Dept: CT IMAGING | Facility: HOSPITAL | Age: 82
End: 2022-02-28

## 2022-02-28 ENCOUNTER — OFFICE VISIT (OUTPATIENT)
Dept: FAMILY MEDICINE CLINIC | Facility: CLINIC | Age: 82
End: 2022-02-28

## 2022-02-28 VITALS
HEIGHT: 61 IN | OXYGEN SATURATION: 93 % | RESPIRATION RATE: 13 BRPM | SYSTOLIC BLOOD PRESSURE: 145 MMHG | TEMPERATURE: 97.7 F | HEART RATE: 61 BPM | DIASTOLIC BLOOD PRESSURE: 76 MMHG | BODY MASS INDEX: 40.78 KG/M2 | WEIGHT: 216 LBS

## 2022-02-28 VITALS
BODY MASS INDEX: 39.95 KG/M2 | HEIGHT: 61 IN | DIASTOLIC BLOOD PRESSURE: 78 MMHG | WEIGHT: 211.6 LBS | TEMPERATURE: 97.6 F | SYSTOLIC BLOOD PRESSURE: 164 MMHG | OXYGEN SATURATION: 97 % | HEART RATE: 67 BPM

## 2022-02-28 DIAGNOSIS — J01.01 ACUTE RECURRENT MAXILLARY SINUSITIS: ICD-10-CM

## 2022-02-28 DIAGNOSIS — E66.01 CLASS 3 SEVERE OBESITY DUE TO EXCESS CALORIES WITH SERIOUS COMORBIDITY AND BODY MASS INDEX (BMI) OF 40.0 TO 44.9 IN ADULT: Primary | ICD-10-CM

## 2022-02-28 DIAGNOSIS — J30.9 ALLERGIC RHINITIS, UNSPECIFIED SEASONALITY, UNSPECIFIED TRIGGER: ICD-10-CM

## 2022-02-28 PROBLEM — E66.813 CLASS 3 SEVERE OBESITY DUE TO EXCESS CALORIES WITH SERIOUS COMORBIDITY AND BODY MASS INDEX (BMI) OF 40.0 TO 44.9 IN ADULT: Status: ACTIVE | Noted: 2022-02-28

## 2022-02-28 LAB — T4 FREE SERPL-MCNC: 1.61 NG/DL (ref 0.93–1.7)

## 2022-02-28 PROCEDURE — 70450 CT HEAD/BRAIN W/O DYE: CPT

## 2022-02-28 PROCEDURE — 99213 OFFICE O/P EST LOW 20 MIN: CPT | Performed by: NURSE PRACTITIONER

## 2022-02-28 RX ORDER — AZITHROMYCIN 250 MG/1
TABLET, FILM COATED ORAL
Qty: 6 TABLET | Refills: 0 | Status: SHIPPED | OUTPATIENT
Start: 2022-02-28 | End: 2022-09-13

## 2022-02-28 RX ORDER — PSEUDOEPHEDRINE HCL 30 MG
30 TABLET ORAL EVERY 4 HOURS PRN
Qty: 60 TABLET | Refills: 1 | Status: SHIPPED | OUTPATIENT
Start: 2022-02-28 | End: 2022-09-13

## 2022-02-28 RX ORDER — POTASSIUM CHLORIDE 750 MG/1
TABLET, FILM COATED, EXTENDED RELEASE ORAL
Qty: 30 TABLET | Refills: 1 | Status: SHIPPED | OUTPATIENT
Start: 2022-02-28

## 2022-02-28 RX ORDER — CETIRIZINE HYDROCHLORIDE 10 MG/1
10 TABLET ORAL DAILY
Qty: 30 TABLET | Refills: 2 | Status: SHIPPED | OUTPATIENT
Start: 2022-02-28

## 2022-02-28 NOTE — PATIENT INSTRUCTIONS
Z-Scar  Zyrtec/Flonase/Sudafed 30  K-Dur 10 mEq twice a week  Recheck blood work in 1 month  Schedule eye exam

## 2022-02-28 NOTE — PROGRESS NOTES
"    Dannielle Wang is a 82 y.o. female.     82-year-old white female with history of CAD, hypertension, hypothyroidism, TIA, arthritis, bunions and hysterectomy comes in today for 6-month follow-up  on ER visits    On February 16 patient went to ER was diagnosed with left otitis media placed on antibiotics.  She was very congested and could not breathe through her nose then returned again 2 weeks later but not placed on any antibiotics.  On examination left ear TM is still slightly red, bilateral turbinates are red with heavy postnasal drip.  I am placing her on a Z-Scar allergy medication told her to let me know if she has improved in 1 to 2 weeks    While in the hospital patient's potassium was low at 3.4 and she does not know where they gave her a replacement or not.  She is on hydrochlorothiazide I am giving her K-Dur 10 milliequivalents twice a week to take and will recheck BMP in a month or 2    Pressure 164/78 heart rate 66 she denies any chest pain, dyspnea, tachycardia or dizziness    Weight is up 3 pounds at 212 with a BMI of 40.  Patient has had 2 COVID vaccines, mammogram is due in March.  Last year we did a Cologuard it was positive and colonoscopy showed a large amount of polyps and she is currently getting yearly colonoscopies.  Patient does need to schedule an eye exam            Z-Scar  Zyrtec/Flonase/Sudafed 30  K-Dur 10 mEq twice a week  Recheck blood work in 1 month  Schedule eye exam           The following portions of the patient's history were reviewed and updated as appropriate: allergies, current medications, past family history, past medical history, past social history, past surgical history and problem list.    Vitals:    02/28/22 1348   BP: 164/78   BP Location: Right arm   Patient Position: Sitting   Cuff Size: Large Adult   Pulse: 67   Temp: 97.6 °F (36.4 °C)   TempSrc: Temporal   SpO2: 97%   Weight: 96 kg (211 lb 9.6 oz)   Height: 154.9 cm (61\")     Body mass index is 39.98 " kg/m².    Past Medical History:   Diagnosis Date   • Coronary artery disease    • Hyperlipidemia    • Hypertension    • Hypothyroidism      Past Surgical History:   Procedure Laterality Date   • JOINT REPLACEMENT     • REPLACEMENT TOTAL KNEE       Family History   Problem Relation Age of Onset   • Hypertension Mother    • Stroke Mother    • Heart disease Father    • No Known Problems Sister    • No Known Problems Brother    • No Known Problems Maternal Aunt    • No Known Problems Maternal Uncle    • No Known Problems Paternal Aunt    • No Known Problems Paternal Uncle    • No Known Problems Maternal Grandmother    • No Known Problems Maternal Grandfather    • No Known Problems Paternal Grandmother    • No Known Problems Paternal Grandfather    • No Known Problems Other    • Anemia Neg Hx    • Arrhythmia Neg Hx    • Asthma Neg Hx    • Clotting disorder Neg Hx    • Fainting Neg Hx    • Heart attack Neg Hx    • Heart failure Neg Hx    • Hyperlipidemia Neg Hx      Immunization History   Administered Date(s) Administered   • COVID-19 (MODERNA) 1st, 2nd, 3rd Dose Only 01/15/2021, 02/12/2021   • Fluad Quad 65+ 12/19/2019, 12/17/2020   • Fluzone High Dose =>65 Years ([employer] ONLY) 09/26/2016, 10/16/2018   • PPD Test 06/02/2004   • Pneumococcal Polysaccharide (PPSV23) 12/17/2020       Admission on 02/27/2022, Discharged on 02/28/2022   Component Date Value Ref Range Status   • Glucose 02/27/2022 93  65 - 99 mg/dL Final   • BUN 02/27/2022 12  8 - 23 mg/dL Final   • Creatinine 02/27/2022 1.10* 0.57 - 1.00 mg/dL Final   • Sodium 02/27/2022 138  136 - 145 mmol/L Final   • Potassium 02/27/2022 3.4* 3.5 - 5.2 mmol/L Final   • Chloride 02/27/2022 101  98 - 107 mmol/L Final   • CO2 02/27/2022 23.0  22.0 - 29.0 mmol/L Final   • Calcium 02/27/2022 9.4  8.6 - 10.5 mg/dL Final   • Total Protein 02/27/2022 6.6  6.0 - 8.5 g/dL Final   • Albumin 02/27/2022 3.90  3.50 - 5.20 g/dL Final   • ALT (SGPT) 02/27/2022 13  1 - 33 U/L Final   • AST  (SGOT) 02/27/2022 18  1 - 32 U/L Final   • Alkaline Phosphatase 02/27/2022 48  39 - 117 U/L Final   • Total Bilirubin 02/27/2022 0.5  0.0 - 1.2 mg/dL Final   • eGFR Non  Amer 02/27/2022 47* >60 mL/min/1.73 Final   • Globulin 02/27/2022 2.7  gm/dL Final   • A/G Ratio 02/27/2022 1.4  g/dL Final   • BUN/Creatinine Ratio 02/27/2022 10.9  7.0 - 25.0 Final   • Anion Gap 02/27/2022 14.0  5.0 - 15.0 mmol/L Final   • proBNP 02/27/2022 24.3  0.0-1,800.0 pg/mL Final   • Troponin T 02/27/2022 <0.010  0.000 - 0.030 ng/mL Final   • D-Dimer, Quantitative 02/27/2022 0.55  0.00 - 0.59 mg/L (FEU) Final   • TSH 02/27/2022 2.040  0.270 - 4.200 uIU/mL Final   • Free T4 02/27/2022 1.61  0.93 - 1.70 ng/dL Final   • QT Interval 02/27/2022 452  ms Preliminary   • WBC 02/27/2022 5.70  3.40 - 10.80 10*3/mm3 Final   • RBC 02/27/2022 4.79  3.77 - 5.28 10*6/mm3 Final   • Hemoglobin 02/27/2022 14.3  12.0 - 15.9 g/dL Final   • Hematocrit 02/27/2022 41.9  34.0 - 46.6 % Final   • MCV 02/27/2022 87.5  79.0 - 97.0 fL Final   • MCH 02/27/2022 29.9  26.6 - 33.0 pg Final   • MCHC 02/27/2022 34.1  31.5 - 35.7 g/dL Final   • RDW 02/27/2022 14.9  12.3 - 15.4 % Final   • RDW-SD 02/27/2022 45.9  37.0 - 54.0 fl Final   • MPV 02/27/2022 7.7  6.0 - 12.0 fL Final   • Platelets 02/27/2022 193  140 - 450 10*3/mm3 Final   • Neutrophil % 02/27/2022 47.6  42.7 - 76.0 % Final   • Lymphocyte % 02/27/2022 40.4  19.6 - 45.3 % Final   • Monocyte % 02/27/2022 7.9  5.0 - 12.0 % Final   • Eosinophil % 02/27/2022 2.7  0.3 - 6.2 % Final   • Basophil % 02/27/2022 1.4  0.0 - 1.5 % Final   • Neutrophils, Absolute 02/27/2022 2.70  1.70 - 7.00 10*3/mm3 Final   • Lymphocytes, Absolute 02/27/2022 2.30  0.70 - 3.10 10*3/mm3 Final   • Monocytes, Absolute 02/27/2022 0.40  0.10 - 0.90 10*3/mm3 Final   • Eosinophils, Absolute 02/27/2022 0.20  0.00 - 0.40 10*3/mm3 Final   • Basophils, Absolute 02/27/2022 0.10  0.00 - 0.20 10*3/mm3 Final   • nRBC 02/27/2022 0.1  0.0 - 0.2 /100 WBC  Final   • ADENOVIRUS, PCR 02/27/2022 Not Detected  Not Detected Final   • Coronavirus 229E 02/27/2022 Not Detected  Not Detected Final   • Coronavirus HKU1 02/27/2022 Not Detected  Not Detected Final   • Coronavirus NL63 02/27/2022 Not Detected  Not Detected Final   • Coronavirus OC43 02/27/2022 Not Detected  Not Detected Final   • COVID19 02/27/2022 Not Detected  Not Detected - Ref. Range Final   • Human Metapneumovirus 02/27/2022 Not Detected  Not Detected Final   • Human Rhinovirus/Enterovirus 02/27/2022 Not Detected  Not Detected Final   • Influenza A PCR 02/27/2022 Not Detected  Not Detected Final   • Influenza B PCR 02/27/2022 Not Detected  Not Detected Final   • Parainfluenza Virus 1 02/27/2022 Not Detected  Not Detected Final   • Parainfluenza Virus 2 02/27/2022 Not Detected  Not Detected Final   • Parainfluenza Virus 3 02/27/2022 Not Detected  Not Detected Final   • Parainfluenza Virus 4 02/27/2022 Not Detected  Not Detected Final   • RSV, PCR 02/27/2022 Not Detected  Not Detected Final   • Bordetella pertussis pcr 02/27/2022 Not Detected  Not Detected Final   • Bordetella parapertussis PCR 02/27/2022 Not Detected  Not Detected Final   • Chlamydophila pneumoniae PCR 02/27/2022 Not Detected  Not Detected Final   • Mycoplasma pneumo by PCR 02/27/2022 Not Detected  Not Detected Final         Review of Systems   Constitutional: Negative.    HENT: Positive for congestion and postnasal drip.    Respiratory: Positive for cough.    Cardiovascular: Negative.    Gastrointestinal: Negative.    Genitourinary: Negative.    Musculoskeletal: Negative.    Skin: Negative.    Neurological: Negative.    Psychiatric/Behavioral: Negative.        Objective   Physical Exam  Constitutional:       Appearance: Normal appearance.   HENT:      Head: Normocephalic.      Ears:      Comments: Right ear with bulging TM clear fluid, left ear bulging TM with redness     Nose:      Comments: Bilateral red turbinates     Mouth/Throat:       Comments: Heavy postnasal drip  Cardiovascular:      Rate and Rhythm: Normal rate and regular rhythm.      Pulses: Normal pulses.      Heart sounds: Normal heart sounds.   Pulmonary:      Effort: Pulmonary effort is normal.   Abdominal:      General: Bowel sounds are normal.   Musculoskeletal:         General: Normal range of motion.   Skin:     General: Skin is warm and dry.   Neurological:      General: No focal deficit present.      Mental Status: She is alert and oriented to person, place, and time.   Psychiatric:         Mood and Affect: Mood normal.         Procedures    Assessment/Plan   Diagnoses and all orders for this visit:    1. Class 3 severe obesity due to excess calories with serious comorbidity and body mass index (BMI) of 40.0 to 44.9 in adult ([facility]) (Primary)    2. Acute recurrent maxillary sinusitis    3. Allergic rhinitis, unspecified seasonality, unspecified trigger    Other orders  -     pseudoephedrine (Sudafed) 30 MG tablet; Take 1 tablet by mouth Every 4 (Four) Hours As Needed for Congestion.  Dispense: 60 tablet; Refill: 1  -     cetirizine (ZyrTEC Allergy) 10 MG tablet; Take 1 tablet by mouth Daily.  Dispense: 30 tablet; Refill: 2  -     azithromycin (Zithromax Z-Scar) 250 MG tablet; Take 2 tablets the first day, then 1 tablet daily for 4 days.  Dispense: 6 tablet; Refill: 0  -     potassium chloride 10 MEQ CR tablet; Take one tablet on Tuesday and saturday  Dispense: 30 tablet; Refill: 01          Current Outpatient Medications:   •  acetaminophen (TYLENOL) 325 MG tablet, Take 325 mg by mouth Every 6 (Six) Hours As Needed., Disp: , Rfl:   •  Ascorbic Acid (VITAMIN C) 500 MG capsule, Take 500 mg by mouth Daily., Disp: , Rfl:   •  calcium carbonate-vitamin d 600-400 MG-UNIT per tablet, Take 600 mg by mouth 2 (Two) Times a Day., Disp: , Rfl:   •  fluticasone (FLONASE) 50 MCG/ACT nasal spray, 2 sprays into the nostril(s) as directed by provider Daily., Disp: 9.9 mL, Rfl: 0  •   hydroxychloroquine (PLAQUENIL) 200 MG tablet, TAKE 1 TABLET BY MOUTH TWICE DAILY, Disp: 180 tablet, Rfl: 1  •  levothyroxine (SYNTHROID, LEVOTHROID) 50 MCG tablet, TAKE 1 TABLET BY MOUTH DAILY, Disp: 90 tablet, Rfl: 1  •  lisinopril-hydrochlorothiazide (PRINZIDE,ZESTORETIC) 10-12.5 MG per tablet, TAKE 1 TABLET BY MOUTH EVERY DAY, Disp: 90 tablet, Rfl: 3  •  Multiple Vitamin (MULTIVITAMIN) capsule, Take 1 capsule by mouth Daily., Disp: , Rfl:   •  rosuvastatin (CRESTOR) 10 MG tablet, TAKE 1 TABLET BY MOUTH EVERY NIGHT AT BEDTIME, Disp: 90 tablet, Rfl: 1  •  azithromycin (Zithromax Z-Scar) 250 MG tablet, Take 2 tablets the first day, then 1 tablet daily for 4 days., Disp: 6 tablet, Rfl: 0  •  cetirizine (ZyrTEC Allergy) 10 MG tablet, Take 1 tablet by mouth Daily., Disp: 30 tablet, Rfl: 2  •  potassium chloride 10 MEQ CR tablet, Take one tablet on Tuesday and saturday, Disp: 30 tablet, Rfl: 01  •  pseudoephedrine (Sudafed) 30 MG tablet, Take 1 tablet by mouth Every 4 (Four) Hours As Needed for Congestion., Disp: 60 tablet, Rfl: 1  No current facility-administered medications for this visit.           Sylvia Almeida, APRN2/28/202215:21 EST  This note has been electronically signed

## 2022-03-01 ENCOUNTER — PATIENT OUTREACH (OUTPATIENT)
Dept: CASE MANAGEMENT | Facility: OTHER | Age: 82
End: 2022-03-01

## 2022-03-01 ENCOUNTER — OFFICE VISIT (OUTPATIENT)
Dept: CARDIOLOGY | Facility: CLINIC | Age: 82
End: 2022-03-01

## 2022-03-01 VITALS
BODY MASS INDEX: 39.84 KG/M2 | DIASTOLIC BLOOD PRESSURE: 78 MMHG | OXYGEN SATURATION: 97 % | HEIGHT: 61 IN | SYSTOLIC BLOOD PRESSURE: 137 MMHG | WEIGHT: 211 LBS | HEART RATE: 70 BPM

## 2022-03-01 DIAGNOSIS — I10 ESSENTIAL HYPERTENSION: ICD-10-CM

## 2022-03-01 DIAGNOSIS — I25.10 CORONARY ARTERY DISEASE INVOLVING NATIVE CORONARY ARTERY OF NATIVE HEART WITHOUT ANGINA PECTORIS: Primary | ICD-10-CM

## 2022-03-01 DIAGNOSIS — E78.00 PURE HYPERCHOLESTEROLEMIA: ICD-10-CM

## 2022-03-01 PROCEDURE — 99213 OFFICE O/P EST LOW 20 MIN: CPT | Performed by: INTERNAL MEDICINE

## 2022-03-01 NOTE — OUTREACH NOTE
Ambulatory Case Management Note    Patient Outreach  Patient in ED 2/27/22 again for DX multiple complaints & atypical chest pain.  PCP HFU completed 2/28/22.  RN-MIKE initial outreach completed today w/ Grand dtr Kristin per ILAN b/c patient H# NA/VM full.       Kristin states patient did  &start RXs ordered by PCP.  States patient has ride to Dr Ruiz applandy today.    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 States anxiety re being alone has preceded other ER visits.      Discussed how to prevent patient being alone overnight, regular PCP/specialty f/up appts instead of ED. Kristin states she has also discouraged pt from using ED visits for primary care, but states she will take patient if she wants.  She wonders if anti-anxiety med might help, but is unsure if patient would agree to that. States she thinks patient is very private and also doesn't want to bother her family re transport.      Discussed transport issues. States she can drive patient if scheduled in advance, or Will can drive.  VU re [facility] transport, took phone#.  Also given RN-ACM contact info and phone # for Daiana Song [phone].   She verb appreciation for the call.    Care Coordination  Medina Nohemi ([phone]) patient's Daiana MARX, states patient qualifies for [facility] transport [phone] - but patient must schedule 2-3 days in advance.    Patient Outreach  NA/voicemail full at patient home ph#.    Shonda Yap RN  Ambulatory Case Management  3/1/2022, 10:17 EST

## 2022-03-01 NOTE — PROGRESS NOTES
"    Subjective:     Encounter Date:03/01/2022      Patient ID: Dannielle Wang is a 82 y.o. female.    Chief Complaint:  History of Present Illness 82-year-old white female with history of coronary disease hypertension hyperlipidemia presents to my office for follow-up.  Patient is currently stable without any signs of chest pain but has shortness of breath with exertion.  No complains any PND orthopnea.  No palpitation dizziness syncope or swelling of the feet.  Patient has been taking all the medicines regularly.  Patient does not smoke.  She exercises regularly.  She follows a good diet.    The following portions of the patient's history were reviewed and updated as appropriate: allergies, current medications, past family history, past medical history, past social history, past surgical history and problem list.  Past Medical History:   Diagnosis Date   • Coronary artery disease    • Hyperlipidemia    • Hypertension    • Hypothyroidism      Past Surgical History:   Procedure Laterality Date   • JOINT REPLACEMENT     • REPLACEMENT TOTAL KNEE       /78 (BP Location: Left arm, Patient Position: Sitting, Cuff Size: Adult)   Pulse 70   Ht 154.9 cm (61\")   Wt 95.7 kg (211 lb)   LMP  (LMP Unknown)   SpO2 97%   BMI 39.87 kg/m²   Family History   Problem Relation Age of Onset   • Hypertension Mother    • Stroke Mother    • Heart disease Father    • No Known Problems Sister    • No Known Problems Brother    • No Known Problems Maternal Aunt    • No Known Problems Maternal Uncle    • No Known Problems Paternal Aunt    • No Known Problems Paternal Uncle    • No Known Problems Maternal Grandmother    • No Known Problems Maternal Grandfather    • No Known Problems Paternal Grandmother    • No Known Problems Paternal Grandfather    • No Known Problems Other    • Anemia Neg Hx    • Arrhythmia Neg Hx    • Asthma Neg Hx    • Clotting disorder Neg Hx    • Fainting Neg Hx    • Heart attack Neg Hx    • Heart failure Neg " Hx    • Hyperlipidemia Neg Hx        Current Outpatient Medications:   •  acetaminophen (TYLENOL) 325 MG tablet, Take 325 mg by mouth Every 6 (Six) Hours As Needed., Disp: , Rfl:   •  Ascorbic Acid (VITAMIN C) 500 MG capsule, Take 500 mg by mouth Daily., Disp: , Rfl:   •  azithromycin (Zithromax Z-Scar) 250 MG tablet, Take 2 tablets the first day, then 1 tablet daily for 4 days., Disp: 6 tablet, Rfl: 0  •  calcium carbonate-vitamin d 600-400 MG-UNIT per tablet, Take 600 mg by mouth 2 (Two) Times a Day., Disp: , Rfl:   •  cetirizine (ZyrTEC Allergy) 10 MG tablet, Take 1 tablet by mouth Daily., Disp: 30 tablet, Rfl: 2  •  fluticasone (FLONASE) 50 MCG/ACT nasal spray, 2 sprays into the nostril(s) as directed by provider Daily., Disp: 9.9 mL, Rfl: 0  •  levothyroxine (SYNTHROID, LEVOTHROID) 50 MCG tablet, TAKE 1 TABLET BY MOUTH DAILY, Disp: 90 tablet, Rfl: 1  •  lisinopril-hydrochlorothiazide (PRINZIDE,ZESTORETIC) 10-12.5 MG per tablet, TAKE 1 TABLET BY MOUTH EVERY DAY, Disp: 90 tablet, Rfl: 3  •  Multiple Vitamin (MULTIVITAMIN) capsule, Take 1 capsule by mouth Daily., Disp: , Rfl:   •  potassium chloride 10 MEQ CR tablet, Take one tablet on Tuesday and saturday, Disp: 30 tablet, Rfl: 01  •  pseudoephedrine (Sudafed) 30 MG tablet, Take 1 tablet by mouth Every 4 (Four) Hours As Needed for Congestion., Disp: 60 tablet, Rfl: 1  •  rosuvastatin (CRESTOR) 10 MG tablet, TAKE 1 TABLET BY MOUTH EVERY NIGHT AT BEDTIME, Disp: 90 tablet, Rfl: 1  •  hydroxychloroquine (PLAQUENIL) 200 MG tablet, TAKE 1 TABLET BY MOUTH TWICE DAILY, Disp: 180 tablet, Rfl: 1  Allergies   Allergen Reactions   • Atorvastatin Calcium Myalgia   • Cephalexin Dizziness     Social History     Socioeconomic History   • Marital status:    Tobacco Use   • Smoking status: Never Smoker   • Smokeless tobacco: Never Used   Substance and Sexual Activity   • Alcohol use: No   • Drug use: No   • Sexual activity: Never     Review of Systems   Constitutional:  Negative for fever and malaise/fatigue.   Cardiovascular: Negative for chest pain, dyspnea on exertion and palpitations.   Respiratory: Positive for shortness of breath. Negative for cough.    Skin: Negative for rash.   Gastrointestinal: Negative for abdominal pain, nausea and vomiting.   Neurological: Negative for focal weakness and headaches.   All other systems reviewed and are negative.             Objective:     Constitutional:       Appearance: Well-developed.   Eyes:      General: No scleral icterus.     Conjunctiva/sclera: Conjunctivae normal.   HENT:      Head: Normocephalic and atraumatic.   Neck:      Vascular: No carotid bruit or JVD.   Pulmonary:      Effort: Pulmonary effort is normal.      Breath sounds: Normal breath sounds. No wheezing. No rales.   Cardiovascular:      Normal rate. Regular rhythm.   Pulses:     Intact distal pulses.   Abdominal:      General: Bowel sounds are normal.      Palpations: Abdomen is soft.   Musculoskeletal:      Cervical back: Normal range of motion and neck supple. Skin:     General: Skin is warm and dry.      Findings: No rash.   Neurological:      Mental Status: Alert.       Procedures    Lab Review:         MDM  1.  Coronary disease  Patient has nonobstructive disease now with normally systolic function is currently stable on medications  2.  Hypertension  Patient's blood pressure currently stable on medications  3.  Hyperlipidemia  Patient is on statins and the lipid levels are well within normal limits.      Patient's previous medical records, labs, and EKG were reviewed and discussed with the patient at today's visit.

## 2022-03-03 ENCOUNTER — PATIENT OUTREACH (OUTPATIENT)
Dept: CASE MANAGEMENT | Facility: OTHER | Age: 82
End: 2022-03-03

## 2022-03-03 LAB — QT INTERVAL: 452 MS

## 2022-03-03 NOTE — OUTREACH NOTE
Ambulatory Case Management Note    Care Coordination  [facility] requesting 6/23/21 eye exam records be faxed to PCP office.  Care Coordination  [facility] scheduling states patient has GI f/up appt w/ Alberto Ward on 4/14/22 @ 10:30 AM    Shonda Yap RN  Ambulatory Case Management  3/3/2022, 14:24 EST

## 2022-03-06 RX ORDER — HYDROXYCHLOROQUINE SULFATE 200 MG/1
TABLET, FILM COATED ORAL
Qty: 180 TABLET | Refills: 1 | Status: SHIPPED | OUTPATIENT
Start: 2022-03-06 | End: 2022-09-08 | Stop reason: SDUPTHER

## 2022-03-06 RX ORDER — LEVOTHYROXINE SODIUM 0.05 MG/1
50 TABLET ORAL DAILY
Qty: 90 TABLET | Refills: 1 | Status: SHIPPED | OUTPATIENT
Start: 2022-03-06 | End: 2022-09-08 | Stop reason: SDUPTHER

## 2022-03-06 RX ORDER — ROSUVASTATIN CALCIUM 10 MG/1
10 TABLET, COATED ORAL
Qty: 90 TABLET | Refills: 1 | Status: SHIPPED | OUTPATIENT
Start: 2022-03-06 | End: 2022-09-08 | Stop reason: SDUPTHER

## 2022-03-15 ENCOUNTER — PATIENT OUTREACH (OUTPATIENT)
Dept: CASE MANAGEMENT | Facility: OTHER | Age: 82
End: 2022-03-15

## 2022-03-15 NOTE — OUTREACH NOTE
AMBULATORY CASE MANAGEMENT NOTE    Name and Relationship of Patient/Support Person: Dannielle Wang G - Self   Patient Outreach  Multiple RN-ACM outreach attempts to Ms Wang unsuccessful - LVMs x4.    2/27/22 ED visit f/up was completed on 3/1/22 with patient ER contact & grand daughter Kristin (an RN). RN-CYNDIM was also UTR patient at that time.   Kristin confirmed on 3/1/22 that she has PCP ph#,  RN-ACM ph#, Daiana  ph# and is aware of 3/22 PCP appt & 4/14 GI appt @ [facility].   Patient has been enrolled in HRCM program 9 months, with 3 ED visits in last 6 mos. At last outreach Kristin stated that she thinks patient might receive more thorough treatment follow-up if she sought non-emergent care at her PCP instead of at ED, but states she will continue to take patient to ED if patient wishes. Next PCP appt already scheduled 3/22/22.   Care gaps previously addressed with patient, AWV completed, benefits of Mychart discussed.  ACP discussed with patient and she confirmed she received advanced care planning materials and plans to discuss w/ family.  No additional HRCM outreach attempts scheduled at this time. RN-CYNDIM to outreach in future as needed.   CHERI SAMANIEGO  Ambulatory Case Management  3/15/2022, 16:25 EDT

## 2022-09-08 RX ORDER — ROSUVASTATIN CALCIUM 10 MG/1
TABLET, COATED ORAL
Qty: 90 TABLET | Refills: 0 | OUTPATIENT
Start: 2022-09-08

## 2022-09-08 RX ORDER — LEVOTHYROXINE SODIUM 0.05 MG/1
50 TABLET ORAL DAILY
Qty: 90 TABLET | Refills: 1 | Status: SHIPPED | OUTPATIENT
Start: 2022-09-08 | End: 2023-03-07

## 2022-09-08 RX ORDER — LEVOTHYROXINE SODIUM 50 UG/1
TABLET ORAL
Qty: 90 TABLET | Refills: 0 | OUTPATIENT
Start: 2022-09-08

## 2022-09-08 RX ORDER — ROSUVASTATIN CALCIUM 10 MG/1
10 TABLET, COATED ORAL
Qty: 90 TABLET | Refills: 1 | Status: SHIPPED | OUTPATIENT
Start: 2022-09-08 | End: 2023-03-07

## 2022-09-08 RX ORDER — HYDROXYCHLOROQUINE SULFATE 200 MG/1
TABLET, FILM COATED ORAL
Qty: 180 TABLET | Refills: 0 | OUTPATIENT
Start: 2022-09-08 | End: 2022-12-07

## 2022-09-08 RX ORDER — HYDROXYCHLOROQUINE SULFATE 200 MG/1
200 TABLET, FILM COATED ORAL 2 TIMES DAILY
Qty: 180 TABLET | Refills: 1 | Status: SHIPPED | OUTPATIENT
Start: 2022-09-08 | End: 2023-03-07

## 2022-09-08 NOTE — TELEPHONE ENCOUNTER
PATIENT IS CALLING IN SHE IS OUT OF THESE MEDICATIONS, SHE DID SCHEDULE FIRST AVAILABLE APPT FOR NEXT Tuesday AND IS ASKING TO GET ENOUGH TO GET HER TO APPT.      PLEASE ADVISE    CALLBACK NUMBER IS  [phone]      [facility]  [address], [place] - [address] [facility] [phone] [facility] [phone] FX

## 2022-09-13 ENCOUNTER — OFFICE VISIT (OUTPATIENT)
Dept: FAMILY MEDICINE CLINIC | Facility: CLINIC | Age: 82
End: 2022-09-13

## 2022-09-13 VITALS
SYSTOLIC BLOOD PRESSURE: 172 MMHG | BODY MASS INDEX: 41.08 KG/M2 | HEART RATE: 67 BPM | HEIGHT: 61 IN | WEIGHT: 217.6 LBS | DIASTOLIC BLOOD PRESSURE: 83 MMHG | TEMPERATURE: 97.1 F | OXYGEN SATURATION: 95 %

## 2022-09-13 DIAGNOSIS — Z23 NEED FOR PNEUMOCOCCAL VACCINATION: ICD-10-CM

## 2022-09-13 DIAGNOSIS — E03.9 ACQUIRED HYPOTHYROIDISM: ICD-10-CM

## 2022-09-13 DIAGNOSIS — E78.2 MIXED HYPERLIPIDEMIA: Primary | ICD-10-CM

## 2022-09-13 DIAGNOSIS — E66.01 CLASS 3 SEVERE OBESITY DUE TO EXCESS CALORIES WITH SERIOUS COMORBIDITY AND BODY MASS INDEX (BMI) OF 40.0 TO 44.9 IN ADULT: ICD-10-CM

## 2022-09-13 DIAGNOSIS — Z00.00 PREVENTATIVE HEALTH CARE: ICD-10-CM

## 2022-09-13 DIAGNOSIS — Z23 NEED FOR INFLUENZA VACCINATION: ICD-10-CM

## 2022-09-13 PROCEDURE — G0009 ADMIN PNEUMOCOCCAL VACCINE: HCPCS | Performed by: NURSE PRACTITIONER

## 2022-09-13 PROCEDURE — 90677 PCV20 VACCINE IM: CPT | Performed by: NURSE PRACTITIONER

## 2022-09-13 PROCEDURE — 90662 IIV NO PRSV INCREASED AG IM: CPT | Performed by: NURSE PRACTITIONER

## 2022-09-13 PROCEDURE — 1170F FXNL STATUS ASSESSED: CPT | Performed by: NURSE PRACTITIONER

## 2022-09-13 PROCEDURE — 1159F MED LIST DOCD IN RCRD: CPT | Performed by: NURSE PRACTITIONER

## 2022-09-13 PROCEDURE — G0439 PPPS, SUBSEQ VISIT: HCPCS | Performed by: NURSE PRACTITIONER

## 2022-09-13 PROCEDURE — G0008 ADMIN INFLUENZA VIRUS VAC: HCPCS | Performed by: NURSE PRACTITIONER

## 2022-09-13 NOTE — PATIENT INSTRUCTIONS
Influenza/pneumonia vaccine today  Schedule colonoscopy  Fasting blood work  Follow-up 6 months

## 2022-09-13 NOTE — PROGRESS NOTES
"    Dannielle Wang is a 82 y.o. female.     82-year-old white female with history of CAD, hypertension, hypothyroidism, TIA, arthritis, bunions and hysterectomy comes in today for Medicare wellness visit    Blood pressure 172/82 heart rate 66 she denies any chest pain, dyspnea, tachycardia or dizziness    Weight is 218 with a BMI of 41.1 she is up-to-date on COVID vaccines influenza and pneumonia and will be doing pneumonia booster today    She longer does mammograms her DEXA scan is due in 2023 and she was post to schedule colonoscopy this year but has not done so yet.  She is up-to-date on eye exam              Influenza/pneumonia vaccine today  Schedule colonoscopy  Fasting blood work  Follow-up 6 months         The following portions of the patient's history were reviewed and updated as appropriate: allergies, current medications, past family history, past medical history, past social history, past surgical history and problem list.    Vitals:    09/13/22 1419   BP: 172/83   BP Location: Right arm   Patient Position: Sitting   Cuff Size: Adult   Pulse: 67   Temp: 97.1 °F (36.2 °C)   TempSrc: Temporal   SpO2: 95%   Weight: 98.7 kg (217 lb 9.6 oz)   Height: 154.9 cm (61\")     Body mass index is 41.12 kg/m².    Past Medical History:   Diagnosis Date   • Coronary artery disease    • Hyperlipidemia    • Hypertension    • Hypothyroidism      Past Surgical History:   Procedure Laterality Date   • JOINT REPLACEMENT     • REPLACEMENT TOTAL KNEE       Family History   Problem Relation Age of Onset   • Hypertension Mother    • Stroke Mother    • Heart disease Father    • No Known Problems Sister    • No Known Problems Brother    • No Known Problems Maternal Aunt    • No Known Problems Maternal Uncle    • No Known Problems Paternal Aunt    • No Known Problems Paternal Uncle    • No Known Problems Maternal Grandmother    • No Known Problems Maternal Grandfather    • No Known Problems Paternal Grandmother    • No Known " Problems Paternal Grandfather    • No Known Problems Other    • Anemia Neg Hx    • Arrhythmia Neg Hx    • Asthma Neg Hx    • Clotting disorder Neg Hx    • Fainting Neg Hx    • Heart attack Neg Hx    • Heart failure Neg Hx    • Hyperlipidemia Neg Hx      Immunization History   Administered Date(s) Administered   • COVID-19 (MODERNA) 1st, 2nd, 3rd Dose Only 01/15/2021, 02/12/2021   • Fluad Quad 65+ 12/19/2019, 12/17/2020   • Fluzone High Dose =>65 Years ([employer] ONLY) 09/26/2016, 10/16/2018   • PPD Test 06/02/2004   • Pneumococcal Polysaccharide (PPSV23) 12/17/2020       Admission on 02/27/2022, Discharged on 02/28/2022   Component Date Value Ref Range Status   • Glucose 02/27/2022 93  65 - 99 mg/dL Final   • BUN 02/27/2022 12  8 - 23 mg/dL Final   • Creatinine 02/27/2022 1.10 (A) 0.57 - 1.00 mg/dL Final   • Sodium 02/27/2022 138  136 - 145 mmol/L Final   • Potassium 02/27/2022 3.4 (A) 3.5 - 5.2 mmol/L Final   • Chloride 02/27/2022 101  98 - 107 mmol/L Final   • CO2 02/27/2022 23.0  22.0 - 29.0 mmol/L Final   • Calcium 02/27/2022 9.4  8.6 - 10.5 mg/dL Final   • Total Protein 02/27/2022 6.6  6.0 - 8.5 g/dL Final   • Albumin 02/27/2022 3.90  3.50 - 5.20 g/dL Final   • ALT (SGPT) 02/27/2022 13  1 - 33 U/L Final   • AST (SGOT) 02/27/2022 18  1 - 32 U/L Final   • Alkaline Phosphatase 02/27/2022 48  39 - 117 U/L Final   • Total Bilirubin 02/27/2022 0.5  0.0 - 1.2 mg/dL Final   • eGFR Non  Amer 02/27/2022 47 (A) >60 mL/min/1.73 Final   • Globulin 02/27/2022 2.7  gm/dL Final   • A/G Ratio 02/27/2022 1.4  g/dL Final   • BUN/Creatinine Ratio 02/27/2022 10.9  7.0 - 25.0 Final   • Anion Gap 02/27/2022 14.0  5.0 - 15.0 mmol/L Final   • proBNP 02/27/2022 24.3  0.0-1,800.0 pg/mL Final   • Troponin T 02/27/2022 <0.010  0.000 - 0.030 ng/mL Final   • D-Dimer, Quantitative 02/27/2022 0.55  0.00 - 0.59 mg/L (FEU) Final   • TSH 02/27/2022 2.040  0.270 - 4.200 uIU/mL Final   • Free T4 02/27/2022 1.61  0.93 - 1.70 ng/dL Final   • QT  Interval 02/27/2022 452  ms Final   • WBC 02/27/2022 5.70  3.40 - 10.80 10*3/mm3 Final   • RBC 02/27/2022 4.79  3.77 - 5.28 10*6/mm3 Final   • Hemoglobin 02/27/2022 14.3  12.0 - 15.9 g/dL Final   • Hematocrit 02/27/2022 41.9  34.0 - 46.6 % Final   • MCV 02/27/2022 87.5  79.0 - 97.0 fL Final   • MCH 02/27/2022 29.9  26.6 - 33.0 pg Final   • MCHC 02/27/2022 34.1  31.5 - 35.7 g/dL Final   • RDW 02/27/2022 14.9  12.3 - 15.4 % Final   • RDW-SD 02/27/2022 45.9  37.0 - 54.0 fl Final   • MPV 02/27/2022 7.7  6.0 - 12.0 fL Final   • Platelets 02/27/2022 193  140 - 450 10*3/mm3 Final   • Neutrophil % 02/27/2022 47.6  42.7 - 76.0 % Final   • Lymphocyte % 02/27/2022 40.4  19.6 - 45.3 % Final   • Monocyte % 02/27/2022 7.9  5.0 - 12.0 % Final   • Eosinophil % 02/27/2022 2.7  0.3 - 6.2 % Final   • Basophil % 02/27/2022 1.4  0.0 - 1.5 % Final   • Neutrophils, Absolute 02/27/2022 2.70  1.70 - 7.00 10*3/mm3 Final   • Lymphocytes, Absolute 02/27/2022 2.30  0.70 - 3.10 10*3/mm3 Final   • Monocytes, Absolute 02/27/2022 0.40  0.10 - 0.90 10*3/mm3 Final   • Eosinophils, Absolute 02/27/2022 0.20  0.00 - 0.40 10*3/mm3 Final   • Basophils, Absolute 02/27/2022 0.10  0.00 - 0.20 10*3/mm3 Final   • nRBC 02/27/2022 0.1  0.0 - 0.2 /100 WBC Final   • ADENOVIRUS, PCR 02/27/2022 Not Detected  Not Detected Final   • Coronavirus 229E 02/27/2022 Not Detected  Not Detected Final   • Coronavirus HKU1 02/27/2022 Not Detected  Not Detected Final   • Coronavirus NL63 02/27/2022 Not Detected  Not Detected Final   • Coronavirus OC43 02/27/2022 Not Detected  Not Detected Final   • COVID19 02/27/2022 Not Detected  Not Detected - Ref. Range Final   • Human Metapneumovirus 02/27/2022 Not Detected  Not Detected Final   • Human Rhinovirus/Enterovirus 02/27/2022 Not Detected  Not Detected Final   • Influenza A PCR 02/27/2022 Not Detected  Not Detected Final   • Influenza B PCR 02/27/2022 Not Detected  Not Detected Final   • Parainfluenza Virus 1 02/27/2022 Not  Detected  Not Detected Final   • Parainfluenza Virus 2 02/27/2022 Not Detected  Not Detected Final   • Parainfluenza Virus 3 02/27/2022 Not Detected  Not Detected Final   • Parainfluenza Virus 4 02/27/2022 Not Detected  Not Detected Final   • RSV, PCR 02/27/2022 Not Detected  Not Detected Final   • Bordetella pertussis pcr 02/27/2022 Not Detected  Not Detected Final   • Bordetella parapertussis PCR 02/27/2022 Not Detected  Not Detected Final   • Chlamydophila pneumoniae PCR 02/27/2022 Not Detected  Not Detected Final   • Mycoplasma pneumo by PCR 02/27/2022 Not Detected  Not Detected Final         Review of Systems   Constitutional: Negative.    HENT: Negative.    Respiratory: Negative.    Cardiovascular: Negative.    Gastrointestinal: Negative.    Genitourinary: Negative.    Musculoskeletal: Negative.    Skin: Negative.    Neurological: Negative.    Psychiatric/Behavioral: Negative.        Objective   Physical Exam  Constitutional:       Appearance: Normal appearance.   HENT:      Head: Normocephalic.   Cardiovascular:      Rate and Rhythm: Normal rate.      Pulses: Normal pulses.   Pulmonary:      Effort: Pulmonary effort is normal.   Abdominal:      General: Bowel sounds are normal.   Musculoskeletal:         General: Normal range of motion.   Skin:     General: Skin is warm.   Neurological:      General: No focal deficit present.      Mental Status: She is alert and oriented to person, place, and time.   Psychiatric:         Mood and Affect: Mood normal.         Procedures    Assessment & Plan   Diagnoses and all orders for this visit:    1. Mixed hyperlipidemia (Primary)  -     Lipid Panel With LDL / HDL Ratio    2. Acquired hypothyroidism  -     TSH+Free T4  -     T3    3. Preventative health care  -     CBC & Differential  -     Comprehensive Metabolic Panel  -     Hepatitis C antibody    4. Class 3 severe obesity due to excess calories with serious comorbidity and body mass index (BMI) of 40.0 to 44.9 in  adult (HCC)          Current Outpatient Medications:   •  acetaminophen (TYLENOL) 325 MG tablet, Take 325 mg by mouth Every 6 (Six) Hours As Needed., Disp: , Rfl:   •  Ascorbic Acid (VITAMIN C) 500 MG capsule, Take 500 mg by mouth Daily., Disp: , Rfl:   •  calcium carbonate-vitamin d 600-400 MG-UNIT per tablet, Take 600 mg by mouth 2 (Two) Times a Day., Disp: , Rfl:   •  cetirizine (ZyrTEC Allergy) 10 MG tablet, Take 1 tablet by mouth Daily., Disp: 30 tablet, Rfl: 2  •  fluticasone (FLONASE) 50 MCG/ACT nasal spray, 2 sprays into the nostril(s) as directed by provider Daily., Disp: 9.9 mL, Rfl: 0  •  hydroxychloroquine (PLAQUENIL) 200 MG tablet, Take 1 tablet by mouth 2 (Two) Times a Day for 90 days. Indications: Rheumatoid Arthritis, Disp: 180 tablet, Rfl: 1  •  levothyroxine (SYNTHROID, LEVOTHROID) 50 MCG tablet, Take 1 tablet by mouth Daily., Disp: 90 tablet, Rfl: 1  •  lisinopril-hydrochlorothiazide (PRINZIDE,ZESTORETIC) 10-12.5 MG per tablet, TAKE 1 TABLET BY MOUTH EVERY DAY, Disp: 90 tablet, Rfl: 3  •  Multiple Vitamin (MULTIVITAMIN) capsule, Take 1 capsule by mouth Daily., Disp: , Rfl:   •  potassium chloride 10 MEQ CR tablet, Take one tablet on Tuesday and saturday, Disp: 30 tablet, Rfl: 01  •  rosuvastatin (CRESTOR) 10 MG tablet, Take 1 tablet by mouth every night at bedtime., Disp: 90 tablet, Rfl: 1           Sylvia Almeida, APRN 9/13/2022 15:49 EDT  This note has been electronically signed

## 2022-09-14 LAB
ALBUMIN SERPL-MCNC: 4.4 G/DL (ref 3.6–4.6)
ALBUMIN/GLOB SERPL: 1.8 {RATIO} (ref 1.2–2.2)
ALP SERPL-CCNC: 59 IU/L (ref 44–121)
ALT SERPL-CCNC: 17 IU/L (ref 0–32)
AST SERPL-CCNC: 24 IU/L (ref 0–40)
BASOPHILS # BLD AUTO: 0.1 X10E3/UL (ref 0–0.2)
BASOPHILS NFR BLD AUTO: 1 %
BILIRUB SERPL-MCNC: 0.5 MG/DL (ref 0–1.2)
BUN SERPL-MCNC: 19 MG/DL (ref 8–27)
BUN/CREAT SERPL: 17 (ref 12–28)
CALCIUM SERPL-MCNC: 9.2 MG/DL (ref 8.7–10.3)
CHLORIDE SERPL-SCNC: 100 MMOL/L (ref 96–106)
CHOLEST SERPL-MCNC: 203 MG/DL (ref 100–199)
CO2 SERPL-SCNC: 21 MMOL/L (ref 20–29)
CREAT SERPL-MCNC: 1.13 MG/DL (ref 0.57–1)
EGFRCR-CYS SERPLBLD CKD-EPI 2021: 48 ML/MIN/1.73
EOSINOPHIL # BLD AUTO: 0.2 X10E3/UL (ref 0–0.4)
EOSINOPHIL NFR BLD AUTO: 4 %
ERYTHROCYTE [DISTWIDTH] IN BLOOD BY AUTOMATED COUNT: 13.2 % (ref 11.7–15.4)
GLOBULIN SER CALC-MCNC: 2.5 G/DL (ref 1.5–4.5)
GLUCOSE SERPL-MCNC: 82 MG/DL (ref 65–99)
HCT VFR BLD AUTO: 46.3 % (ref 34–46.6)
HCV AB S/CO SERPL IA: <0.1 S/CO RATIO (ref 0–0.9)
HDLC SERPL-MCNC: 67 MG/DL
HGB BLD-MCNC: 15.2 G/DL (ref 11.1–15.9)
IMM GRANULOCYTES # BLD AUTO: 0 X10E3/UL (ref 0–0.1)
IMM GRANULOCYTES NFR BLD AUTO: 0 %
LDLC SERPL CALC-MCNC: 118 MG/DL (ref 0–99)
LDLC/HDLC SERPL: 1.8 RATIO (ref 0–3.2)
LYMPHOCYTES # BLD AUTO: 2.4 X10E3/UL (ref 0.7–3.1)
LYMPHOCYTES NFR BLD AUTO: 39 %
MCH RBC QN AUTO: 29.9 PG (ref 26.6–33)
MCHC RBC AUTO-ENTMCNC: 32.8 G/DL (ref 31.5–35.7)
MCV RBC AUTO: 91 FL (ref 79–97)
MONOCYTES # BLD AUTO: 0.5 X10E3/UL (ref 0.1–0.9)
MONOCYTES NFR BLD AUTO: 7 %
NEUTROPHILS # BLD AUTO: 3 X10E3/UL (ref 1.4–7)
NEUTROPHILS NFR BLD AUTO: 49 %
PLATELET # BLD AUTO: 216 X10E3/UL (ref 150–450)
POTASSIUM SERPL-SCNC: 4.3 MMOL/L (ref 3.5–5.2)
PROT SERPL-MCNC: 6.9 G/DL (ref 6–8.5)
RBC # BLD AUTO: 5.09 X10E6/UL (ref 3.77–5.28)
SODIUM SERPL-SCNC: 139 MMOL/L (ref 134–144)
T3 SERPL-MCNC: 103 NG/DL (ref 71–180)
T4 FREE SERPL-MCNC: 1.3 NG/DL (ref 0.82–1.77)
TRIGL SERPL-MCNC: 102 MG/DL (ref 0–149)
TSH SERPL DL<=0.005 MIU/L-ACNC: 2.04 UIU/ML (ref 0.45–4.5)
VLDLC SERPL CALC-MCNC: 18 MG/DL (ref 5–40)
WBC # BLD AUTO: 6.2 X10E3/UL (ref 3.4–10.8)

## 2022-10-04 ENCOUNTER — OFFICE VISIT (OUTPATIENT)
Dept: CARDIOLOGY | Facility: CLINIC | Age: 82
End: 2022-10-04

## 2022-10-04 VITALS
WEIGHT: 215.8 LBS | SYSTOLIC BLOOD PRESSURE: 145 MMHG | BODY MASS INDEX: 40.75 KG/M2 | HEART RATE: 67 BPM | OXYGEN SATURATION: 97 % | DIASTOLIC BLOOD PRESSURE: 77 MMHG | HEIGHT: 61 IN

## 2022-10-04 DIAGNOSIS — I25.10 CORONARY ARTERY DISEASE INVOLVING NATIVE CORONARY ARTERY OF NATIVE HEART WITHOUT ANGINA PECTORIS: Primary | ICD-10-CM

## 2022-10-04 DIAGNOSIS — E78.00 PURE HYPERCHOLESTEROLEMIA: ICD-10-CM

## 2022-10-04 DIAGNOSIS — I10 ESSENTIAL HYPERTENSION: ICD-10-CM

## 2022-10-04 PROCEDURE — 99213 OFFICE O/P EST LOW 20 MIN: CPT | Performed by: INTERNAL MEDICINE

## 2022-10-04 NOTE — PROGRESS NOTES
"    Subjective:     Encounter Date:10/04/2022      Patient ID: Dannielle Wang is a 82 y.o. female.    Chief Complaint:  History of Present Illness 82-year-old white female with history of coronary disease history of hypertension hyperlipidemia presents to the office for follow-up.  Patient is currently stable without any symptoms of chest pain or shortness of breath at rest or exertion radiograms any PND orthopnea.  No palpitation dizziness syncope or swelling of the feet.  Patient has been taking all her medicines regularly.  Patient does not smoke.  She stated exercise regularly she follows a good diet.    The following portions of the patient's history were reviewed and updated as appropriate: allergies, current medications, past family history, past medical history, past social history, past surgical history and problem list.  Past Medical History:   Diagnosis Date   • Coronary artery disease    • Hyperlipidemia    • Hypertension    • Hypothyroidism      Past Surgical History:   Procedure Laterality Date   • JOINT REPLACEMENT     • REPLACEMENT TOTAL KNEE       /77 (BP Location: Left arm, Patient Position: Sitting, Cuff Size: Adult)   Pulse 67   Ht 154.9 cm (61\")   Wt 97.9 kg (215 lb 12.8 oz)   LMP  (LMP Unknown)   SpO2 97%   BMI 40.78 kg/m²   Family History   Problem Relation Age of Onset   • Hypertension Mother    • Stroke Mother    • Heart disease Father    • No Known Problems Sister    • No Known Problems Brother    • No Known Problems Maternal Aunt    • No Known Problems Maternal Uncle    • No Known Problems Paternal Aunt    • No Known Problems Paternal Uncle    • No Known Problems Maternal Grandmother    • No Known Problems Maternal Grandfather    • No Known Problems Paternal Grandmother    • No Known Problems Paternal Grandfather    • No Known Problems Other    • Anemia Neg Hx    • Arrhythmia Neg Hx    • Asthma Neg Hx    • Clotting disorder Neg Hx    • Fainting Neg Hx    • Heart attack Neg " Hx    • Heart failure Neg Hx    • Hyperlipidemia Neg Hx        Current Outpatient Medications:   •  acetaminophen (TYLENOL) 325 MG tablet, Take 325 mg by mouth Every 6 (Six) Hours As Needed., Disp: , Rfl:   •  Ascorbic Acid (VITAMIN C) 500 MG capsule, Take 500 mg by mouth Daily., Disp: , Rfl:   •  calcium carbonate-vitamin d 600-400 MG-UNIT per tablet, Take 600 mg by mouth 2 (Two) Times a Day., Disp: , Rfl:   •  cetirizine (ZyrTEC Allergy) 10 MG tablet, Take 1 tablet by mouth Daily., Disp: 30 tablet, Rfl: 2  •  fluticasone (FLONASE) 50 MCG/ACT nasal spray, 2 sprays into the nostril(s) as directed by provider Daily., Disp: 9.9 mL, Rfl: 0  •  hydroxychloroquine (PLAQUENIL) 200 MG tablet, Take 1 tablet by mouth 2 (Two) Times a Day for 90 days. Indications: Rheumatoid Arthritis, Disp: 180 tablet, Rfl: 1  •  levothyroxine (SYNTHROID, LEVOTHROID) 50 MCG tablet, Take 1 tablet by mouth Daily., Disp: 90 tablet, Rfl: 1  •  lisinopril-hydrochlorothiazide (PRINZIDE,ZESTORETIC) 10-12.5 MG per tablet, TAKE 1 TABLET BY MOUTH EVERY DAY, Disp: 90 tablet, Rfl: 3  •  Multiple Vitamin (MULTIVITAMIN) capsule, Take 1 capsule by mouth Daily., Disp: , Rfl:   •  potassium chloride 10 MEQ CR tablet, Take one tablet on Tuesday and saturday, Disp: 30 tablet, Rfl: 01  •  rosuvastatin (CRESTOR) 10 MG tablet, Take 1 tablet by mouth every night at bedtime., Disp: 90 tablet, Rfl: 1  Allergies   Allergen Reactions   • Atorvastatin Calcium Myalgia   • Cephalexin Dizziness     Social History     Socioeconomic History   • Marital status:    Tobacco Use   • Smoking status: Never Smoker   • Smokeless tobacco: Never Used   Substance and Sexual Activity   • Alcohol use: No   • Drug use: No   • Sexual activity: Never     Review of Systems   Constitutional: Negative for fever and malaise/fatigue.   Cardiovascular: Negative for chest pain, dyspnea on exertion and palpitations.   Respiratory: Negative for cough and shortness of breath.    Skin: Negative  for rash.   Gastrointestinal: Negative for abdominal pain, nausea and vomiting.   Neurological: Negative for focal weakness and headaches.   All other systems reviewed and are negative.             Objective:     Constitutional:       Appearance: Well-developed.   Eyes:      General: No scleral icterus.     Conjunctiva/sclera: Conjunctivae normal.   HENT:      Head: Normocephalic and atraumatic.   Neck:      Vascular: No carotid bruit or JVD.   Pulmonary:      Effort: Pulmonary effort is normal.      Breath sounds: Normal breath sounds. No wheezing. No rales.   Cardiovascular:      Normal rate. Regular rhythm.   Pulses:     Intact distal pulses.   Abdominal:      General: Bowel sounds are normal.      Palpations: Abdomen is soft.   Musculoskeletal:      Cervical back: Normal range of motion and neck supple. Skin:     General: Skin is warm and dry.      Findings: No rash.   Neurological:      Mental Status: Alert.       Procedures    Lab Review:         MDM  1.  Coronary disease  Patient has nonobstructive disease in the past with normal B systolic function is currently stable on medications  2.  Hypertension  Patient blood pressure currently stable on lisinopril hydrochlorothiazide  3.  Hyperlipidemia  Patient's lipid levels are slightly high but she is on Crestor and have asked her to watch her diet and take medicines regularly.      Patient's previous medical records, labs, and EKG were reviewed and discussed with the patient at today's visit.

## 2022-12-08 RX ORDER — LISINOPRIL AND HYDROCHLOROTHIAZIDE 12.5; 1 MG/1; MG/1
TABLET ORAL
Qty: 90 TABLET | Refills: 3 | Status: SHIPPED | OUTPATIENT
Start: 2022-12-08

## 2022-12-08 NOTE — TELEPHONE ENCOUNTER
Rx Refill Note  Requested Prescriptions     Pending Prescriptions Disp Refills   • lisinopril-hydrochlorothiazide (PRINZIDE,ZESTORETIC) 10-12.5 MG per tablet [Pharmacy Med Name: Lisinopril-hydroCHLOROthiazide 10-12.5 MG Oral Tablet] 90 tablet 3     Sig: Take 1 tablet by mouth once daily      Last office visit with prescribing clinician: 10/4/2022   Last telemedicine visit with prescribing clinician: Visit date not found   Next office visit with prescribing clinician: 4/4/2023                         Would you like a call back once the refill request has been completed: [] Yes [] No    If the office needs to give you a call back, can they leave a voicemail: [] Yes [] No    Elvia Benjamin MA  12/08/22, 10:02 EST

## 2023-03-07 RX ORDER — ROSUVASTATIN CALCIUM 10 MG/1
TABLET, COATED ORAL
Qty: 90 TABLET | Refills: 0 | Status: SHIPPED | OUTPATIENT
Start: 2023-03-07

## 2023-03-07 RX ORDER — LEVOTHYROXINE SODIUM 0.05 MG/1
TABLET ORAL
Qty: 90 TABLET | Refills: 0 | Status: SHIPPED | OUTPATIENT
Start: 2023-03-07

## 2023-03-07 RX ORDER — HYDROXYCHLOROQUINE SULFATE 200 MG/1
200 TABLET, FILM COATED ORAL 2 TIMES DAILY
Qty: 180 TABLET | Refills: 0 | Status: SHIPPED | OUTPATIENT
Start: 2023-03-07 | End: 2023-06-05

## 2023-03-13 ENCOUNTER — OFFICE VISIT (OUTPATIENT)
Dept: FAMILY MEDICINE CLINIC | Facility: CLINIC | Age: 83
End: 2023-03-13
Payer: MEDICARE

## 2023-03-13 VITALS
WEIGHT: 211.6 LBS | HEART RATE: 79 BPM | HEIGHT: 61 IN | OXYGEN SATURATION: 96 % | DIASTOLIC BLOOD PRESSURE: 94 MMHG | SYSTOLIC BLOOD PRESSURE: 138 MMHG | TEMPERATURE: 97.3 F | BODY MASS INDEX: 39.95 KG/M2

## 2023-03-13 DIAGNOSIS — E66.01 CLASS 3 SEVERE OBESITY DUE TO EXCESS CALORIES WITH SERIOUS COMORBIDITY AND BODY MASS INDEX (BMI) OF 40.0 TO 44.9 IN ADULT: ICD-10-CM

## 2023-03-13 DIAGNOSIS — Z00.00 PREVENTATIVE HEALTH CARE: ICD-10-CM

## 2023-03-13 DIAGNOSIS — E03.9 ACQUIRED HYPOTHYROIDISM: ICD-10-CM

## 2023-03-13 DIAGNOSIS — E78.2 MIXED HYPERLIPIDEMIA: Primary | ICD-10-CM

## 2023-03-13 DIAGNOSIS — R79.89 ELEVATED SERUM CREATININE: ICD-10-CM

## 2023-03-13 DIAGNOSIS — Z78.0 POSTMENOPAUSAL: ICD-10-CM

## 2023-03-13 PROCEDURE — 99213 OFFICE O/P EST LOW 20 MIN: CPT | Performed by: NURSE PRACTITIONER

## 2023-03-13 NOTE — PROGRESS NOTES
"    Dannielle Wang is a 83 y.o. female.     History of Present Illness  83-year-old white female with history of CAD, hypertension, hypothyroidism, TIA, arthritis, bunions and hysterectomy who comes in today for 6-month follow-up    Blood pressure 138/94 heart rate 78 she denies any chest pain, dyspnea, tachycardia or dizziness.  She states at home her bottom number usually in the 70s and 80s and she is going to monitor at home    Patient has no real complaints she is actually feeling quite well.  We will be doing fasting labs    Patient has had 2 COVID vaccines need to schedule an eye exam.  No longer does mammograms but I am scheduling her a DEXA scan at [facility].  She is up-to-date on pneumonia vaccines and no longer does colonoscopies due to her age.  Weight is down 6 pounds at 212 with BMI of 40              DEXA scan  Fasting blood work  Schedule eye exam  DEXA scan  Follow-up 6 months       The following portions of the patient's history were reviewed and updated as appropriate: allergies, current medications, past family history, past medical history, past social history, past surgical history and problem list.    Vitals:    03/13/23 1400   BP: 138/94   BP Location: Right arm   Patient Position: Sitting   Cuff Size: Large Adult   Pulse: 79   Temp: 97.3 °F (36.3 °C)   TempSrc: Infrared   SpO2: 96%   Weight: 96 kg (211 lb 9.6 oz)   Height: 154.9 cm (60.98\")     Body mass index is 40 kg/m².    Past Medical History:   Diagnosis Date   • Coronary artery disease    • Hyperlipidemia    • Hypertension    • Hypothyroidism      Past Surgical History:   Procedure Laterality Date   • JOINT REPLACEMENT     • REPLACEMENT TOTAL KNEE       Family History   Problem Relation Age of Onset   • Hypertension Mother    • Stroke Mother    • Heart disease Father    • No Known Problems Sister    • No Known Problems Brother    • No Known Problems Maternal Aunt    • No Known Problems Maternal Uncle    • No Known Problems Paternal Aunt  "   • No Known Problems Paternal Uncle    • No Known Problems Maternal Grandmother    • No Known Problems Maternal Grandfather    • No Known Problems Paternal Grandmother    • No Known Problems Paternal Grandfather    • No Known Problems Other    • Anemia Neg Hx    • Arrhythmia Neg Hx    • Asthma Neg Hx    • Clotting disorder Neg Hx    • Fainting Neg Hx    • Heart attack Neg Hx    • Heart failure Neg Hx    • Hyperlipidemia Neg Hx      Immunization History   Administered Date(s) Administered   • COVID-19 (MODERNA) 1st, 2nd, 3rd Dose Only 01/15/2021, 02/12/2021   • Fluad Quad 65+ 12/19/2019, 12/17/2020   • Fluzone High Dose =>65 Years ([employer] ONLY) 09/26/2016, 10/16/2018   • Fluzone High-Dose 65+yrs 09/13/2022   • PPD Test 06/02/2004   • Pneumococcal Conjugate 20-Valent (PCV20) 09/13/2022   • Pneumococcal Polysaccharide (PPSV23) 12/17/2020       Office Visit on 09/13/2022   Component Date Value Ref Range Status   • WBC 09/13/2022 6.2  3.4 - 10.8 x10E3/uL Final   • RBC 09/13/2022 5.09  3.77 - 5.28 x10E6/uL Final   • Hemoglobin 09/13/2022 15.2  11.1 - 15.9 g/dL Final   • Hematocrit 09/13/2022 46.3  34.0 - 46.6 % Final   • MCV 09/13/2022 91  79 - 97 fL Final   • MCH 09/13/2022 29.9  26.6 - 33.0 pg Final   • MCHC 09/13/2022 32.8  31.5 - 35.7 g/dL Final   • RDW 09/13/2022 13.2  11.7 - 15.4 % Final   • Platelets 09/13/2022 216  150 - 450 x10E3/uL Final   • Neutrophil Rel % 09/13/2022 49  Not Estab. % Final   • Lymphocyte Rel % 09/13/2022 39  Not Estab. % Final   • Monocyte Rel % 09/13/2022 7  Not Estab. % Final   • Eosinophil Rel % 09/13/2022 4  Not Estab. % Final   • Basophil Rel % 09/13/2022 1  Not Estab. % Final   • Neutrophils Absolute 09/13/2022 3.0  1.4 - 7.0 x10E3/uL Final   • Lymphocytes Absolute 09/13/2022 2.4  0.7 - 3.1 x10E3/uL Final   • Monocytes Absolute 09/13/2022 0.5  0.1 - 0.9 x10E3/uL Final   • Eosinophils Absolute 09/13/2022 0.2  0.0 - 0.4 x10E3/uL Final   • Basophils Absolute 09/13/2022 0.1  0.0 - 0.2  x10E3/uL Final   • Immature Granulocyte Rel % 09/13/2022 0  Not Estab. % Final   • Immature Grans Absolute 09/13/2022 0.0  0.0 - 0.1 x10E3/uL Final   • Glucose 09/13/2022 82  65 - 99 mg/dL Final   • BUN 09/13/2022 19  8 - 27 mg/dL Final   • Creatinine 09/13/2022 1.13 (H)  0.57 - 1.00 mg/dL Final   • EGFR Result 09/13/2022 48 (L)  >59 mL/min/1.73 Final   • BUN/Creatinine Ratio 09/13/2022 17  12 - 28 Final   • Sodium 09/13/2022 139  134 - 144 mmol/L Final   • Potassium 09/13/2022 4.3  3.5 - 5.2 mmol/L Final   • Chloride 09/13/2022 100  96 - 106 mmol/L Final   • Total CO2 09/13/2022 21  20 - 29 mmol/L Final   • Calcium 09/13/2022 9.2  8.7 - 10.3 mg/dL Final   • Total Protein 09/13/2022 6.9  6.0 - 8.5 g/dL Final   • Albumin 09/13/2022 4.4  3.6 - 4.6 g/dL Final   • Globulin 09/13/2022 2.5  1.5 - 4.5 g/dL Final   • A/G Ratio 09/13/2022 1.8  1.2 - 2.2 Final   • Total Bilirubin 09/13/2022 0.5  0.0 - 1.2 mg/dL Final   • Alkaline Phosphatase 09/13/2022 59  44 - 121 IU/L Final   • AST (SGOT) 09/13/2022 24  0 - 40 IU/L Final   • ALT (SGPT) 09/13/2022 17  0 - 32 IU/L Final   • TSH 09/13/2022 2.040  0.450 - 4.500 uIU/mL Final   • Free T4 09/13/2022 1.30  0.82 - 1.77 ng/dL Final   • T3, Total 09/13/2022 103  71 - 180 ng/dL Final   • Total Cholesterol 09/13/2022 203 (H)  100 - 199 mg/dL Final   • Triglycerides 09/13/2022 102  0 - 149 mg/dL Final   • HDL Cholesterol 09/13/2022 67  >39 mg/dL Final   • VLDL Cholesterol Norberto 09/13/2022 18  5 - 40 mg/dL Final   • LDL Chol Calc ([facility]) 09/13/2022 118 (H)  0 - 99 mg/dL Final   • LDL/HDL RATIO 09/13/2022 1.8  0.0 - 3.2 ratio Final    Comment:                                     LDL/HDL Ratio                                              Men  Women                                1/2 Avg.Risk  1.0    1.5                                    Avg.Risk  3.6    3.2                                 2X Avg.Risk  6.2    5.0                                 3X Avg.Risk  8.0    6.1     • Hep C Virus Ab  09/13/2022 <0.1  0.0 - 0.9 s/co ratio Final    Comment:                                   Negative:     < 0.8                               Indeterminate: 0.8 - 0.9                                    Positive:     > 0.9   HCV antibody alone does not differentiate between   previous resolved infection and active infection.   The CDC and current clinical guidelines recommend   that a positive HCV antibody result be followed up   with an HCV RNA test to support the diagnosis of   acute HCV infection. [facility] offers Hepatitis C   Virus (HCV) RNA, Diagnosis, KARINA (305441) and   Hepatitis C Virus (HCV) Antibody with reflex to   Quantitative Real-time PCR (147261).           Review of Systems    Objective   Physical Exam    Procedures    Assessment & Plan   Diagnoses and all orders for this visit:    1. Mixed hyperlipidemia (Primary)  -     Lipid Panel With LDL / HDL Ratio    2. Elevated serum creatinine  -     Comprehensive Metabolic Panel    3. Preventative health care  -     CBC & Differential  -     Hemoglobin A1c    4. Acquired hypothyroidism  -     TSH+Free T4  -     T3    5. Postmenopausal  -     DEXA Bone Density Axial; Future    6. Class 3 severe obesity due to excess calories with serious comorbidity and body mass index (BMI) of 40.0 to 44.9 in adult (HCC)          Current Outpatient Medications:   •  acetaminophen (TYLENOL) 325 MG tablet, Take 1 tablet by mouth Every 6 (Six) Hours As Needed., Disp: , Rfl:   •  Ascorbic Acid (VITAMIN C) 500 MG capsule, Take 500 mg by mouth Daily., Disp: , Rfl:   •  calcium carbonate-vitamin d 600-400 MG-UNIT per tablet, Take 600 mg by mouth 2 (Two) Times a Day., Disp: , Rfl:   •  cetirizine (ZyrTEC Allergy) 10 MG tablet, Take 1 tablet by mouth Daily., Disp: 30 tablet, Rfl: 2  •  fluticasone (FLONASE) 50 MCG/ACT nasal spray, 2 sprays into the nostril(s) as directed by provider Daily., Disp: 9.9 mL, Rfl: 0  •  hydroxychloroquine (PLAQUENIL) 200 MG tablet, Take 1 tablet by mouth 2  (Two) Times a Day for 90 days. Indications: Rheumatoid Arthritis, Disp: 180 tablet, Rfl: 0  •  levothyroxine (SYNTHROID, LEVOTHROID) 50 MCG tablet, Take 1 tablet by mouth once daily, Disp: 90 tablet, Rfl: 0  •  lisinopril-hydrochlorothiazide (PRINZIDE,ZESTORETIC) 10-12.5 MG per tablet, Take 1 tablet by mouth once daily, Disp: 90 tablet, Rfl: 3  •  Multiple Vitamin (MULTIVITAMIN) capsule, Take 1 capsule by mouth Daily., Disp: , Rfl:   •  potassium chloride 10 MEQ CR tablet, Take one tablet on Tuesday and saturday, Disp: 30 tablet, Rfl: 01  •  rosuvastatin (CRESTOR) 10 MG tablet, TAKE 1 TABLET BY MOUTH EVERY DAY AT BEDTIME, Disp: 90 tablet, Rfl: 0           Sylvia Almeida, ROHAN 3/13/2023 14:28 EDT  This note has been electronically signed

## 2023-03-14 LAB
ALBUMIN SERPL-MCNC: 4.2 G/DL (ref 3.6–4.6)
ALBUMIN/GLOB SERPL: 1.7 {RATIO} (ref 1.2–2.2)
ALP SERPL-CCNC: 57 IU/L (ref 44–121)
ALT SERPL-CCNC: 17 IU/L (ref 0–32)
AST SERPL-CCNC: 20 IU/L (ref 0–40)
BASOPHILS # BLD AUTO: 0.1 X10E3/UL (ref 0–0.2)
BASOPHILS NFR BLD AUTO: 2 %
BILIRUB SERPL-MCNC: 0.5 MG/DL (ref 0–1.2)
BUN SERPL-MCNC: 19 MG/DL (ref 8–27)
BUN/CREAT SERPL: 18 (ref 12–28)
CALCIUM SERPL-MCNC: 10.1 MG/DL (ref 8.7–10.3)
CHLORIDE SERPL-SCNC: 105 MMOL/L (ref 96–106)
CHOLEST SERPL-MCNC: 171 MG/DL (ref 100–199)
CO2 SERPL-SCNC: 24 MMOL/L (ref 20–29)
CREAT SERPL-MCNC: 1.07 MG/DL (ref 0.57–1)
EGFRCR SERPLBLD CKD-EPI 2021: 51 ML/MIN/1.73
EOSINOPHIL # BLD AUTO: 0.4 X10E3/UL (ref 0–0.4)
EOSINOPHIL NFR BLD AUTO: 5 %
ERYTHROCYTE [DISTWIDTH] IN BLOOD BY AUTOMATED COUNT: 13.7 % (ref 11.7–15.4)
GLOBULIN SER CALC-MCNC: 2.5 G/DL (ref 1.5–4.5)
GLUCOSE SERPL-MCNC: 87 MG/DL (ref 70–99)
HBA1C MFR BLD: 5.3 % (ref 4.8–5.6)
HCT VFR BLD AUTO: 44.3 % (ref 34–46.6)
HDLC SERPL-MCNC: 64 MG/DL
HGB BLD-MCNC: 15.1 G/DL (ref 11.1–15.9)
IMM GRANULOCYTES # BLD AUTO: 0 X10E3/UL (ref 0–0.1)
IMM GRANULOCYTES NFR BLD AUTO: 0 %
LDLC SERPL CALC-MCNC: 87 MG/DL (ref 0–99)
LDLC/HDLC SERPL: 1.4 RATIO (ref 0–3.2)
LYMPHOCYTES # BLD AUTO: 2.5 X10E3/UL (ref 0.7–3.1)
LYMPHOCYTES NFR BLD AUTO: 37 %
MCH RBC QN AUTO: 30.4 PG (ref 26.6–33)
MCHC RBC AUTO-ENTMCNC: 34.1 G/DL (ref 31.5–35.7)
MCV RBC AUTO: 89 FL (ref 79–97)
MONOCYTES # BLD AUTO: 0.4 X10E3/UL (ref 0.1–0.9)
MONOCYTES NFR BLD AUTO: 6 %
NEUTROPHILS # BLD AUTO: 3.4 X10E3/UL (ref 1.4–7)
NEUTROPHILS NFR BLD AUTO: 50 %
PLATELET # BLD AUTO: 214 X10E3/UL (ref 150–450)
POTASSIUM SERPL-SCNC: 4.7 MMOL/L (ref 3.5–5.2)
PROT SERPL-MCNC: 6.7 G/DL (ref 6–8.5)
RBC # BLD AUTO: 4.97 X10E6/UL (ref 3.77–5.28)
SODIUM SERPL-SCNC: 145 MMOL/L (ref 134–144)
T3 SERPL-MCNC: 99 NG/DL (ref 71–180)
T4 FREE SERPL-MCNC: 1.4 NG/DL (ref 0.82–1.77)
TRIGL SERPL-MCNC: 115 MG/DL (ref 0–149)
TSH SERPL DL<=0.005 MIU/L-ACNC: 1.99 UIU/ML (ref 0.45–4.5)
VLDLC SERPL CALC-MCNC: 20 MG/DL (ref 5–40)
WBC # BLD AUTO: 6.8 X10E3/UL (ref 3.4–10.8)

## 2023-04-03 NOTE — PROGRESS NOTES
Encounter Date:04/04/2023      Patient ID: Dannielle Wang is a 83 y.o. female.    Chief Complaint   Patient presents with   • Follow-up     6 month F/U   • Coronary Artery Disease          History of Present Illness  83-year-old with history of nonobstructive coronary artery disease, hypertension, hyperlipidemia who presents for follow-up.  Says she has been doing well from a cardiac perspective but does note that her blood pressure has been elevated in the 150s systolic.  She has not taken her blood pressure medication today but even when she does it is elevated.  Denies any lightheadedness or dizziness.  She has chronic shortness of breath but has remained stable and is only with exertion.  Denies any chest pain.  No lower extremity edema orthopnea or PND.    EKG in clinic today shows normal sinus rhythm with a rate of 88 bpm and left axis deviation with left anterior fascicular block.  No change compared to prior EKG.    I reviewed her cardiac catheterization from 2012 which shows about 40% stenosis in the mid LAD.  Her LAD is actually very small caliber and does not reach the apex.  She has a dual LAD system with a very large diagonal which further bifurcates and supplies the majority of the anterior wall.  Her RCA has luminal irregularities.    The following portions of the patient's history were reviewed and updated as appropriate: allergies, current medications, past family history, past medical history, past social history, past surgical history, and problem list.    Review of Systems   Constitutional: Negative for malaise/fatigue.   Cardiovascular: Negative for chest pain, dyspnea on exertion, leg swelling and palpitations.   Respiratory: Positive for shortness of breath. Negative for cough.    Gastrointestinal: Negative for abdominal pain, nausea and vomiting.   Neurological: Negative for dizziness, focal weakness, headaches, light-headedness and numbness.   All other systems reviewed and are  negative.        Current Outpatient Medications:   •  acetaminophen (TYLENOL) 325 MG tablet, Take 1 tablet by mouth Every 6 (Six) Hours As Needed., Disp: , Rfl:   •  Ascorbic Acid (VITAMIN C) 500 MG capsule, Take 500 mg by mouth Daily., Disp: , Rfl:   •  calcium carbonate-vitamin d 600-400 MG-UNIT per tablet, Take 600 mg by mouth 2 (Two) Times a Day., Disp: , Rfl:   •  cetirizine (ZyrTEC Allergy) 10 MG tablet, Take 1 tablet by mouth Daily. (Patient taking differently: Take 1 tablet by mouth As Needed.), Disp: 30 tablet, Rfl: 2  •  fluticasone (FLONASE) 50 MCG/ACT nasal spray, 2 sprays into the nostril(s) as directed by provider Daily. (Patient taking differently: 2 sprays into the nostril(s) as directed by provider As Needed.), Disp: 9.9 mL, Rfl: 0  •  hydroxychloroquine (PLAQUENIL) 200 MG tablet, Take 1 tablet by mouth 2 (Two) Times a Day for 90 days. Indications: Rheumatoid Arthritis, Disp: 180 tablet, Rfl: 0  •  levothyroxine (SYNTHROID, LEVOTHROID) 50 MCG tablet, Take 1 tablet by mouth once daily, Disp: 90 tablet, Rfl: 0  •  lisinopril-hydrochlorothiazide (PRINZIDE,ZESTORETIC) 10-12.5 MG per tablet, Take 1 tablet by mouth once daily, Disp: 90 tablet, Rfl: 3  •  Multiple Vitamin (MULTIVITAMIN) capsule, Take 1 capsule by mouth Daily., Disp: , Rfl:   •  potassium chloride 10 MEQ CR tablet, Take one tablet on Tuesday and saturday, Disp: 30 tablet, Rfl: 01  •  rosuvastatin (CRESTOR) 10 MG tablet, TAKE 1 TABLET BY MOUTH EVERY DAY AT BEDTIME, Disp: 90 tablet, Rfl: 0    Allergies   Allergen Reactions   • Atorvastatin Calcium Myalgia   • Cephalexin Dizziness       Family History   Problem Relation Age of Onset   • Hypertension Mother    • Stroke Mother    • Heart disease Father    • No Known Problems Sister    • No Known Problems Brother    • No Known Problems Maternal Aunt    • No Known Problems Maternal Uncle    • No Known Problems Paternal Aunt    • No Known Problems Paternal Uncle    • No Known Problems Maternal  "Grandmother    • No Known Problems Maternal Grandfather    • No Known Problems Paternal Grandmother    • No Known Problems Paternal Grandfather    • No Known Problems Other    • Anemia Neg Hx    • Arrhythmia Neg Hx    • Asthma Neg Hx    • Clotting disorder Neg Hx    • Fainting Neg Hx    • Heart attack Neg Hx    • Heart failure Neg Hx    • Hyperlipidemia Neg Hx        Past Surgical History:   Procedure Laterality Date   • JOINT REPLACEMENT     • REPLACEMENT TOTAL KNEE         Past Medical History:   Diagnosis Date   • Coronary artery disease    • Hyperlipidemia    • Hypertension    • Hypothyroidism        Social History     Socioeconomic History   • Marital status:    Tobacco Use   • Smoking status: Never   • Smokeless tobacco: Never   Vaping Use   • Vaping Use: Never used   Substance and Sexual Activity   • Alcohol use: No   • Drug use: No   • Sexual activity: Defer         Procedures      Objective:       Physical Exam    /81 (BP Location: Right arm, Patient Position: Sitting)   Pulse 88   Ht 153.7 cm (60.5\")   Wt 95.7 kg (211 lb)   LMP  (LMP Unknown)   SpO2 96%   BMI 40.53 kg/m²   The patient is alert, oriented and in no distress.    Vital signs as noted above.    Head and neck revealed no carotid bruits or jugular venous distension.  No thyromegaly or lymphadenopathy is present.    Lungs clear.  No wheezing.  Breath sounds are normal bilaterally.    Heart normal first and second heart sounds.  No murmur..  No pericardial rub is present.  No gallop is present.    Abdomen soft and nontender.  No organomegaly is present.    Extremities revealed good peripheral pulses without any pedal edema.    Skin warm and dry.    Musculoskeletal system is grossly normal.    CNS grossly normal.           Diagnosis Plan   1. Mixed hyperlipidemia        2. Primary hypertension        3. Coronary artery disease involving native coronary artery of native heart without angina pectoris        LAB RESULTS (LAST 7 " DAYS)    CBC        BMP        CMP         BNP        TROPONIN        CoAg        Creatinine Clearance  Estimated Creatinine Clearance: 41 mL/min (A) (by C-G formula based on SCr of 1.07 mg/dL (H)).    ABG        Radiology  No radiology results for the last day         Assessment and Plan       Diagnoses and all orders for this visit:    1. Mixed hyperlipidemia (Primary)    2. Primary hypertension    3. Coronary artery disease involving native coronary artery of native heart without angina pectoris       Hypertension  Uncontrolled  Add amlodipine 5 mg  Continue lisinopril HCTZ combo    Hyperlipidemia  Continue with Crestor    Nonobstructive CAD  Remains chest pain-free  Continue aspirin    45 minutes spent in patient care on this date of service including but not limited to preparing to see the patient, obtaining and/or reviewing history, performing medically appropriate examination, ordering tests and procedures, independently interpreting results and documenting information in EHR and counseling and education of patient and family.  It this excludes time spent on other separate services such as performing procedures or test interpretation if applicable      Monserrat Tatum MD

## 2023-04-04 ENCOUNTER — OFFICE VISIT (OUTPATIENT)
Dept: CARDIOLOGY | Facility: CLINIC | Age: 83
End: 2023-04-04
Payer: MEDICARE

## 2023-04-04 VITALS
OXYGEN SATURATION: 96 % | BODY MASS INDEX: 39.84 KG/M2 | SYSTOLIC BLOOD PRESSURE: 150 MMHG | HEIGHT: 61 IN | WEIGHT: 211 LBS | HEART RATE: 88 BPM | DIASTOLIC BLOOD PRESSURE: 81 MMHG

## 2023-04-04 DIAGNOSIS — I25.10 CORONARY ARTERY DISEASE INVOLVING NATIVE CORONARY ARTERY OF NATIVE HEART WITHOUT ANGINA PECTORIS: ICD-10-CM

## 2023-04-04 DIAGNOSIS — I10 PRIMARY HYPERTENSION: Primary | ICD-10-CM

## 2023-04-04 DIAGNOSIS — E78.2 MIXED HYPERLIPIDEMIA: ICD-10-CM

## 2023-04-04 PROCEDURE — 99215 OFFICE O/P EST HI 40 MIN: CPT | Performed by: INTERNAL MEDICINE

## 2023-04-04 PROCEDURE — 1160F RVW MEDS BY RX/DR IN RCRD: CPT | Performed by: INTERNAL MEDICINE

## 2023-04-04 PROCEDURE — 1159F MED LIST DOCD IN RCRD: CPT | Performed by: INTERNAL MEDICINE

## 2023-04-04 RX ORDER — AMLODIPINE BESYLATE 5 MG/1
5 TABLET ORAL DAILY
Qty: 90 TABLET | Refills: 3 | Status: SHIPPED | OUTPATIENT
Start: 2023-04-04

## 2023-06-05 RX ORDER — ROSUVASTATIN CALCIUM 10 MG/1
TABLET, COATED ORAL
Qty: 90 TABLET | Refills: 0 | Status: SHIPPED | OUTPATIENT
Start: 2023-06-05

## 2023-06-05 RX ORDER — LEVOTHYROXINE SODIUM 0.05 MG/1
TABLET ORAL
Qty: 90 TABLET | Refills: 0 | Status: SHIPPED | OUTPATIENT
Start: 2023-06-05

## 2023-06-05 RX ORDER — HYDROXYCHLOROQUINE SULFATE 200 MG/1
TABLET, FILM COATED ORAL
Qty: 180 TABLET | Refills: 0 | Status: SHIPPED | OUTPATIENT
Start: 2023-06-05 | End: 2023-09-03

## 2023-09-06 RX ORDER — LEVOTHYROXINE SODIUM 0.05 MG/1
TABLET ORAL
Qty: 90 TABLET | Refills: 0 | Status: SHIPPED | OUTPATIENT
Start: 2023-09-06

## 2023-09-06 RX ORDER — HYDROXYCHLOROQUINE SULFATE 200 MG/1
TABLET, FILM COATED ORAL
Qty: 180 TABLET | Refills: 0 | Status: SHIPPED | OUTPATIENT
Start: 2023-09-06 | End: 2023-12-05

## 2023-09-06 RX ORDER — ROSUVASTATIN CALCIUM 10 MG/1
TABLET, COATED ORAL
Qty: 90 TABLET | Refills: 0 | Status: SHIPPED | OUTPATIENT
Start: 2023-09-06

## 2023-09-13 ENCOUNTER — OFFICE VISIT (OUTPATIENT)
Dept: FAMILY MEDICINE CLINIC | Facility: CLINIC | Age: 83
End: 2023-09-13
Payer: MEDICARE

## 2023-09-13 VITALS
SYSTOLIC BLOOD PRESSURE: 166 MMHG | DIASTOLIC BLOOD PRESSURE: 66 MMHG | HEIGHT: 61 IN | OXYGEN SATURATION: 96 % | BODY MASS INDEX: 40.44 KG/M2 | HEART RATE: 78 BPM | TEMPERATURE: 96.8 F | WEIGHT: 214.2 LBS

## 2023-09-13 DIAGNOSIS — E78.2 MIXED HYPERLIPIDEMIA: Primary | ICD-10-CM

## 2023-09-13 DIAGNOSIS — E03.9 ACQUIRED HYPOTHYROIDISM: ICD-10-CM

## 2023-09-13 DIAGNOSIS — Z00.00 PREVENTATIVE HEALTH CARE: ICD-10-CM

## 2023-09-13 DIAGNOSIS — E66.01 CLASS 3 SEVERE OBESITY DUE TO EXCESS CALORIES WITH SERIOUS COMORBIDITY AND BODY MASS INDEX (BMI) OF 40.0 TO 44.9 IN ADULT: ICD-10-CM

## 2023-09-13 DIAGNOSIS — R79.89 ELEVATED SERUM CREATININE: ICD-10-CM

## 2023-09-13 NOTE — PROGRESS NOTES
"    Dannielle Wang is a 83 y.o. female.     History of Present Illness  83-year-old white female with history of CAD, hypertension, hypothyroidism, TIA, arthritis, bunions and hysterectomy who comes in today for 6-month follow-up visit    Blood pressure 166/66 heart rate 78 she denies any chest pain, dyspnea, tachycardia or dizziness    Patient really has no new complaints is doing quite well.  Last blood work in March was pretty stable we will be doing fasting labs today.  Weight is up 3 pounds at 214 with a BMI of 41.1.  She had 2 COVID vaccines up-to-date on eye exam no longer does mammograms DEXA scans or colonoscopies due to age            Fasting blood work  Diet and watch her weight  Follow-up 6 months     The following portions of the patient's history were reviewed and updated as appropriate: allergies, current medications, past family history, past medical history, past social history, past surgical history, and problem list.    Vitals:    09/13/23 1425 09/13/23 1427   BP: 170/74 166/66   BP Location: Right arm Right arm   Patient Position: Sitting Sitting   Cuff Size: Large Adult Large Adult   Pulse: 78    Temp: 96.8 °F (36 °C)    TempSrc: Infrared    SpO2: 96%    Weight: 97.2 kg (214 lb 3.2 oz)    Height: 153.7 cm (60.51\")      Body mass index is 41.13 kg/m².    Past Medical History:   Diagnosis Date    Coronary artery disease     Hyperlipidemia     Hypertension     Hypothyroidism      Past Surgical History:   Procedure Laterality Date    JOINT REPLACEMENT      REPLACEMENT TOTAL KNEE       Family History   Problem Relation Age of Onset    Hypertension Mother     Stroke Mother     Heart disease Father     No Known Problems Sister     No Known Problems Brother     No Known Problems Maternal Aunt     No Known Problems Maternal Uncle     No Known Problems Paternal Aunt     No Known Problems Paternal Uncle     No Known Problems Maternal Grandmother     No Known Problems Maternal Grandfather     No Known " Problems Paternal Grandmother     No Known Problems Paternal Grandfather     No Known Problems Other     Anemia Neg Hx     Arrhythmia Neg Hx     Asthma Neg Hx     Clotting disorder Neg Hx     Fainting Neg Hx     Heart attack Neg Hx     Heart failure Neg Hx     Hyperlipidemia Neg Hx      Immunization History   Administered Date(s) Administered    COVID-19 (MODERNA) 1st,2nd,3rd Dose Monovalent 01/15/2021, 02/12/2021    Fluad Quad 65+ 12/19/2019, 12/17/2020    Fluzone High Dose =>65 Years ([employer] ONLY) 09/26/2016, 10/16/2018    Fluzone High-Dose 65+yrs 09/13/2022    PPD Test 06/02/2004    Pneumococcal Conjugate 20-Valent (PCV20) 09/13/2022    Pneumococcal Polysaccharide (PPSV23) 12/17/2020       Office Visit on 03/13/2023   Component Date Value Ref Range Status    WBC 03/13/2023 6.8  3.4 - 10.8 x10E3/uL Final    RBC 03/13/2023 4.97  3.77 - 5.28 x10E6/uL Final    Hemoglobin 03/13/2023 15.1  11.1 - 15.9 g/dL Final    Hematocrit 03/13/2023 44.3  34.0 - 46.6 % Final    MCV 03/13/2023 89  79 - 97 fL Final    MCH 03/13/2023 30.4  26.6 - 33.0 pg Final    MCHC 03/13/2023 34.1  31.5 - 35.7 g/dL Final    RDW 03/13/2023 13.7  11.7 - 15.4 % Final    Platelets 03/13/2023 214  150 - 450 x10E3/uL Final    Neutrophil Rel % 03/13/2023 50  Not Estab. % Final    Lymphocyte Rel % 03/13/2023 37  Not Estab. % Final    Monocyte Rel % 03/13/2023 6  Not Estab. % Final    Eosinophil Rel % 03/13/2023 5  Not Estab. % Final    Basophil Rel % 03/13/2023 2  Not Estab. % Final    Neutrophils Absolute 03/13/2023 3.4  1.4 - 7.0 x10E3/uL Final    Lymphocytes Absolute 03/13/2023 2.5  0.7 - 3.1 x10E3/uL Final    Monocytes Absolute 03/13/2023 0.4  0.1 - 0.9 x10E3/uL Final    Eosinophils Absolute 03/13/2023 0.4  0.0 - 0.4 x10E3/uL Final    Basophils Absolute 03/13/2023 0.1  0.0 - 0.2 x10E3/uL Final    Immature Granulocyte Rel % 03/13/2023 0  Not Estab. % Final    Immature Grans Absolute 03/13/2023 0.0  0.0 - 0.1 x10E3/uL Final    Glucose 03/13/2023 87  70  - 99 mg/dL Final    BUN 03/13/2023 19  8 - 27 mg/dL Final    Creatinine 03/13/2023 1.07 (H)  0.57 - 1.00 mg/dL Final    EGFR Result 03/13/2023 51 (L)  >59 mL/min/1.73 Final    BUN/Creatinine Ratio 03/13/2023 18  12 - 28 Final    Sodium 03/13/2023 145 (H)  134 - 144 mmol/L Final    Potassium 03/13/2023 4.7  3.5 - 5.2 mmol/L Final    Chloride 03/13/2023 105  96 - 106 mmol/L Final    Total CO2 03/13/2023 24  20 - 29 mmol/L Final    Calcium 03/13/2023 10.1  8.7 - 10.3 mg/dL Final    Total Protein 03/13/2023 6.7  6.0 - 8.5 g/dL Final    Albumin 03/13/2023 4.2  3.6 - 4.6 g/dL Final    Globulin 03/13/2023 2.5  1.5 - 4.5 g/dL Final    A/G Ratio 03/13/2023 1.7  1.2 - 2.2 Final    Total Bilirubin 03/13/2023 0.5  0.0 - 1.2 mg/dL Final    Alkaline Phosphatase 03/13/2023 57  44 - 121 IU/L Final    AST (SGOT) 03/13/2023 20  0 - 40 IU/L Final    ALT (SGPT) 03/13/2023 17  0 - 32 IU/L Final    Total Cholesterol 03/13/2023 171  100 - 199 mg/dL Final    Triglycerides 03/13/2023 115  0 - 149 mg/dL Final    HDL Cholesterol 03/13/2023 64  >39 mg/dL Final    VLDL Cholesterol Norberto 03/13/2023 20  5 - 40 mg/dL Final    LDL Chol Calc (NIH) 03/13/2023 87  0 - 99 mg/dL Final    LDL/HDL RATIO 03/13/2023 1.4  0.0 - 3.2 ratio Final    Comment:                                     LDL/HDL Ratio                                              Men  Women                                1/2 Avg.Risk  1.0    1.5                                    Avg.Risk  3.6    3.2                                 2X Avg.Risk  6.2    5.0                                 3X Avg.Risk  8.0    6.1      TSH 03/13/2023 1.990  0.450 - 4.500 uIU/mL Final    Free T4 03/13/2023 1.40  0.82 - 1.77 ng/dL Final    T3, Total 03/13/2023 99  71 - 180 ng/dL Final    Hemoglobin A1C 03/13/2023 5.3  4.8 - 5.6 % Final    Comment:          Prediabetes: 5.7 - 6.4           Diabetes: >6.4           Glycemic control for adults with diabetes: <7.0           Review of Systems   Constitutional:  Negative.    HENT: Negative.     Respiratory: Negative.     Cardiovascular: Negative.    Gastrointestinal: Negative.    Genitourinary: Negative.    Musculoskeletal: Negative.    Skin: Negative.    Neurological: Negative.    Psychiatric/Behavioral: Negative.       Objective   Physical Exam  Constitutional:       Appearance: Normal appearance.   HENT:      Head: Normocephalic.   Cardiovascular:      Rate and Rhythm: Normal rate and regular rhythm.      Pulses: Normal pulses.      Heart sounds: Normal heart sounds.   Pulmonary:      Effort: Pulmonary effort is normal.      Breath sounds: Normal breath sounds.   Abdominal:      General: Bowel sounds are normal.   Musculoskeletal:         General: Normal range of motion.   Skin:     General: Skin is warm.   Neurological:      General: No focal deficit present.      Mental Status: She is alert and oriented to person, place, and time.   Psychiatric:         Mood and Affect: Mood normal.         Behavior: Behavior normal.       Procedures    Assessment & Plan   Diagnoses and all orders for this visit:    1. Mixed hyperlipidemia (Primary)  -     Lipid Panel With LDL / HDL Ratio    2. Acquired hypothyroidism  -     TSH+Free T4  -     T3    3. Elevated serum creatinine  -     Comprehensive Metabolic Panel    4. Preventative health care  -     Hemoglobin A1c    5. Class 3 severe obesity due to excess calories with serious comorbidity and body mass index (BMI) of 40.0 to 44.9 in adult           Current Outpatient Medications:     acetaminophen (TYLENOL) 325 MG tablet, Take 1 tablet by mouth Every 6 (Six) Hours As Needed., Disp: , Rfl:     amLODIPine (NORVASC) 5 MG tablet, Take 1 tablet by mouth Daily., Disp: 90 tablet, Rfl: 3    Ascorbic Acid (VITAMIN C) 500 MG capsule, Take 500 mg by mouth Daily., Disp: , Rfl:     calcium carbonate-vitamin d 600-400 MG-UNIT per tablet, Take 600 mg by mouth 2 (Two) Times a Day., Disp: , Rfl:     cetirizine (ZyrTEC Allergy) 10 MG tablet, Take 1  tablet by mouth Daily. (Patient taking differently: Take 1 tablet by mouth As Needed.), Disp: 30 tablet, Rfl: 2    fluticasone (FLONASE) 50 MCG/ACT nasal spray, 2 sprays into the nostril(s) as directed by provider Daily. (Patient taking differently: 2 sprays into the nostril(s) as directed by provider As Needed.), Disp: 9.9 mL, Rfl: 0    hydroxychloroquine (PLAQUENIL) 200 MG tablet, TAKE 1 TABLET BY MOUTH TWICE DAILY FOR RHEUMATOID ARTHRITIS, Disp: 180 tablet, Rfl: 0    levothyroxine (SYNTHROID, LEVOTHROID) 50 MCG tablet, Take 1 tablet by mouth once daily, Disp: 90 tablet, Rfl: 0    lisinopril-hydrochlorothiazide (PRINZIDE,ZESTORETIC) 10-12.5 MG per tablet, Take 1 tablet by mouth once daily, Disp: 90 tablet, Rfl: 3    Multiple Vitamin (MULTIVITAMIN) capsule, Take 1 capsule by mouth Daily., Disp: , Rfl:     potassium chloride 10 MEQ CR tablet, Take one tablet on Tuesday and saturday, Disp: 30 tablet, Rfl: 01    rosuvastatin (CRESTOR) 10 MG tablet, TAKE 1 TABLET BY MOUTH ONCE DAILY AT BEDTIME, Disp: 90 tablet, Rfl: 0           ROHAN Hernadez 9/13/2023 15:17 EDT  This note has been electronically signed

## 2023-09-14 LAB
ALBUMIN SERPL-MCNC: 4.4 G/DL (ref 3.7–4.7)
ALBUMIN/GLOB SERPL: 1.8 {RATIO} (ref 1.2–2.2)
ALP SERPL-CCNC: 52 IU/L (ref 44–121)
ALT SERPL-CCNC: 16 IU/L (ref 0–32)
AST SERPL-CCNC: 22 IU/L (ref 0–40)
BILIRUB SERPL-MCNC: 0.5 MG/DL (ref 0–1.2)
BUN SERPL-MCNC: 15 MG/DL (ref 8–27)
BUN/CREAT SERPL: 14 (ref 12–28)
CALCIUM SERPL-MCNC: 9.9 MG/DL (ref 8.7–10.3)
CHLORIDE SERPL-SCNC: 101 MMOL/L (ref 96–106)
CHOLEST SERPL-MCNC: 176 MG/DL (ref 100–199)
CO2 SERPL-SCNC: 26 MMOL/L (ref 20–29)
CREAT SERPL-MCNC: 1.11 MG/DL (ref 0.57–1)
EGFRCR SERPLBLD CKD-EPI 2021: 49 ML/MIN/1.73
GLOBULIN SER CALC-MCNC: 2.4 G/DL (ref 1.5–4.5)
GLUCOSE SERPL-MCNC: 77 MG/DL (ref 70–99)
HBA1C MFR BLD: 5.4 % (ref 4.8–5.6)
HDLC SERPL-MCNC: 73 MG/DL
LDLC SERPL CALC-MCNC: 87 MG/DL (ref 0–99)
LDLC/HDLC SERPL: 1.2 RATIO (ref 0–3.2)
POTASSIUM SERPL-SCNC: 4.1 MMOL/L (ref 3.5–5.2)
PROT SERPL-MCNC: 6.8 G/DL (ref 6–8.5)
SODIUM SERPL-SCNC: 143 MMOL/L (ref 134–144)
T3 SERPL-MCNC: 101 NG/DL (ref 71–180)
T4 FREE SERPL-MCNC: 1.57 NG/DL (ref 0.82–1.77)
TRIGL SERPL-MCNC: 91 MG/DL (ref 0–149)
TSH SERPL DL<=0.005 MIU/L-ACNC: 1.28 UIU/ML (ref 0.45–4.5)
VLDLC SERPL CALC-MCNC: 16 MG/DL (ref 5–40)

## 2023-10-03 ENCOUNTER — OFFICE VISIT (OUTPATIENT)
Dept: CARDIOLOGY | Facility: CLINIC | Age: 83
End: 2023-10-03
Payer: MEDICARE

## 2023-10-03 VITALS
WEIGHT: 214 LBS | SYSTOLIC BLOOD PRESSURE: 150 MMHG | HEIGHT: 61 IN | OXYGEN SATURATION: 96 % | DIASTOLIC BLOOD PRESSURE: 74 MMHG | BODY MASS INDEX: 40.4 KG/M2 | HEART RATE: 66 BPM

## 2023-10-03 DIAGNOSIS — E78.2 MIXED HYPERLIPIDEMIA: ICD-10-CM

## 2023-10-03 DIAGNOSIS — I25.10 CORONARY ARTERY DISEASE INVOLVING NATIVE CORONARY ARTERY OF NATIVE HEART WITHOUT ANGINA PECTORIS: Primary | ICD-10-CM

## 2023-10-03 DIAGNOSIS — I10 ESSENTIAL HYPERTENSION: ICD-10-CM

## 2023-10-03 RX ORDER — LISINOPRIL AND HYDROCHLOROTHIAZIDE 25; 20 MG/1; MG/1
1 TABLET ORAL DAILY
Qty: 90 TABLET | Refills: 3 | Status: SHIPPED | OUTPATIENT
Start: 2023-10-03

## 2023-10-03 RX ORDER — ASPIRIN 81 MG/1
81 TABLET ORAL DAILY
Qty: 90 TABLET | Refills: 3 | Status: SHIPPED | OUTPATIENT
Start: 2023-10-03

## 2023-10-03 RX ORDER — AMLODIPINE BESYLATE 10 MG/1
10 TABLET ORAL DAILY
Qty: 90 TABLET | Refills: 3 | Status: SHIPPED | OUTPATIENT
Start: 2023-10-03

## 2023-10-03 NOTE — PROGRESS NOTES
Encounter Date:04/04/2023      Patient ID: Dannielle Wang is a 83 y.o. female.    Chief Complaint   Patient presents with    Follow-up     6 month F/U          History of Present Illness  83-year-old with history of nonobstructive coronary artery disease, hypertension, hyperlipidemia who presents for follow-up.      She continues to do well from a cardiac perspective.  Denies any chest pain or shortness of breath.  Blood pressure is elevated in clinic today.  Says she has not taken her amlodipine as she takes this in the evening.  Denies any headaches.  No orthopnea or PND.  No lower extremity edema.  No palpitations or dizziness or lightheadedness.  \\  EKG in clinic today shows normal sinus rhythm with left axis deviation and a single PVC.  No change compared to prior EKG except PVC is now present.      Previous note  I reviewed her cardiac catheterization from 2012 which shows about 40% stenosis in the mid LAD.  Her LAD is actually very small caliber and does not reach the apex.  She has a dual LAD system with a very large diagonal which further bifurcates and supplies the majority of the anterior wall.  Her RCA has luminal irregularities.    The following portions of the patient's history were reviewed and updated as appropriate: allergies, current medications, past family history, past medical history, past social history, past surgical history, and problem list.    Review of Systems   Constitutional: Negative for malaise/fatigue.   Cardiovascular:  Negative for chest pain, dyspnea on exertion, leg swelling and palpitations.   Respiratory:  Positive for shortness of breath. Negative for cough.    Gastrointestinal:  Negative for abdominal pain, nausea and vomiting.   Neurological:  Negative for dizziness, focal weakness, headaches, light-headedness and numbness.   All other systems reviewed and are negative.      Current Outpatient Medications:     acetaminophen (TYLENOL) 325 MG tablet, Take 1 tablet by mouth  Every 6 (Six) Hours As Needed., Disp: , Rfl:     Ascorbic Acid (VITAMIN C) 500 MG capsule, Take 500 mg by mouth Daily., Disp: , Rfl:     calcium carbonate-vitamin d 600-400 MG-UNIT per tablet, Take 600 mg by mouth 2 (Two) Times a Day., Disp: , Rfl:     cetirizine (ZyrTEC Allergy) 10 MG tablet, Take 1 tablet by mouth Daily. (Patient taking differently: Take 1 tablet by mouth As Needed.), Disp: 30 tablet, Rfl: 2    fluticasone (FLONASE) 50 MCG/ACT nasal spray, 2 sprays into the nostril(s) as directed by provider Daily. (Patient taking differently: 2 sprays into the nostril(s) as directed by provider As Needed.), Disp: 9.9 mL, Rfl: 0    hydroxychloroquine (PLAQUENIL) 200 MG tablet, TAKE 1 TABLET BY MOUTH TWICE DAILY FOR RHEUMATOID ARTHRITIS, Disp: 180 tablet, Rfl: 0    levothyroxine (SYNTHROID, LEVOTHROID) 50 MCG tablet, Take 1 tablet by mouth once daily, Disp: 90 tablet, Rfl: 0    Multiple Vitamin (MULTIVITAMIN) capsule, Take 1 capsule by mouth Daily., Disp: , Rfl:     potassium chloride 10 MEQ CR tablet, Take one tablet on Tuesday and saturday, Disp: 30 tablet, Rfl: 01    rosuvastatin (CRESTOR) 10 MG tablet, TAKE 1 TABLET BY MOUTH ONCE DAILY AT BEDTIME, Disp: 90 tablet, Rfl: 0    amLODIPine (NORVASC) 10 MG tablet, Take 1 tablet by mouth Daily., Disp: 90 tablet, Rfl: 3    aspirin 81 MG EC tablet, Take 1 tablet by mouth Daily., Disp: 90 tablet, Rfl: 3    lisinopril-hydrochlorothiazide (PRINZIDE,ZESTORETIC) 20-25 MG per tablet, Take 1 tablet by mouth Daily., Disp: 90 tablet, Rfl: 3    Allergies   Allergen Reactions    Atorvastatin Calcium Myalgia    Cephalexin Dizziness       Family History   Problem Relation Age of Onset    Hypertension Mother     Stroke Mother     Heart disease Father     No Known Problems Sister     No Known Problems Brother     No Known Problems Maternal Aunt     No Known Problems Maternal Uncle     No Known Problems Paternal Aunt     No Known Problems Paternal Uncle     No Known Problems Maternal  "Grandmother     No Known Problems Maternal Grandfather     No Known Problems Paternal Grandmother     No Known Problems Paternal Grandfather     No Known Problems Other     Anemia Neg Hx     Arrhythmia Neg Hx     Asthma Neg Hx     Clotting disorder Neg Hx     Fainting Neg Hx     Heart attack Neg Hx     Heart failure Neg Hx     Hyperlipidemia Neg Hx        Past Surgical History:   Procedure Laterality Date    JOINT REPLACEMENT      REPLACEMENT TOTAL KNEE         Past Medical History:   Diagnosis Date    Coronary artery disease     Hyperlipidemia     Hypertension     Hypothyroidism        Social History     Socioeconomic History    Marital status:    Tobacco Use    Smoking status: Never    Smokeless tobacco: Never   Vaping Use    Vaping Use: Never used   Substance and Sexual Activity    Alcohol use: No    Drug use: No    Sexual activity: Defer         Procedures      Objective:       Physical Exam    /74 (BP Location: Left arm, Patient Position: Sitting)   Pulse 66   Ht 153.7 cm (60.51\")   Wt 97.1 kg (214 lb)   LMP  (LMP Unknown)   SpO2 96%   BMI 41.09 kg/m²   The patient is alert, oriented and in no distress.  Obese    Vital signs as noted above.    Head and neck revealed no carotid bruits or jugular venous distension.  No thyromegaly or lymphadenopathy is present.    Lungs clear.  No wheezing.  Breath sounds are normal bilaterally.    Heart normal first and second heart sounds.  No murmur..  No pericardial rub is present.  No gallop is present.    Abdomen soft and nontender.  No organomegaly is present.    Extremities revealed good peripheral pulses without any pedal edema.    Skin warm and dry.    Musculoskeletal system is grossly normal.    CNS grossly normal.           Diagnosis Plan   1. Coronary artery disease involving native coronary artery of native heart without angina pectoris [I25.10 (ICD-10-CM)]        2. Mixed hyperlipidemia        3. Essential hypertension        LAB RESULTS (LAST 7 " DAYS)    CBC        BMP        CMP         BNP        TROPONIN        CoAg        Creatinine Clearance  Estimated Creatinine Clearance: 39.8 mL/min (A) (by C-G formula based on SCr of 1.11 mg/dL (H)).    ABG        Radiology  No radiology results for the last day         Assessment and Plan       Diagnoses and all orders for this visit:    1. Coronary artery disease involving native coronary artery of native heart without angina pectoris [I25.10 (ICD-10-CM)] (Primary)    2. Mixed hyperlipidemia    3. Essential hypertension    Other orders  -     lisinopril-hydrochlorothiazide (PRINZIDE,ZESTORETIC) 20-25 MG per tablet; Take 1 tablet by mouth Daily.  Dispense: 90 tablet; Refill: 3  -     amLODIPine (NORVASC) 10 MG tablet; Take 1 tablet by mouth Daily.  Dispense: 90 tablet; Refill: 3  -     aspirin 81 MG EC tablet; Take 1 tablet by mouth Daily.  Dispense: 90 tablet; Refill: 3       Hypertension  Uncontrolled  Increase amlodipine to 10 mg  Increase lisinopril HCTZ to 20/25    Hyperlipidemia  Continue with Crestor    Nonobstructive CAD  Remains chest pain-free  Continue aspirin        Monserrat Tatum MD

## 2023-12-05 RX ORDER — ROSUVASTATIN CALCIUM 10 MG/1
TABLET, COATED ORAL
Qty: 90 TABLET | Refills: 0 | Status: SHIPPED | OUTPATIENT
Start: 2023-12-05

## 2023-12-05 RX ORDER — LEVOTHYROXINE SODIUM 0.05 MG/1
TABLET ORAL
Qty: 90 TABLET | Refills: 0 | Status: SHIPPED | OUTPATIENT
Start: 2023-12-05

## 2023-12-05 RX ORDER — HYDROXYCHLOROQUINE SULFATE 200 MG/1
TABLET, FILM COATED ORAL
Qty: 180 TABLET | Refills: 0 | Status: SHIPPED | OUTPATIENT
Start: 2023-12-05 | End: 2024-03-04

## 2024-02-29 RX ORDER — LEVOTHYROXINE SODIUM 0.05 MG/1
TABLET ORAL
Qty: 90 TABLET | Refills: 0 | Status: SHIPPED | OUTPATIENT
Start: 2024-02-29

## 2024-02-29 RX ORDER — ROSUVASTATIN CALCIUM 10 MG/1
TABLET, COATED ORAL
Qty: 90 TABLET | Refills: 0 | Status: SHIPPED | OUTPATIENT
Start: 2024-02-29

## 2024-02-29 RX ORDER — HYDROXYCHLOROQUINE SULFATE 200 MG/1
TABLET, FILM COATED ORAL
Qty: 180 TABLET | Refills: 0 | Status: SHIPPED | OUTPATIENT
Start: 2024-02-29 | End: 2024-05-29

## 2024-03-14 ENCOUNTER — OFFICE VISIT (OUTPATIENT)
Dept: FAMILY MEDICINE CLINIC | Facility: CLINIC | Age: 84
End: 2024-03-14
Payer: MEDICARE

## 2024-03-14 VITALS
SYSTOLIC BLOOD PRESSURE: 144 MMHG | DIASTOLIC BLOOD PRESSURE: 60 MMHG | WEIGHT: 206 LBS | BODY MASS INDEX: 38.89 KG/M2 | OXYGEN SATURATION: 95 % | HEIGHT: 61 IN | TEMPERATURE: 98 F | HEART RATE: 92 BPM

## 2024-03-14 DIAGNOSIS — E78.2 MIXED HYPERLIPIDEMIA: Primary | ICD-10-CM

## 2024-03-14 DIAGNOSIS — R79.89 ELEVATED SERUM CREATININE: ICD-10-CM

## 2024-03-14 DIAGNOSIS — E03.9 ACQUIRED HYPOTHYROIDISM: ICD-10-CM

## 2024-03-14 DIAGNOSIS — Z00.00 PREVENTATIVE HEALTH CARE: ICD-10-CM

## 2024-03-14 DIAGNOSIS — Z78.0 POSTMENOPAUSAL: ICD-10-CM

## 2024-03-14 DIAGNOSIS — E66.09 CLASS 2 OBESITY DUE TO EXCESS CALORIES WITHOUT SERIOUS COMORBIDITY WITH BODY MASS INDEX (BMI) OF 39.0 TO 39.9 IN ADULT: ICD-10-CM

## 2024-03-14 PROBLEM — E66.812 CLASS 2 OBESITY DUE TO EXCESS CALORIES WITHOUT SERIOUS COMORBIDITY WITH BODY MASS INDEX (BMI) OF 39.0 TO 39.9 IN ADULT: Status: ACTIVE | Noted: 2024-03-14

## 2024-03-14 NOTE — PROGRESS NOTES
The ABCs of the Annual Wellness Visit  Subsequent Medicare Wellness Visit    Subjective    Dannielle Wang is a 84 y.o. female who presents for a Subsequent Medicare Wellness Visit.    The following portions of the patient's history were reviewed and   updated as appropriate: allergies, current medications, past medical history, past social history, past surgical history, and problem list.    Compared to one year ago, the patient feels her physical   health is the same.    Compared to one year ago, the patient feels her mental   health is the same.    Recent Hospitalizations:  She was not admitted to the hospital during the last year.       Current Medical Providers:  Patient Care Team:  Sylvia Almeida APRN as PCP - General (Nurse Practitioner)  Sylvia Almeida APRN as PCP - Family Medicine  Virgilio Ruiz MD as Consulting Physician (Cardiology)  Alberto Qiu MD as Consulting Physician (Gastroenterology)    Outpatient Medications Prior to Visit   Medication Sig Dispense Refill    acetaminophen (TYLENOL) 325 MG tablet Take 1 tablet by mouth Every 6 (Six) Hours As Needed.      amLODIPine (NORVASC) 10 MG tablet Take 1 tablet by mouth Daily. 90 tablet 3    Ascorbic Acid (VITAMIN C) 500 MG capsule Take 500 mg by mouth Daily.      aspirin 81 MG EC tablet Take 1 tablet by mouth Daily. 90 tablet 3    calcium carbonate-vitamin d 600-400 MG-UNIT per tablet Take 600 mg by mouth 2 (Two) Times a Day.      cetirizine (ZyrTEC Allergy) 10 MG tablet Take 1 tablet by mouth Daily. (Patient taking differently: Take 1 tablet by mouth As Needed.) 30 tablet 2    fluticasone (FLONASE) 50 MCG/ACT nasal spray 2 sprays into the nostril(s) as directed by provider Daily. (Patient taking differently: 2 sprays into the nostril(s) as directed by provider As Needed.) 9.9 mL 0    hydroxychloroquine (PLAQUENIL) 200 MG tablet TAKE 1 TABLET BY MOUTH TWICE DAILY FOR RHEUMATOID ARTHRITIS 180 tablet 0    levothyroxine (SYNTHROID,  LEVOTHROID) 50 MCG tablet Take 1 tablet by mouth once daily 90 tablet 0    lisinopril-hydrochlorothiazide (PRINZIDE,ZESTORETIC) 20-25 MG per tablet Take 1 tablet by mouth Daily. 90 tablet 3    Multiple Vitamin (MULTIVITAMIN) capsule Take 1 capsule by mouth Daily.      potassium chloride 10 MEQ CR tablet Take one tablet on Tuesday and saturday 30 tablet 01    rosuvastatin (CRESTOR) 10 MG tablet TAKE 1 TABLET BY MOUTH ONCE DAILY AT BEDTIME 90 tablet 0     No facility-administered medications prior to visit.       No opioid medication identified on active medication list. I have reviewed chart for other potential  high risk medication/s and harmful drug interactions in the elderly.        Aspirin is on active medication list. Aspirin use is indicated based on review of current medical condition/s. Pros and cons of this therapy have been discussed today. Benefits of this medication outweigh potential harm.  Patient has been encouraged to continue taking this medication.  .      Patient Active Problem List   Diagnosis    Bunion    Hammer toe    Corns and callosity    Coronary artery disease    Cystocele, lateral    Diverticulosis of colon    Elevated serum creatinine    Encounter for immunization    Fatigue    Hepatic cyst    Herniation of rectum into vagina    Hyperlipidemia    Cardiac disease    Hypothyroidism    Insomnia    Nausea    Osteopenia    Other screening mammogram    Overactive bladder    Pain in soft tissues of limb    Rheumatoid arthritis    Stress incontinence    Temporary cerebral vascular dysfunction    Preventative health care    Class 2 obesity due to excess calories without serious comorbidity with body mass index (BMI) of 39.0 to 39.9 in adult     Advance Care Planning   Advance Care Planning     Advance Directive is not on file.  ACP discussion was declined by the patient. Patient does not have an advance directive, information provided.     Objective    Vitals:    03/14/24 1301 03/14/24 1307   BP:  "158/64 144/60   BP Location: Right arm Right arm   Patient Position: Sitting Sitting   Cuff Size: Adult Adult   Pulse: 92 92   Temp: 98 °F (36.7 °C) 98 °F (36.7 °C)   TempSrc: Infrared Infrared   SpO2: 95% 95%   Weight: 93.4 kg (206 lb) 93.4 kg (206 lb)   Height: 153.7 cm (60.51\") 153.7 cm (60.51\")     Estimated body mass index is 39.55 kg/m² as calculated from the following:    Height as of this encounter: 153.7 cm (60.51\").    Weight as of this encounter: 93.4 kg (206 lb).    Class 2 Severe Obesity (BMI >=35 and <=39.9). Obesity-related health conditions include the following: none. Obesity is unchanged. BMI is is above average; no BMI management plan is appropriate. We discussed low calorie, low carb based diet program, portion control, and increasing exercise.      Does the patient have evidence of cognitive impairment? No          HEALTH RISK ASSESSMENT    Smoking Status:  Social History     Tobacco Use   Smoking Status Never   Smokeless Tobacco Never     Alcohol Consumption:  Social History     Substance and Sexual Activity   Alcohol Use No     Fall Risk Screen:    STEADI Fall Risk Assessment was completed, and patient is at LOW risk for falls.Assessment completed on:3/14/2024    Depression Screening:      3/14/2024     1:03 PM   PHQ-2/PHQ-9 Depression Screening   Little Interest or Pleasure in Doing Things 0-->not at all   Feeling Down, Depressed or Hopeless 0-->not at all   Trouble Falling or Staying Asleep, or Sleeping Too Much 0-->not at all   Feeling Tired or Having Little Energy 0-->not at all   Poor Appetite or Overeating 0-->not at all   Feeling Bad about Yourself - or that You are a Failure or Have Let Yourself or Your Family Down 0-->not at all   Trouble Concentrating on Things, Such as Reading the Newspaper or Watching Television 0-->not at all   Moving or Speaking So Slowly that Other People Could Have Noticed? Or the Opposite - Being So Fidgety 0-->not at all   Thoughts that You Would be Better " Off Dead or of Hurting Yourself in Some Way 0-->not at all   PHQ-9: Brief Depression Severity Measure Score 0   If You Checked Off Any Problems, How Difficult Have These Problems Made It For You to Do Your Work, Take Care of Things at Home, or Get Along with Other People? not difficult at all       Health Habits and Functional and Cognitive Screening:      3/14/2024     1:00 PM   Functional & Cognitive Status   Do you have difficulty preparing food and eating? No   Do you have difficulty bathing yourself, getting dressed or grooming yourself? No   Do you have difficulty using the toilet? No   Do you have difficulty moving around from place to place? No   Do you have trouble with steps or getting out of a bed or a chair? No   Current Diet Well Balanced Diet   Dental Exam Not up to date   Eye Exam Up to date   Exercise (times per week) 6 times per week   Current Exercises Include House Cleaning   Do you need help using the phone?  No   Are you deaf or do you have serious difficulty hearing?  Yes   Do you need help to go to places out of walking distance? No   Do you need help shopping? No   Do you need help preparing meals?  No   Do you need help with housework?  No   Do you need help with laundry? No   Do you need help taking your medications? No   Do you need help managing money? No   Do you ever drive or ride in a car without wearing a seat belt? No   Have you felt unusual stress, anger or loneliness in the last month? No   Who do you live with? Alone   If you need help, do you have trouble finding someone available to you? No   Do you have difficulty concentrating, remembering or making decisions? No       Age-appropriate Screening Schedule:  Refer to the list below for future screening recommendations based on patient's age, sex and/or medical conditions. Orders for these recommended tests are listed in the plan section. The patient has been provided with a written plan.    Health Maintenance   Topic Date Due     TDAP/TD VACCINES (1 - Tdap) Never done    ZOSTER VACCINE (1 of 2) Never done    RSV Vaccine - Adults (1 - 1-dose 60+ series) Never done    COLORECTAL CANCER SCREENING  03/04/2022    MAMMOGRAM  03/31/2022    BMI FOLLOWUP  06/24/2022    DXA SCAN  03/31/2023    INFLUENZA VACCINE  08/01/2023    COVID-19 Vaccine (3 - 2023-24 season) 09/01/2023    ANNUAL WELLNESS VISIT  09/13/2023    LIPID PANEL  09/13/2024    Pneumococcal Vaccine 65+  Completed                  CMS Preventative Services Quick Reference  Risk Factors Identified During Encounter  Fall Risk-High or Moderate: Discussed Fall Prevention in the home  The above risks/problems have been discussed with the patient.  Pertinent information has been shared with the patient in the After Visit Summary.  An After Visit Summary and PPPS were made available to the patient.    Follow Up:   Next Medicare Wellness visit to be scheduled in 1 year.       Additional E&M Note during same encounter follows:  Patient has multiple medical problems which are significant and separately identifiable that require additional work above and beyond the Medicare Wellness Visit.      Chief Complaint  Medicare Wellness-subsequent    Subjective        84-year-old white female with history of CAD, hypertension, hypothyroidism, TIA, arthritis, bunions and hysterectomy comes in today for Medicare wellness visit    Blood pressure 144/60 heart rate 92 she denies any chest pain, dyspnea, tachycardia or dizziness    Patient's last blood work was pretty good with exception of mildly elevated creatinine.  She states she is doing well and has no issues which is great for her age    She is lost 8 pounds with a weight of 206 and a BMI of 39.6.  She has had 2 COVID vaccines and is up-to-date on eye exam.  I am going to order a DEXA scan which she can schedule at [place].  She no longer does any other preventative maintenance due to her age    We discussed living will and advanced directives and  "patient states she was not interested            Fasting blood work  DEXA scan  Continue with weight loss  Follow-up 6 months      Dannielle Wang is also being seen today for     Review of Systems   Constitutional: Negative.    HENT: Negative.     Respiratory: Negative.     Cardiovascular: Negative.    Gastrointestinal: Negative.    Genitourinary: Negative.    Musculoskeletal: Negative.    Skin: Negative.    Neurological: Negative.    Psychiatric/Behavioral: Negative.         Objective   Vital Signs:  /60 (BP Location: Right arm, Patient Position: Sitting, Cuff Size: Adult)   Pulse 92   Temp 98 °F (36.7 °C) (Infrared)   Ht 153.7 cm (60.51\")   Wt 93.4 kg (206 lb)   SpO2 95%   BMI 39.55 kg/m²     Physical Exam  Constitutional:       Appearance: Normal appearance.   HENT:      Head: Normocephalic.   Cardiovascular:      Rate and Rhythm: Normal rate and regular rhythm.      Pulses: Normal pulses.      Heart sounds: Normal heart sounds.   Pulmonary:      Effort: Pulmonary effort is normal.      Breath sounds: Normal breath sounds.   Abdominal:      General: Bowel sounds are normal.   Skin:     General: Skin is warm.   Neurological:      General: No focal deficit present.      Mental Status: She is alert and oriented to person, place, and time.   Psychiatric:         Mood and Affect: Mood normal.         Behavior: Behavior normal.                         Assessment and Plan   Diagnoses and all orders for this visit:    1. Mixed hyperlipidemia (Primary)  -     Lipid Panel With LDL / HDL Ratio    2. Acquired hypothyroidism  -     TSH+Free T4  -     T3    3. Preventative health care  -     CBC & Differential  -     Hemoglobin A1c    4. Elevated serum creatinine  -     Comprehensive Metabolic Panel    5. Class 2 obesity due to excess calories without serious comorbidity with body mass index (BMI) of 39.0 to 39.9 in adult             Follow Up   No follow-ups on file.  Patient was given instructions and " counseling regarding her condition or for health maintenance advice. Please see specific information pulled into the AVS if appropriate.

## 2024-03-15 LAB
ALBUMIN SERPL-MCNC: 4.2 G/DL (ref 3.7–4.7)
ALBUMIN/GLOB SERPL: 1.6 {RATIO} (ref 1.2–2.2)
ALP SERPL-CCNC: 55 IU/L (ref 44–121)
ALT SERPL-CCNC: 15 IU/L (ref 0–32)
AST SERPL-CCNC: 20 IU/L (ref 0–40)
BASOPHILS # BLD AUTO: 0.1 X10E3/UL (ref 0–0.2)
BASOPHILS NFR BLD AUTO: 1 %
BILIRUB SERPL-MCNC: 0.7 MG/DL (ref 0–1.2)
BUN SERPL-MCNC: 18 MG/DL (ref 8–27)
BUN/CREAT SERPL: 15 (ref 12–28)
CALCIUM SERPL-MCNC: 10 MG/DL (ref 8.7–10.3)
CHLORIDE SERPL-SCNC: 99 MMOL/L (ref 96–106)
CHOLEST SERPL-MCNC: 168 MG/DL (ref 100–199)
CO2 SERPL-SCNC: 25 MMOL/L (ref 20–29)
CREAT SERPL-MCNC: 1.17 MG/DL (ref 0.57–1)
EGFRCR SERPLBLD CKD-EPI 2021: 46 ML/MIN/1.73
EOSINOPHIL # BLD AUTO: 0.2 X10E3/UL (ref 0–0.4)
EOSINOPHIL NFR BLD AUTO: 2 %
ERYTHROCYTE [DISTWIDTH] IN BLOOD BY AUTOMATED COUNT: 14.1 % (ref 11.7–15.4)
GLOBULIN SER CALC-MCNC: 2.6 G/DL (ref 1.5–4.5)
GLUCOSE SERPL-MCNC: 82 MG/DL (ref 70–99)
HBA1C MFR BLD: 5.4 % (ref 4.8–5.6)
HCT VFR BLD AUTO: 43.4 % (ref 34–46.6)
HDLC SERPL-MCNC: 70 MG/DL
HGB BLD-MCNC: 14.4 G/DL (ref 11.1–15.9)
IMM GRANULOCYTES # BLD AUTO: 0 X10E3/UL (ref 0–0.1)
IMM GRANULOCYTES NFR BLD AUTO: 0 %
LDLC SERPL CALC-MCNC: 81 MG/DL (ref 0–99)
LDLC/HDLC SERPL: 1.2 RATIO (ref 0–3.2)
LYMPHOCYTES # BLD AUTO: 2.4 X10E3/UL (ref 0.7–3.1)
LYMPHOCYTES NFR BLD AUTO: 23 %
MCH RBC QN AUTO: 30.4 PG (ref 26.6–33)
MCHC RBC AUTO-ENTMCNC: 33.2 G/DL (ref 31.5–35.7)
MCV RBC AUTO: 92 FL (ref 79–97)
MONOCYTES # BLD AUTO: 0.7 X10E3/UL (ref 0.1–0.9)
MONOCYTES NFR BLD AUTO: 7 %
NEUTROPHILS # BLD AUTO: 6.9 X10E3/UL (ref 1.4–7)
NEUTROPHILS NFR BLD AUTO: 67 %
PLATELET # BLD AUTO: 211 X10E3/UL (ref 150–450)
POTASSIUM SERPL-SCNC: 3.9 MMOL/L (ref 3.5–5.2)
PROT SERPL-MCNC: 6.8 G/DL (ref 6–8.5)
RBC # BLD AUTO: 4.73 X10E6/UL (ref 3.77–5.28)
SODIUM SERPL-SCNC: 139 MMOL/L (ref 134–144)
T3 SERPL-MCNC: 100 NG/DL (ref 71–180)
T4 FREE SERPL-MCNC: 1.51 NG/DL (ref 0.82–1.77)
TRIGL SERPL-MCNC: 95 MG/DL (ref 0–149)
TSH SERPL DL<=0.005 MIU/L-ACNC: 1.48 UIU/ML (ref 0.45–4.5)
VLDLC SERPL CALC-MCNC: 17 MG/DL (ref 5–40)
WBC # BLD AUTO: 10.3 X10E3/UL (ref 3.4–10.8)

## 2024-04-02 ENCOUNTER — OFFICE VISIT (OUTPATIENT)
Dept: CARDIOLOGY | Facility: CLINIC | Age: 84
End: 2024-04-02
Payer: MEDICARE

## 2024-04-02 VITALS
WEIGHT: 209 LBS | SYSTOLIC BLOOD PRESSURE: 139 MMHG | OXYGEN SATURATION: 97 % | DIASTOLIC BLOOD PRESSURE: 60 MMHG | HEART RATE: 67 BPM | HEIGHT: 61 IN | BODY MASS INDEX: 39.46 KG/M2

## 2024-04-02 DIAGNOSIS — I25.10 CORONARY ARTERY DISEASE INVOLVING NATIVE CORONARY ARTERY OF NATIVE HEART WITHOUT ANGINA PECTORIS: ICD-10-CM

## 2024-04-02 DIAGNOSIS — E78.2 MIXED HYPERLIPIDEMIA: ICD-10-CM

## 2024-04-02 DIAGNOSIS — I10 PRIMARY HYPERTENSION: Primary | ICD-10-CM

## 2024-04-04 PROBLEM — I10 PRIMARY HYPERTENSION: Status: ACTIVE | Noted: 2024-04-04

## 2024-04-12 ENCOUNTER — TELEPHONE (OUTPATIENT)
Dept: FAMILY MEDICINE CLINIC | Facility: CLINIC | Age: 84
End: 2024-04-12
Payer: MEDICARE

## 2024-04-12 NOTE — TELEPHONE ENCOUNTER
Caller: Felipe Dannielle G    Relationship: Self    Best call back number: [phone]    What orders are you requesting (i.e. lab or imaging): CARDIAC DOPPLER ULTRASOUND     In what timeframe would the patient need to come in: AS SOON AS POSSIBLE    Where will you receive your lab/imaging services: [facility]     Additional notes: PATIENT STATES SHE WENT TO [facility] EMERGENCY ROOM LAST NIGHT AND WAS DIAGNOSED WITH A T.I.A.    PATIENT STATES THE EMERGENCY ROOM ADVISED HER TO CALL AND ASK FOR AN ORDER FROM SIL[facility].    PATIENT IS REQUESTING A CALL BACK TO LET HER KNOW THE ORDER HAS BEEN PLACED.

## 2024-04-15 ENCOUNTER — OFFICE VISIT (OUTPATIENT)
Dept: FAMILY MEDICINE CLINIC | Facility: CLINIC | Age: 84
End: 2024-04-15
Payer: MEDICARE

## 2024-04-15 VITALS
SYSTOLIC BLOOD PRESSURE: 128 MMHG | BODY MASS INDEX: 38.89 KG/M2 | DIASTOLIC BLOOD PRESSURE: 74 MMHG | HEART RATE: 62 BPM | OXYGEN SATURATION: 98 % | HEIGHT: 61 IN | TEMPERATURE: 97.5 F | WEIGHT: 206 LBS

## 2024-04-15 DIAGNOSIS — G83.32: ICD-10-CM

## 2024-04-15 DIAGNOSIS — Z13.220 LIPID SCREENING: ICD-10-CM

## 2024-04-15 DIAGNOSIS — R79.89 ELEVATED SERUM CREATININE: ICD-10-CM

## 2024-04-15 DIAGNOSIS — Z00.00 PREVENTATIVE HEALTH CARE: ICD-10-CM

## 2024-04-15 DIAGNOSIS — G45.9 TIA (TRANSIENT ISCHEMIC ATTACK): Primary | ICD-10-CM

## 2024-04-15 DIAGNOSIS — E03.9 ACQUIRED HYPOTHYROIDISM: ICD-10-CM

## 2024-04-15 PROCEDURE — 3074F SYST BP LT 130 MM HG: CPT | Performed by: NURSE PRACTITIONER

## 2024-04-15 PROCEDURE — 3078F DIAST BP <80 MM HG: CPT | Performed by: NURSE PRACTITIONER

## 2024-04-15 PROCEDURE — 99213 OFFICE O/P EST LOW 20 MIN: CPT | Performed by: NURSE PRACTITIONER

## 2024-04-15 RX ORDER — ASPIRIN 325 MG
325 TABLET, DELAYED RELEASE (ENTERIC COATED) ORAL EVERY 6 HOURS PRN
Qty: 30 TABLET | Refills: 2 | Status: SHIPPED | OUTPATIENT
Start: 2024-04-15

## 2024-04-15 NOTE — PROGRESS NOTES
Dannielle Wang is a 84 y.o. female.     History of Present Illness  84-year-old white female with history of CAD, hypertension, hypothyroidism, TIA, arthritis, bunions and hysterectomy who comes in today post ER visit for left lower facial numbness.    Blood pressure 132/76 heart rate 62 she denies any chest pain, dyspnea, tachycardia or dizziness    Patient went to the emergency room at [place] complaining of numbness in her left side of her throat and lower part of the face.  I do not have a note from [place] or any x-rays and we have called to get those unfortunately still has not been sent.  Patient said they did do a CT of the head that they told it was normal however I do not have that to view.  She states the numbness is still there but it is a little better.  She was told by the ER she had a TIA.  There were no other neurosymptoms on exam she denies any visual changes, facial droop or speech problems and  were equal bilaterally.  She denies any problems with swallowing.  I am going to order her carotid Dopplers which she will schedule soon as possible.  I told her if symptoms worsen to go to for [facility] ER    Weight is 206 with a BMI of 39.6.  She has had 2 COVID vaccines is up-to-date on eye exam.  She still needs to schedule her DEXA scan and I gave her the number to schedule at [place] possibly at the same time as her carotid ultrasound.  She no longer does mammograms or colon cancer screening due to her age            Bilateral carotid ultrasound  DEXA scan at [place]  If any worsening of neurosymptoms go to the emergency room at [place]           The following portions of the patient's history were reviewed and updated as appropriate: allergies, current medications, past family history, past medical history, past social history, past surgical history, and problem list.    Vitals:    04/15/24 0850   BP: 128/74   BP Location: Right arm   Patient Position: Sitting   Cuff Size: Large Adult  "  Pulse: 62   Temp: 97.5 °F (36.4 °C)   TempSrc: Infrared   SpO2: 98%   Weight: 93.4 kg (206 lb)   Height: 153.7 cm (60.51\")     Body mass index is 39.55 kg/m².    Past Medical History:   Diagnosis Date    Coronary artery disease     Hyperlipidemia     Hypertension     Hypothyroidism      Past Surgical History:   Procedure Laterality Date    JOINT REPLACEMENT      REPLACEMENT TOTAL KNEE       Family History   Problem Relation Age of Onset    Hypertension Mother     Stroke Mother     Heart disease Father     No Known Problems Sister     No Known Problems Brother     No Known Problems Maternal Aunt     No Known Problems Maternal Uncle     No Known Problems Paternal Aunt     No Known Problems Paternal Uncle     No Known Problems Maternal Grandmother     No Known Problems Maternal Grandfather     No Known Problems Paternal Grandmother     No Known Problems Paternal Grandfather     No Known Problems Other     Anemia Neg Hx     Arrhythmia Neg Hx     Asthma Neg Hx     Clotting disorder Neg Hx     Fainting Neg Hx     Heart attack Neg Hx     Heart failure Neg Hx     Hyperlipidemia Neg Hx      Immunization History   Administered Date(s) Administered    COVID-19 (MODERNA) 1st,2nd,3rd Dose Monovalent 01/15/2021, 02/12/2021    Fluad Quad 65+ 12/19/2019, 12/17/2020    Fluzone High Dose =>65 Years ([employer] ONLY) 09/26/2016, 10/16/2018    Fluzone High-Dose 65+yrs 09/13/2022    PPD Test 06/02/2004    Pneumococcal Conjugate 20-Valent (PCV20) 09/13/2022    Pneumococcal Polysaccharide (PPSV23) 12/17/2020       Office Visit on 03/14/2024   Component Date Value Ref Range Status    WBC 03/14/2024 10.3  3.4 - 10.8 x10E3/uL Final    RBC 03/14/2024 4.73  3.77 - 5.28 x10E6/uL Final    Hemoglobin 03/14/2024 14.4  11.1 - 15.9 g/dL Final    Hematocrit 03/14/2024 43.4  34.0 - 46.6 % Final    MCV 03/14/2024 92  79 - 97 fL Final    MCH 03/14/2024 30.4  26.6 - 33.0 pg Final    MCHC 03/14/2024 33.2  31.5 - 35.7 g/dL Final    RDW 03/14/2024 14.1  11.7 " - 15.4 % Final    Platelets 03/14/2024 211  150 - 450 x10E3/uL Final    Neutrophil Rel % 03/14/2024 67  Not Estab. % Final    Lymphocyte Rel % 03/14/2024 23  Not Estab. % Final    Monocyte Rel % 03/14/2024 7  Not Estab. % Final    Eosinophil Rel % 03/14/2024 2  Not Estab. % Final    Basophil Rel % 03/14/2024 1  Not Estab. % Final    Neutrophils Absolute 03/14/2024 6.9  1.4 - 7.0 x10E3/uL Final    Lymphocytes Absolute 03/14/2024 2.4  0.7 - 3.1 x10E3/uL Final    Monocytes Absolute 03/14/2024 0.7  0.1 - 0.9 x10E3/uL Final    Eosinophils Absolute 03/14/2024 0.2  0.0 - 0.4 x10E3/uL Final    Basophils Absolute 03/14/2024 0.1  0.0 - 0.2 x10E3/uL Final    Immature Granulocyte Rel % 03/14/2024 0  Not Estab. % Final    Immature Grans Absolute 03/14/2024 0.0  0.0 - 0.1 x10E3/uL Final    Glucose 03/14/2024 82  70 - 99 mg/dL Final    BUN 03/14/2024 18  8 - 27 mg/dL Final    Creatinine 03/14/2024 1.17 (H)  0.57 - 1.00 mg/dL Final    EGFR Result 03/14/2024 46 (L)  >59 mL/min/1.73 Final    BUN/Creatinine Ratio 03/14/2024 15  12 - 28 Final    Sodium 03/14/2024 139  134 - 144 mmol/L Final    Potassium 03/14/2024 3.9  3.5 - 5.2 mmol/L Final    Chloride 03/14/2024 99  96 - 106 mmol/L Final    Total CO2 03/14/2024 25  20 - 29 mmol/L Final    Calcium 03/14/2024 10.0  8.7 - 10.3 mg/dL Final    Total Protein 03/14/2024 6.8  6.0 - 8.5 g/dL Final    Albumin 03/14/2024 4.2  3.7 - 4.7 g/dL Final    Globulin 03/14/2024 2.6  1.5 - 4.5 g/dL Final    A/G Ratio 03/14/2024 1.6  1.2 - 2.2 Final    Total Bilirubin 03/14/2024 0.7  0.0 - 1.2 mg/dL Final    Alkaline Phosphatase 03/14/2024 55  44 - 121 IU/L Final    AST (SGOT) 03/14/2024 20  0 - 40 IU/L Final    ALT (SGPT) 03/14/2024 15  0 - 32 IU/L Final    Hemoglobin A1C 03/14/2024 5.4  4.8 - 5.6 % Final    Comment:          Prediabetes: 5.7 - 6.4           Diabetes: >6.4           Glycemic control for adults with diabetes: <7.0      Total Cholesterol 03/14/2024 168  100 - 199 mg/dL Final     Triglycerides 03/14/2024 95  0 - 149 mg/dL Final    HDL Cholesterol 03/14/2024 70  >39 mg/dL Final    VLDL Cholesterol Norberto 03/14/2024 17  5 - 40 mg/dL Final    LDL Chol Calc (NIH) 03/14/2024 81  0 - 99 mg/dL Final    LDL/HDL RATIO 03/14/2024 1.2  0.0 - 3.2 ratio Final    Comment:                                     LDL/HDL Ratio                                              Men  Women                                1/2 Avg.Risk  1.0    1.5                                    Avg.Risk  3.6    3.2                                 2X Avg.Risk  6.2    5.0                                 3X Avg.Risk  8.0    6.1      TSH 03/14/2024 1.480  0.450 - 4.500 uIU/mL Final    Free T4 03/14/2024 1.51  0.82 - 1.77 ng/dL Final    T3, Total 03/14/2024 100  71 - 180 ng/dL Final         Review of Systems   Constitutional: Negative.    HENT: Negative.     Respiratory: Negative.     Cardiovascular: Negative.    Gastrointestinal: Negative.    Genitourinary: Negative.    Musculoskeletal: Negative.    Skin: Negative.    Neurological:  Positive for numbness.   Psychiatric/Behavioral: Negative.         Objective   Physical Exam  Constitutional:       Appearance: Normal appearance.   HENT:      Head: Normocephalic.   Cardiovascular:      Rate and Rhythm: Normal rate and regular rhythm.      Pulses: Normal pulses.      Heart sounds: Normal heart sounds.   Pulmonary:      Effort: Pulmonary effort is normal.      Breath sounds: Normal breath sounds.   Abdominal:      General: Bowel sounds are normal.   Musculoskeletal:         General: Normal range of motion.   Skin:     General: Skin is warm.   Neurological:      General: No focal deficit present.      Mental Status: She is alert.      Comments: Facial numbness under her chin on left side of neck and lower part of the left side of face that is improved since ER visit with no other neurosymptoms   Psychiatric:         Mood and Affect: Mood normal.         Behavior: Behavior normal.          Procedures    Assessment & Plan   Diagnoses and all orders for this visit:    1. TIA (transient ischemic attack) (Primary)  -     US Carotid Bilateral; Future    2. Elevated serum creatinine    3. Acquired hypothyroidism  -     TSH+Free T4  -     T3    4. Preventative health care  -     Hemoglobin A1c    5. Lipid screening  -     Lipid Panel With LDL / HDL Ratio    6. Monoplegia, unspecified affecting left dominant side  -     US Carotid Bilateral; Future    Other orders  -     aspirin 325 MG EC tablet; Take 1 tablet by mouth Every 6 (Six) Hours As Needed for Mild Pain.  Dispense: 30 tablet; Refill: 2           Current Outpatient Medications:     acetaminophen (TYLENOL) 325 MG tablet, Take 1 tablet by mouth Every 6 (Six) Hours As Needed., Disp: , Rfl:     amLODIPine (NORVASC) 10 MG tablet, Take 1 tablet by mouth Daily., Disp: 90 tablet, Rfl: 3    Ascorbic Acid (VITAMIN C) 500 MG capsule, Take 500 mg by mouth Daily., Disp: , Rfl:     calcium carbonate-vitamin d 600-400 MG-UNIT per tablet, Take 600 mg by mouth 2 (Two) Times a Day., Disp: , Rfl:     cetirizine (ZyrTEC Allergy) 10 MG tablet, Take 1 tablet by mouth Daily. (Patient taking differently: Take 1 tablet by mouth As Needed.), Disp: 30 tablet, Rfl: 2    fluticasone (FLONASE) 50 MCG/ACT nasal spray, 2 sprays into the nostril(s) as directed by provider Daily. (Patient taking differently: 2 sprays into the nostril(s) as directed by provider As Needed.), Disp: 9.9 mL, Rfl: 0    hydroxychloroquine (PLAQUENIL) 200 MG tablet, TAKE 1 TABLET BY MOUTH TWICE DAILY FOR RHEUMATOID ARTHRITIS, Disp: 180 tablet, Rfl: 0    levothyroxine (SYNTHROID, LEVOTHROID) 50 MCG tablet, Take 1 tablet by mouth once daily, Disp: 90 tablet, Rfl: 0    lisinopril-hydrochlorothiazide (PRINZIDE,ZESTORETIC) 20-25 MG per tablet, Take 1 tablet by mouth Daily., Disp: 90 tablet, Rfl: 3    Multiple Vitamin (MULTIVITAMIN) capsule, Take 1 capsule by mouth Daily., Disp: , Rfl:     potassium  chloride 10 MEQ CR tablet, Take one tablet on Tuesday and saturday, Disp: 30 tablet, Rfl: 01    rosuvastatin (CRESTOR) 10 MG tablet, TAKE 1 TABLET BY MOUTH ONCE DAILY AT BEDTIME, Disp: 90 tablet, Rfl: 0    aspirin 325 MG EC tablet, Take 1 tablet by mouth Every 6 (Six) Hours As Needed for Mild Pain., Disp: 30 tablet, Rfl: 2           Sylvia Almeida, APRN 4/15/2024 12:22 EDT  This note has been electronically signed

## 2024-04-15 NOTE — PATIENT INSTRUCTIONS
Bilateral carotid ultrasound  DEXA scan at [place]  If any worsening of neurosymptoms go to the emergency room at [place]

## 2024-04-16 ENCOUNTER — TELEPHONE (OUTPATIENT)
Dept: FAMILY MEDICINE CLINIC | Facility: CLINIC | Age: 84
End: 2024-04-16

## 2024-04-16 ENCOUNTER — TRANSCRIBE ORDERS (OUTPATIENT)
Dept: FAMILY MEDICINE CLINIC | Facility: CLINIC | Age: 84
End: 2024-04-16
Payer: MEDICARE

## 2024-04-16 DIAGNOSIS — G45.9 TIA (TRANSIENT ISCHEMIC ATTACK): Primary | ICD-10-CM

## 2024-04-16 DIAGNOSIS — Z13.6 ENCOUNTER FOR SCREENING FOR VASCULAR DISEASE: Primary | ICD-10-CM

## 2024-04-16 LAB
CHOLEST SERPL-MCNC: 176 MG/DL (ref 100–199)
HBA1C MFR BLD: 5.4 % (ref 4.8–5.6)
HDLC SERPL-MCNC: 81 MG/DL
LDLC SERPL CALC-MCNC: 77 MG/DL (ref 0–99)
LDLC/HDLC SERPL: 1 RATIO (ref 0–3.2)
T3 SERPL-MCNC: 99 NG/DL (ref 71–180)
T4 FREE SERPL-MCNC: 1.67 NG/DL (ref 0.82–1.77)
TRIGL SERPL-MCNC: 99 MG/DL (ref 0–149)
TSH SERPL DL<=0.005 MIU/L-ACNC: 1.44 UIU/ML (ref 0.45–4.5)
VLDLC SERPL CALC-MCNC: 18 MG/DL (ref 5–40)

## 2024-04-16 NOTE — TELEPHONE ENCOUNTER
Caller: Phillip Wang    Relationship: Self    Best call back number: [phone]    What orders are you requesting (i.e. lab or imaging): EXISTING REFERRAL FOR DEXA AND US CAROTID BILATERAL     In what timeframe would the patient need to come in:   Where will you receive your lab/imaging services: [facility]    PHILLIP SPOKE WITH HOSPITAL AND THEY HAVE NOT RECEIVED ORDERS.     REQUESTING BOTH ORDERS FAXED TO [place] AND THEN A CALL SO SHE KNOWS WHEN TO SCHEDULE

## 2024-05-17 ENCOUNTER — APPOINTMENT (OUTPATIENT)
Dept: BONE DENSITY | Facility: HOSPITAL | Age: 84
End: 2024-05-17
Payer: MEDICARE

## 2024-05-17 ENCOUNTER — HOSPITAL ENCOUNTER (OUTPATIENT)
Dept: BONE DENSITY | Facility: HOSPITAL | Age: 84
Discharge: HOME | End: 2024-05-17
Payer: MEDICARE

## 2024-05-17 ENCOUNTER — HOSPITAL ENCOUNTER (OUTPATIENT)
Dept: CARDIOLOGY | Facility: HOSPITAL | Age: 84
Discharge: HOME | End: 2024-05-17

## 2024-05-17 DIAGNOSIS — Z13.6 ENCOUNTER FOR SCREENING FOR VASCULAR DISEASE: ICD-10-CM

## 2024-05-17 DIAGNOSIS — Z78.0 POSTMENOPAUSAL: ICD-10-CM

## 2024-05-17 LAB
BH CV VAS SCREENING CAROTID CCA LEFT: 75.8 CM/SEC
BH CV VAS SCREENING CAROTID CCA RIGHT: 77 CM/SEC
BH CV VAS SCREENING CAROTID ICA LEFT: 89.5 CM/SEC
BH CV VAS SCREENING CAROTID ICA RIGHT: 82.6 CM/SEC
BH CV XLRA MEAS - MID AO DIAM: 2.04 CM
BH CV XLRA MEAS - PAD LEFT ABI PT: 0.99
BH CV XLRA MEAS - PAD LEFT ARM: 132 MMHG
BH CV XLRA MEAS - PAD LEFT LEG PT: 140 MMHG
BH CV XLRA MEAS - PAD RIGHT ABI PT: 1.09
BH CV XLRA MEAS - PAD RIGHT ARM: 142 MMHG
BH CV XLRA MEAS - PAD RIGHT LEG PT: 155 MMHG
BH CV XLRA MEAS LEFT DIST CCA EDV: -13.7 CM/SEC
BH CV XLRA MEAS LEFT DIST CCA PSV: -75.8 CM/SEC
BH CV XLRA MEAS LEFT ICA/CCA RATIO: 1.18
BH CV XLRA MEAS LEFT PROX ICA EDV: -23.6 CM/SEC
BH CV XLRA MEAS LEFT PROX ICA PSV: -89.5 CM/SEC
BH CV XLRA MEAS RIGHT DIST CCA EDV: 14.9 CM/SEC
BH CV XLRA MEAS RIGHT DIST CCA PSV: 77 CM/SEC
BH CV XLRA MEAS RIGHT ICA/CCA RATIO: 1.07
BH CV XLRA MEAS RIGHT PROX ICA EDV: -19.3 CM/SEC
BH CV XLRA MEAS RIGHT PROX ICA PSV: -82.6 CM/SEC

## 2024-05-17 PROCEDURE — 93799 UNLISTED CV SVC/PROCEDURE: CPT

## 2024-05-17 PROCEDURE — 77080 DXA BONE DENSITY AXIAL: CPT

## 2024-06-03 RX ORDER — HYDROXYCHLOROQUINE SULFATE 200 MG/1
TABLET, FILM COATED ORAL
Qty: 180 TABLET | Refills: 0 | Status: SHIPPED | OUTPATIENT
Start: 2024-06-03 | End: 2024-09-01

## 2024-06-03 RX ORDER — ROSUVASTATIN CALCIUM 10 MG/1
TABLET, COATED ORAL
Qty: 90 TABLET | Refills: 0 | Status: SHIPPED | OUTPATIENT
Start: 2024-06-03

## 2024-06-03 RX ORDER — LEVOTHYROXINE SODIUM 0.05 MG/1
TABLET ORAL
Qty: 90 TABLET | Refills: 0 | Status: SHIPPED | OUTPATIENT
Start: 2024-06-03

## 2024-08-30 ENCOUNTER — TELEPHONE (OUTPATIENT)
Dept: CARDIOLOGY | Facility: CLINIC | Age: 84
End: 2024-08-30
Payer: MEDICARE

## 2024-08-30 NOTE — TELEPHONE ENCOUNTER
Caller: Dannielle Wang    Relationship: Self    Best call back number:  [phone]      PATIENTS BP WAS RUNNING LOW SO SHE HAS NOT TAKEN IT FOR TWO DAYS, SHE HAS A HEADACHE AND FEELS NAUSEOUS THIS MORNING.  BP /41

## 2024-08-30 NOTE — TELEPHONE ENCOUNTER
Please call patient and have her decrease amlodipine to 5 mg daily and monitor blood pressure  Please have her call our office with readings following medication change    Thank you

## 2024-08-30 NOTE — TELEPHONE ENCOUNTER
Called patient, no other symptoms just has a headache patient states it goes away when she lays down and when she gets up It comes back. Verified she is taking amlodipine 10 mg daily and lisinopril-HCTZ 20-25 mg.     119/52 67  110/56 72  99/40  67

## 2024-09-04 RX ORDER — ROSUVASTATIN CALCIUM 10 MG/1
TABLET, COATED ORAL
Qty: 90 TABLET | Refills: 0 | Status: SHIPPED | OUTPATIENT
Start: 2024-09-04

## 2024-09-04 RX ORDER — HYDROXYCHLOROQUINE SULFATE 200 MG/1
TABLET, FILM COATED ORAL
Qty: 180 TABLET | Refills: 0 | Status: SHIPPED | OUTPATIENT
Start: 2024-09-04 | End: 2024-12-03

## 2024-09-04 RX ORDER — LEVOTHYROXINE SODIUM 50 UG/1
TABLET ORAL
Qty: 90 TABLET | Refills: 0 | Status: SHIPPED | OUTPATIENT
Start: 2024-09-04

## 2024-09-05 ENCOUNTER — TELEPHONE (OUTPATIENT)
Dept: CARDIOLOGY | Facility: CLINIC | Age: 84
End: 2024-09-05
Payer: MEDICARE

## 2024-09-05 RX ORDER — LISINOPRIL AND HYDROCHLOROTHIAZIDE 20; 25 MG/1; MG/1
1 TABLET ORAL DAILY
Qty: 90 TABLET | Refills: 3 | Status: CANCELLED | OUTPATIENT
Start: 2024-09-05

## 2024-09-05 RX ORDER — LISINOPRIL AND HYDROCHLOROTHIAZIDE 20; 25 MG/1; MG/1
1 TABLET ORAL DAILY
Qty: 90 TABLET | Refills: 3 | Status: SHIPPED | OUTPATIENT
Start: 2024-09-05

## 2024-09-05 NOTE — TELEPHONE ENCOUNTER
Caller: Daly Wangn MARKY    Relationship: Self    Best call back number: [phone]    Requested Prescriptions:   Requested Prescriptions     Pending Prescriptions Disp Refills    lisinopril-hydrochlorothiazide (PRINZIDE,ZESTORETIC) 20-25 MG per tablet 90 tablet 3     Sig: Take 1 tablet by mouth Daily.        Pharmacy where request should be sent: [facility] [address] [place], [place] [id number] [place], [place] [phone] FX     Last office visit with prescribing clinician: 10/4/2022   Last telemedicine visit with prescribing clinician: Visit date not found   Next office visit with prescribing clinician: Visit date not found     Additional details provided by patient: PT HAS 1 DAY LEFT OF MEDICATION    Does the patient have less than a 3 day supply:  [x] Yes  [] No    Would you like a call back once the refill request has been completed: [] Yes [x] No    If the office needs to give you a call back, can they leave a voicemail: [] Yes [x] No    Ryder Ordoñez Rep   09/05/24 12:51 EDT

## 2024-09-19 ENCOUNTER — OFFICE VISIT (OUTPATIENT)
Dept: FAMILY MEDICINE CLINIC | Facility: CLINIC | Age: 84
End: 2024-09-19
Payer: MEDICARE

## 2024-09-19 VITALS
TEMPERATURE: 97.5 F | HEART RATE: 77 BPM | OXYGEN SATURATION: 98 % | HEIGHT: 61 IN | BODY MASS INDEX: 38.51 KG/M2 | SYSTOLIC BLOOD PRESSURE: 122 MMHG | DIASTOLIC BLOOD PRESSURE: 66 MMHG | WEIGHT: 204 LBS

## 2024-09-19 DIAGNOSIS — E66.09 CLASS 2 OBESITY DUE TO EXCESS CALORIES WITHOUT SERIOUS COMORBIDITY WITH BODY MASS INDEX (BMI) OF 39.0 TO 39.9 IN ADULT: ICD-10-CM

## 2024-09-19 DIAGNOSIS — R73.09 ELEVATED GLUCOSE: ICD-10-CM

## 2024-09-19 DIAGNOSIS — E03.9 ACQUIRED HYPOTHYROIDISM: ICD-10-CM

## 2024-09-19 DIAGNOSIS — E78.2 MIXED HYPERLIPIDEMIA: Primary | ICD-10-CM

## 2024-09-19 DIAGNOSIS — R79.89 ELEVATED SERUM CREATININE: ICD-10-CM

## 2024-09-19 PROCEDURE — 99213 OFFICE O/P EST LOW 20 MIN: CPT | Performed by: NURSE PRACTITIONER

## 2024-09-19 PROCEDURE — 1126F AMNT PAIN NOTED NONE PRSNT: CPT | Performed by: NURSE PRACTITIONER

## 2024-09-19 PROCEDURE — 3074F SYST BP LT 130 MM HG: CPT | Performed by: NURSE PRACTITIONER

## 2024-09-19 PROCEDURE — 3078F DIAST BP <80 MM HG: CPT | Performed by: NURSE PRACTITIONER

## 2024-09-19 RX ORDER — AMLODIPINE BESYLATE 10 MG/1
1 TABLET ORAL DAILY
COMMUNITY
Start: 2024-09-04 | End: 2024-09-19

## 2024-09-20 LAB
ALBUMIN SERPL-MCNC: 4.4 G/DL (ref 3.7–4.7)
ALP SERPL-CCNC: 54 IU/L (ref 44–121)
ALT SERPL-CCNC: 15 IU/L (ref 0–32)
AST SERPL-CCNC: 23 IU/L (ref 0–40)
BILIRUB SERPL-MCNC: 0.4 MG/DL (ref 0–1.2)
BUN SERPL-MCNC: 18 MG/DL (ref 8–27)
BUN/CREAT SERPL: 17 (ref 12–28)
CALCIUM SERPL-MCNC: 10 MG/DL (ref 8.7–10.3)
CHLORIDE SERPL-SCNC: 100 MMOL/L (ref 96–106)
CHOLEST SERPL-MCNC: 172 MG/DL (ref 100–199)
CO2 SERPL-SCNC: 26 MMOL/L (ref 20–29)
CREAT SERPL-MCNC: 1.05 MG/DL (ref 0.57–1)
EGFRCR SERPLBLD CKD-EPI 2021: 52 ML/MIN/1.73
GLOBULIN SER CALC-MCNC: 2.4 G/DL (ref 1.5–4.5)
GLUCOSE SERPL-MCNC: 91 MG/DL (ref 70–99)
HBA1C MFR BLD: 5.3 % (ref 4.8–5.6)
HDLC SERPL-MCNC: 78 MG/DL
LDLC SERPL CALC-MCNC: 78 MG/DL (ref 0–99)
LDLC/HDLC SERPL: 1 RATIO (ref 0–3.2)
POTASSIUM SERPL-SCNC: 3.9 MMOL/L (ref 3.5–5.2)
PROT SERPL-MCNC: 6.8 G/DL (ref 6–8.5)
SODIUM SERPL-SCNC: 141 MMOL/L (ref 134–144)
T3 SERPL-MCNC: 102 NG/DL (ref 71–180)
T4 FREE SERPL-MCNC: 1.61 NG/DL (ref 0.82–1.77)
TRIGL SERPL-MCNC: 90 MG/DL (ref 0–149)
TSH SERPL DL<=0.005 MIU/L-ACNC: 1.61 UIU/ML (ref 0.45–4.5)
VLDLC SERPL CALC-MCNC: 16 MG/DL (ref 5–40)

## 2024-12-02 RX ORDER — HYDROXYCHLOROQUINE SULFATE 200 MG/1
TABLET, FILM COATED ORAL
Qty: 180 TABLET | Refills: 0 | Status: SHIPPED | OUTPATIENT
Start: 2024-12-02 | End: 2025-03-02

## 2024-12-02 RX ORDER — ROSUVASTATIN CALCIUM 10 MG/1
TABLET, COATED ORAL
Qty: 90 TABLET | Refills: 0 | Status: SHIPPED | OUTPATIENT
Start: 2024-12-02

## 2024-12-02 RX ORDER — LEVOTHYROXINE SODIUM 50 UG/1
TABLET ORAL
Qty: 90 TABLET | Refills: 0 | Status: SHIPPED | OUTPATIENT
Start: 2024-12-02

## 2025-03-01 NOTE — PROGRESS NOTES
Encounter Date:03/05/2025        Patient ID: Dannielle Wang is a 85 y.o. female.      Chief Complaint:      History of Present Illness  85-year-old with history of nonobstructive coronary artery disease, hypertension, hyperlipidemia who presents for follow-up.    Previous cardiac catheterization from 2012 which shows about 40% stenosis in the mid LAD.  Her LAD is actually very small caliber and does not reach the apex.  She has a dual LAD system with a very large diagonal which further bifurcates and supplies the majority of the anterior wall.  Her RCA has luminal irregularities.    ECG shows sinus rhythm.  Heart rate 67, , QRS 88 and QTc 480 ms    Current cardiac medications include amlodipine, lisinopril, hydrochlorothiazide, Crestor.      The following portions of the patient's history were reviewed and updated as appropriate: allergies, current medications, past family history, past medical history, past social history, past surgical history, and problem list.    Review of Systems   Constitutional: Negative for malaise/fatigue.   Cardiovascular:  Negative for chest pain, dyspnea on exertion, leg swelling and palpitations.   Respiratory:  Negative for cough and shortness of breath.    Gastrointestinal:  Negative for abdominal pain, nausea and vomiting.   Neurological:  Negative for dizziness, focal weakness, headaches, light-headedness and numbness.   All other systems reviewed and are negative.        Current Outpatient Medications:     acetaminophen (TYLENOL) 325 MG tablet, Take 1 tablet by mouth Every 6 (Six) Hours As Needed., Disp: , Rfl:     amLODIPine (NORVASC) 5 MG tablet, Take 1 tablet by mouth Daily., Disp: 90 tablet, Rfl: 3    Ascorbic Acid (VITAMIN C) 500 MG capsule, Take 500 mg by mouth Daily., Disp: , Rfl:     aspirin 325 MG EC tablet, Take 1 tablet by mouth Every 6 (Six) Hours As Needed for Mild Pain., Disp: 30 tablet, Rfl: 2    calcium carbonate-vitamin d 600-400 MG-UNIT per tablet, Take  600 mg by mouth 2 (Two) Times a Day., Disp: , Rfl:     fluticasone (FLONASE) 50 MCG/ACT nasal spray, 2 sprays into the nostril(s) as directed by provider Daily. (Patient taking differently: Administer 2 sprays into the nostril(s) as directed by provider As Needed.), Disp: 9.9 mL, Rfl: 0    levothyroxine (SYNTHROID, LEVOTHROID) 50 MCG tablet, Take 1 tablet by mouth once daily, Disp: 90 tablet, Rfl: 0    lisinopril-hydrochlorothiazide (PRINZIDE,ZESTORETIC) 20-25 MG per tablet, Take 1 tablet by mouth Daily., Disp: 90 tablet, Rfl: 3    Multiple Vitamin (MULTIVITAMIN) capsule, Take 1 capsule by mouth Daily., Disp: , Rfl:     potassium chloride 10 MEQ CR tablet, Take one tablet on Tuesday and saturday, Disp: 30 tablet, Rfl: 01    rosuvastatin (CRESTOR) 10 MG tablet, TAKE 1 TABLET BY MOUTH ONCE DAILY AT BEDTIME, Disp: 90 tablet, Rfl: 0    hydroxychloroquine (PLAQUENIL) 200 MG tablet, TAKE 1 TABLET BY MOUTH TWICE DAILY FOR RHEUMATOID ARTHRITIS, Disp: 180 tablet, Rfl: 0    Current outpatient and discharge medications have been reconciled for the patient.  Reviewed by: Maximus Vallejo MD       Allergies   Allergen Reactions    Atorvastatin Calcium Myalgia    Cephalexin Dizziness       Family History   Problem Relation Age of Onset    Hypertension Mother     Stroke Mother     Heart disease Father     No Known Problems Sister     No Known Problems Brother     No Known Problems Maternal Aunt     No Known Problems Maternal Uncle     No Known Problems Paternal Aunt     No Known Problems Paternal Uncle     No Known Problems Maternal Grandmother     No Known Problems Maternal Grandfather     No Known Problems Paternal Grandmother     No Known Problems Paternal Grandfather     No Known Problems Other     Anemia Neg Hx     Arrhythmia Neg Hx     Asthma Neg Hx     Clotting disorder Neg Hx     Fainting Neg Hx     Heart attack Neg Hx     Heart failure Neg Hx     Hyperlipidemia Neg Hx        Past Surgical History:   Procedure Laterality Date  "   JOINT REPLACEMENT      REPLACEMENT TOTAL KNEE         Past Medical History:   Diagnosis Date    Coronary artery disease     Hyperlipidemia     Hypertension     Hypothyroidism        Family History   Problem Relation Age of Onset    Hypertension Mother     Stroke Mother     Heart disease Father     No Known Problems Sister     No Known Problems Brother     No Known Problems Maternal Aunt     No Known Problems Maternal Uncle     No Known Problems Paternal Aunt     No Known Problems Paternal Uncle     No Known Problems Maternal Grandmother     No Known Problems Maternal Grandfather     No Known Problems Paternal Grandmother     No Known Problems Paternal Grandfather     No Known Problems Other     Anemia Neg Hx     Arrhythmia Neg Hx     Asthma Neg Hx     Clotting disorder Neg Hx     Fainting Neg Hx     Heart attack Neg Hx     Heart failure Neg Hx     Hyperlipidemia Neg Hx        Social History     Socioeconomic History    Marital status:    Tobacco Use    Smoking status: Never     Passive exposure: Never    Smokeless tobacco: Never   Vaping Use    Vaping status: Never Used   Substance and Sexual Activity    Alcohol use: No    Drug use: No    Sexual activity: Defer               Objective:       Physical Exam    /74 (BP Location: Left arm, Patient Position: Sitting, Cuff Size: Adult)   Ht 152.4 cm (60\")   Wt 91.2 kg (201 lb)   LMP  (LMP Unknown)   SpO2 97%   BMI 39.26 kg/m²   The patient is alert, oriented and in no distress.    Vital signs as noted above.    Head and neck revealed no carotid bruits or jugular venous distension.  No thyromegaly or lymphadenopathy is present.    Lungs clear.  No wheezing.  Breath sounds are normal bilaterally.    Heart normal first and second heart sounds.  No murmur..  No pericardial rub is present.  No gallop is present.    Abdomen soft and nontender.  No organomegaly is present.    Extremities revealed good peripheral pulses without any pedal edema.    Skin warm " and dry.    Musculoskeletal system is grossly normal.    CNS grossly normal.           Diagnosis Plan   1. Coronary artery disease involving native coronary artery of native heart without angina pectoris [I25.10]        LAB RESULTS (LAST 7 DAYS)    CBC        BMP        CMP         BNP        TROPONIN        CoAg        Creatinine Clearance  CrCl cannot be calculated (Patient's most recent lab result is older than the maximum 30 days allowed.).    ABG        Radiology  No radiology results for the last day    EKG  Procedures    Stress test      Echocardiogram      Cardiac catheterization  No results found for this or any previous visit.          Assessment and Plan       Diagnoses and all orders for this visit:    1. Coronary artery disease involving native coronary artery of native heart without angina pectoris [I25.10] (Primary)         Hypertension  She is very surprised with her blood pressure reading as previous numbers have been well-controlled  Continue amlodipine, lisinopril and hydrochlorothiazide  She must have whitecoat hypertension  Continue home blood pressure log     Hyperlipidemia  Continue with Crestor  LDL 78, HDL 78, triglyceride 90 and total cholesterol 172.    A1c is 5.3    Nonobstructive CAD  Remains chest pain-free  Continue aspirin  EKG is nonischemic    Obesity   BMI 39.  She weighs 201 pounds.  Lifestyle modifications recommended to the patient

## 2025-03-05 ENCOUNTER — OFFICE VISIT (OUTPATIENT)
Dept: CARDIOLOGY | Facility: CLINIC | Age: 85
End: 2025-03-05
Payer: MEDICARE

## 2025-03-05 VITALS
OXYGEN SATURATION: 97 % | HEIGHT: 60 IN | SYSTOLIC BLOOD PRESSURE: 176 MMHG | DIASTOLIC BLOOD PRESSURE: 74 MMHG | WEIGHT: 201 LBS | BODY MASS INDEX: 39.46 KG/M2

## 2025-03-05 DIAGNOSIS — I25.10 CORONARY ARTERY DISEASE INVOLVING NATIVE CORONARY ARTERY OF NATIVE HEART WITHOUT ANGINA PECTORIS: Primary | ICD-10-CM

## 2025-03-05 RX ORDER — ROSUVASTATIN CALCIUM 10 MG/1
TABLET, COATED ORAL
Qty: 90 TABLET | Refills: 0 | Status: SHIPPED | OUTPATIENT
Start: 2025-03-05

## 2025-03-05 RX ORDER — HYDROXYCHLOROQUINE SULFATE 200 MG/1
TABLET, FILM COATED ORAL
Qty: 180 TABLET | Refills: 0 | Status: SHIPPED | OUTPATIENT
Start: 2025-03-05 | End: 2025-06-03

## 2025-03-05 RX ORDER — LEVOTHYROXINE SODIUM 50 UG/1
TABLET ORAL
Qty: 90 TABLET | Refills: 0 | Status: SHIPPED | OUTPATIENT
Start: 2025-03-05

## 2025-03-13 NOTE — PROGRESS NOTES
Encounter Date:04/04/2023      Patient ID: Dannielle Wang is a 84 y.o. female.    Chief Complaint   Patient presents with    Follow-up     6 mo f/u          History of Present Illness  83-year-old with history of nonobstructive coronary artery disease, hypertension, hyperlipidemia who presents for follow-up.  She has been doing really well from a cardiac standpoint.  Denies any chest pain or shortness of breath.  Is feeling much better with her blood pressure being better controlled.  Her blood pressure this morning was 114/60.  Remains active.  Denies any complaints.  EKG in clinic today shows normal sinus rhythm with left anterior fascicular block and a rate of 67 bpm.  Compared to prior no change.        Previous note  I reviewed her cardiac catheterization from 2012 which shows about 40% stenosis in the mid LAD.  Her LAD is actually very small caliber and does not reach the apex.  She has a dual LAD system with a very large diagonal which further bifurcates and supplies the majority of the anterior wall.  Her RCA has luminal irregularities.    The following portions of the patient's history were reviewed and updated as appropriate: allergies, current medications, past family history, past medical history, past social history, past surgical history, and problem list.    Review of Systems   Constitutional: Negative for malaise/fatigue.   Cardiovascular:  Negative for chest pain, dyspnea on exertion, leg swelling and palpitations.   Respiratory:  Negative for cough and shortness of breath.    Gastrointestinal:  Negative for abdominal pain, nausea and vomiting.   Neurological:  Negative for dizziness, focal weakness, headaches, light-headedness and numbness.   All other systems reviewed and are negative.        Current Outpatient Medications:     acetaminophen (TYLENOL) 325 MG tablet, Take 1 tablet by mouth Every 6 (Six) Hours As Needed., Disp: , Rfl:     amLODIPine (NORVASC) 10 MG tablet, Take 1 tablet by mouth  Daily., Disp: 90 tablet, Rfl: 3    Ascorbic Acid (VITAMIN C) 500 MG capsule, Take 500 mg by mouth Daily., Disp: , Rfl:     aspirin 81 MG EC tablet, Take 1 tablet by mouth Daily., Disp: 90 tablet, Rfl: 3    calcium carbonate-vitamin d 600-400 MG-UNIT per tablet, Take 600 mg by mouth 2 (Two) Times a Day., Disp: , Rfl:     cetirizine (ZyrTEC Allergy) 10 MG tablet, Take 1 tablet by mouth Daily. (Patient taking differently: Take 1 tablet by mouth As Needed.), Disp: 30 tablet, Rfl: 2    fluticasone (FLONASE) 50 MCG/ACT nasal spray, 2 sprays into the nostril(s) as directed by provider Daily. (Patient taking differently: 2 sprays into the nostril(s) as directed by provider As Needed.), Disp: 9.9 mL, Rfl: 0    hydroxychloroquine (PLAQUENIL) 200 MG tablet, TAKE 1 TABLET BY MOUTH TWICE DAILY FOR RHEUMATOID ARTHRITIS, Disp: 180 tablet, Rfl: 0    levothyroxine (SYNTHROID, LEVOTHROID) 50 MCG tablet, Take 1 tablet by mouth once daily, Disp: 90 tablet, Rfl: 0    lisinopril-hydrochlorothiazide (PRINZIDE,ZESTORETIC) 20-25 MG per tablet, Take 1 tablet by mouth Daily., Disp: 90 tablet, Rfl: 3    Multiple Vitamin (MULTIVITAMIN) capsule, Take 1 capsule by mouth Daily., Disp: , Rfl:     potassium chloride 10 MEQ CR tablet, Take one tablet on Tuesday and saturday, Disp: 30 tablet, Rfl: 01    rosuvastatin (CRESTOR) 10 MG tablet, TAKE 1 TABLET BY MOUTH ONCE DAILY AT BEDTIME, Disp: 90 tablet, Rfl: 0    Allergies   Allergen Reactions    Atorvastatin Calcium Myalgia    Cephalexin Dizziness       Family History   Problem Relation Age of Onset    Hypertension Mother     Stroke Mother     Heart disease Father     No Known Problems Sister     No Known Problems Brother     No Known Problems Maternal Aunt     No Known Problems Maternal Uncle     No Known Problems Paternal Aunt     No Known Problems Paternal Uncle     No Known Problems Maternal Grandmother     No Known Problems Maternal Grandfather     No Known Problems Paternal Grandmother     No  encouraged him to follow a low carbohydrate diabetic diet.  Patient verbalized understanding.  I advised patient at this time he is not cleared to resume work and I advised him to follow-up with his PCP to obtain clearance prior to resuming work.  Patient verbalized understanding.  Home today.  I discussed with patient's nurse         MEDICATIONS ON DISCHARGE:       Medication List        START taking these medications      amLODIPine 10 MG tablet  Commonly known as: NORVASC  Take 1 tablet by mouth daily  Start taking on: March 14, 2025     aspirin 81 MG chewable tablet  Take 1 tablet by mouth daily  Start taking on: March 14, 2025     atorvastatin 40 MG tablet  Commonly known as: LIPITOR  Take 1 tablet by mouth nightly     carvedilol 12.5 MG tablet  Commonly known as: COREG  Take 1 tablet by mouth 2 times daily (with meals)            CHANGE how you take these medications      losartan 100 MG tablet  Commonly known as: COZAAR  Take 1 tablet by mouth daily  Start taking on: March 14, 2025  What changed:   medication strength  how much to take  Another medication with the same name was removed. Continue taking this medication, and follow the directions you see here.            CONTINUE taking these medications      Artificial Tears 1 % ophthalmic solution  Generic drug: carboxymethylcellulose  Place 1 drop into both eyes 3 times daily     glucose monitoring kit  1 kit by Does not apply route as needed (Before meals and at bedtime)     Lancets 28G Misc  1 each by Does not apply route as needed (Before meals and at bedtime)     lidocaine-Hydrocort 3-0.5 % cream  Place around the anus every 4 hours as needed (pain)            STOP taking these medications      blood glucose test strips     erythromycin 5 MG/GM ophthalmic ointment  Commonly known as: ROMYCIN     hydroCHLOROthiazide 25 MG tablet  Commonly known as: HYDRODIURIL     ibuprofen 600 MG tablet  Commonly known as: ADVIL;MOTRIN     ketorolac 10 MG  "Known Problems Paternal Grandfather     No Known Problems Other     Anemia Neg Hx     Arrhythmia Neg Hx     Asthma Neg Hx     Clotting disorder Neg Hx     Fainting Neg Hx     Heart attack Neg Hx     Heart failure Neg Hx     Hyperlipidemia Neg Hx        Past Surgical History:   Procedure Laterality Date    JOINT REPLACEMENT      REPLACEMENT TOTAL KNEE         Past Medical History:   Diagnosis Date    Coronary artery disease     Hyperlipidemia     Hypertension     Hypothyroidism        Social History     Socioeconomic History    Marital status:    Tobacco Use    Smoking status: Never     Passive exposure: Never    Smokeless tobacco: Never   Vaping Use    Vaping status: Never Used   Substance and Sexual Activity    Alcohol use: No    Drug use: No    Sexual activity: Defer         Procedures      Objective:       Physical Exam    /60 (BP Location: Right arm, Patient Position: Sitting, Cuff Size: Adult)   Pulse 67   Ht 153.7 cm (60.51\")   Wt 94.8 kg (209 lb)   LMP  (LMP Unknown)   SpO2 97%   BMI 40.13 kg/m²   The patient is alert, oriented and in no distress.  Obese    Vital signs as noted above.    Head and neck revealed no carotid bruits or jugular venous distension.  No thyromegaly or lymphadenopathy is present.    Lungs clear.  No wheezing.  Breath sounds are normal bilaterally.    Heart normal first and second heart sounds.  No murmur..  No pericardial rub is present.  No gallop is present.    Abdomen soft and nontender.  No organomegaly is present.    Extremities revealed good peripheral pulses without any pedal edema.    Skin warm and dry.    Musculoskeletal system is grossly normal.    CNS grossly normal.           Diagnosis Plan   1. Primary hypertension        2. Coronary artery disease involving native coronary artery of native heart without angina pectoris [I25.10]        3. Mixed hyperlipidemia          LAB RESULTS (LAST 7 DAYS)    CBC        BMP        CMP         BNP        TROPONIN    "     CoAg        Creatinine Clearance  Estimated Creatinine Clearance: 36.6 mL/min (A) (by C-G formula based on SCr of 1.17 mg/dL (H)).    ABG        Radiology  No radiology results for the last day         Assessment and Plan       Diagnoses and all orders for this visit:    1. Primary hypertension (Primary)    2. Coronary artery disease involving native coronary artery of native heart without angina pectoris [I25.10]    3. Mixed hyperlipidemia         Hypertension  Well-controlled  Continue amlodipine to 10 mg  Continue lisinopril HCTZ to 20/25    Hyperlipidemia  Continue with Crestor    Nonobstructive CAD  Remains chest pain-free  Continue aspirin        Monserrat Tatum MD

## 2025-03-19 ENCOUNTER — OFFICE VISIT (OUTPATIENT)
Dept: FAMILY MEDICINE CLINIC | Facility: CLINIC | Age: 85
End: 2025-03-19
Payer: MEDICARE

## 2025-03-19 VITALS
BODY MASS INDEX: 39.5 KG/M2 | SYSTOLIC BLOOD PRESSURE: 134 MMHG | HEIGHT: 60 IN | TEMPERATURE: 97.8 F | DIASTOLIC BLOOD PRESSURE: 72 MMHG | OXYGEN SATURATION: 98 % | WEIGHT: 201.2 LBS | HEART RATE: 78 BPM

## 2025-03-19 DIAGNOSIS — N18.2 STAGE 2 CHRONIC KIDNEY DISEASE: ICD-10-CM

## 2025-03-19 DIAGNOSIS — E78.2 MIXED HYPERLIPIDEMIA: Primary | ICD-10-CM

## 2025-03-19 DIAGNOSIS — E66.09 CLASS 2 OBESITY DUE TO EXCESS CALORIES WITHOUT SERIOUS COMORBIDITY WITH BODY MASS INDEX (BMI) OF 39.0 TO 39.9 IN ADULT: ICD-10-CM

## 2025-03-19 DIAGNOSIS — E66.812 CLASS 2 OBESITY DUE TO EXCESS CALORIES WITHOUT SERIOUS COMORBIDITY WITH BODY MASS INDEX (BMI) OF 39.0 TO 39.9 IN ADULT: ICD-10-CM

## 2025-03-19 DIAGNOSIS — R79.89 ELEVATED SERUM CREATININE: ICD-10-CM

## 2025-03-19 DIAGNOSIS — E03.9 ACQUIRED HYPOTHYROIDISM: ICD-10-CM

## 2025-03-19 DIAGNOSIS — Z00.00 PREVENTATIVE HEALTH CARE: ICD-10-CM

## 2025-03-19 NOTE — PATIENT INSTRUCTIONS
Fasting blood work  Wear hearing aids both for them  Several hours a day to get used to it  Diet and exercise  Follow-up 6 months fasting

## 2025-03-19 NOTE — PROGRESS NOTES
"    Dannielle Wang is a 85 y.o. female.     History of Present Illness  85-year-old white female with history of CAD/hypertension, hyperlipidemia, hypothyroidism, TIA, rheumatoid arthritis and hysterectomy who comes in today for 6-month follow-up visit fasting blood work    Blood pressure 134/72 heart rate 78 she denies any chest pain, dyspnea, tachycardia or dizziness    Patient did recently get hearing aids but states she does not like the way they still want to ears that she has not been wearing them very much and she is only been wearing 1 at a time.  I encouraged her to wear both urinate at the same time for several hours each day till she gets used to them    She has no new complaints.  Labs were pretty stable last time with exception of mildly elevated creatinine which more likely is coming from the Plaquenil.  Weight is 201 with a BMI of 39.3.  She has had 2 COVID vaccines and is up-to-date on eye exam.  Next DEXA scan is due in May 2026.  No longer does mammograms or colon cancer due to her age          Fasting blood work  Wear hearing aids both for them  Several hours a day to get used to it  Diet and exercise  Follow-up 6 months fasting       The following portions of the patient's history were reviewed and updated as appropriate: allergies, current medications, past family history, past medical history, past social history, past surgical history, and problem list.    Vitals:    03/19/25 1349   BP: 134/72   BP Location: Left arm   Patient Position: Sitting   Cuff Size: Large Adult   Pulse: 78   Temp: 97.8 °F (36.6 °C)   TempSrc: Temporal   SpO2: 98%   Weight: 91.3 kg (201 lb 3.2 oz)   Height: 152.4 cm (60\")       Past Medical History:   Diagnosis Date    Coronary artery disease     Hyperlipidemia     Hypertension     Hypothyroidism      Past Surgical History:   Procedure Laterality Date    JOINT REPLACEMENT      REPLACEMENT TOTAL KNEE       Family History   Problem Relation Age of Onset    Hypertension " Mother     Stroke Mother     Heart disease Father     No Known Problems Sister     No Known Problems Brother     No Known Problems Maternal Aunt     No Known Problems Maternal Uncle     No Known Problems Paternal Aunt     No Known Problems Paternal Uncle     No Known Problems Maternal Grandmother     No Known Problems Maternal Grandfather     No Known Problems Paternal Grandmother     No Known Problems Paternal Grandfather     No Known Problems Other     Anemia Neg Hx     Arrhythmia Neg Hx     Asthma Neg Hx     Clotting disorder Neg Hx     Fainting Neg Hx     Heart attack Neg Hx     Heart failure Neg Hx     Hyperlipidemia Neg Hx      Immunization History   Administered Date(s) Administered    COVID-19 (MODERNA) 1st,2nd,3rd Dose Monovalent 01/15/2021, 02/12/2021    Fluad Quad 65+ 12/19/2019, 12/17/2020    Fluzone High-Dose 65+YRS 09/26/2016, 10/16/2018    Fluzone High-Dose 65+yrs 09/13/2022    PPD Test 06/02/2004    Pneumococcal Conjugate 20-Valent (PCV20) 09/13/2022    Pneumococcal Polysaccharide (PPSV23) 12/17/2020       Office Visit on 09/19/2024   Component Date Value Ref Range Status    Glucose 09/19/2024 91  70 - 99 mg/dL Final    BUN 09/19/2024 18  8 - 27 mg/dL Final    Creatinine 09/19/2024 1.05 (H)  0.57 - 1.00 mg/dL Final    EGFR Result 09/19/2024 52 (L)  >59 mL/min/1.73 Final    BUN/Creatinine Ratio 09/19/2024 17  12 - 28 Final    Sodium 09/19/2024 141  134 - 144 mmol/L Final    Potassium 09/19/2024 3.9  3.5 - 5.2 mmol/L Final    Chloride 09/19/2024 100  96 - 106 mmol/L Final    Total CO2 09/19/2024 26  20 - 29 mmol/L Final    Calcium 09/19/2024 10.0  8.7 - 10.3 mg/dL Final    Total Protein 09/19/2024 6.8  6.0 - 8.5 g/dL Final    Albumin 09/19/2024 4.4  3.7 - 4.7 g/dL Final    Globulin 09/19/2024 2.4  1.5 - 4.5 g/dL Final    Total Bilirubin 09/19/2024 0.4  0.0 - 1.2 mg/dL Final    Alkaline Phosphatase 09/19/2024 54  44 - 121 IU/L Final    AST (SGOT) 09/19/2024 23  0 - 40 IU/L Final    ALT (SGPT) 09/19/2024  15  0 - 32 IU/L Final    Total Cholesterol 09/19/2024 172  100 - 199 mg/dL Final    Triglycerides 09/19/2024 90  0 - 149 mg/dL Final    HDL Cholesterol 09/19/2024 78  >39 mg/dL Final    VLDL Cholesterol Norberto 09/19/2024 16  5 - 40 mg/dL Final    LDL Chol Calc ([facility]) 09/19/2024 78  0 - 99 mg/dL Final    LDL/HDL RATIO 09/19/2024 1.0  0.0 - 3.2 ratio Final    Comment:                                     LDL/HDL Ratio                                              Men  Women                                1/2 Avg.Risk  1.0    1.5                                    Avg.Risk  3.6    3.2                                 2X Avg.Risk  6.2    5.0                                 3X Avg.Risk  8.0    6.1      Hemoglobin A1C 09/19/2024 5.3  4.8 - 5.6 % Final    Comment:          Prediabetes: 5.7 - 6.4           Diabetes: >6.4           Glycemic control for adults with diabetes: <7.0      TSH 09/19/2024 1.610  0.450 - 4.500 uIU/mL Final    Free T4 09/19/2024 1.61  0.82 - 1.77 ng/dL Final    T3, Total 09/19/2024 102  71 - 180 ng/dL Final         Review of Systems   Constitutional: Negative.    HENT: Negative.     Respiratory: Negative.     Cardiovascular: Negative.    Gastrointestinal: Negative.    Genitourinary: Negative.    Musculoskeletal: Negative.    Skin: Negative.    Neurological: Negative.    Psychiatric/Behavioral: Negative.         Objective   Physical Exam  Constitutional:       Appearance: Normal appearance.   HENT:      Head: Normocephalic.   Cardiovascular:      Rate and Rhythm: Normal rate and regular rhythm.      Pulses: Normal pulses.      Heart sounds: Normal heart sounds.   Pulmonary:      Effort: Pulmonary effort is normal.      Breath sounds: Normal breath sounds.   Abdominal:      General: Bowel sounds are normal.   Musculoskeletal:         General: Normal range of motion.   Skin:     General: Skin is warm.   Neurological:      General: No focal deficit present.      Mental Status: She is alert and oriented to  person, place, and time.   Psychiatric:         Mood and Affect: Mood normal.         Behavior: Behavior normal.         Procedures    Assessment & Plan   Diagnoses and all orders for this visit:    1. Mixed hyperlipidemia (Primary)  -     Lipid Panel With LDL / HDL Ratio    2. Acquired hypothyroidism  -     TSH+Free T4; Future  -     T3; Future    3. Stage 2 chronic kidney disease  -     Comprehensive Metabolic Panel; Future    4. Preventative health care  -     CBC & Differential; Future  -     Hemoglobin A1c; Future           Current Outpatient Medications:     acetaminophen (TYLENOL) 325 MG tablet, Take 1 tablet by mouth Every 6 (Six) Hours As Needed., Disp: , Rfl:     amLODIPine (NORVASC) 5 MG tablet, Take 1 tablet by mouth Daily., Disp: 90 tablet, Rfl: 3    Ascorbic Acid (VITAMIN C) 500 MG capsule, Take 500 mg by mouth Daily., Disp: , Rfl:     aspirin 325 MG EC tablet, Take 1 tablet by mouth Every 6 (Six) Hours As Needed for Mild Pain., Disp: 30 tablet, Rfl: 2    calcium carbonate-vitamin d 600-400 MG-UNIT per tablet, Take 600 mg by mouth 2 (Two) Times a Day., Disp: , Rfl:     fluticasone (FLONASE) 50 MCG/ACT nasal spray, 2 sprays into the nostril(s) as directed by provider Daily. (Patient taking differently: Administer 2 sprays into the nostril(s) as directed by provider As Needed.), Disp: 9.9 mL, Rfl: 0    hydroxychloroquine (PLAQUENIL) 200 MG tablet, TAKE 1 TABLET BY MOUTH TWICE DAILY FOR RHEUMATOID ARTHRITIS, Disp: 180 tablet, Rfl: 0    levothyroxine (SYNTHROID, LEVOTHROID) 50 MCG tablet, Take 1 tablet by mouth once daily, Disp: 90 tablet, Rfl: 0    lisinopril-hydrochlorothiazide (PRINZIDE,ZESTORETIC) 20-25 MG per tablet, Take 1 tablet by mouth Daily., Disp: 90 tablet, Rfl: 3    Multiple Vitamin (MULTIVITAMIN) capsule, Take 1 capsule by mouth Daily., Disp: , Rfl:     potassium chloride 10 MEQ CR tablet, Take one tablet on Tuesday and saturday, Disp: 30 tablet, Rfl: 01    rosuvastatin (CRESTOR) 10 MG  tablet, TAKE 1 TABLET BY MOUTH ONCE DAILY AT BEDTIME, Disp: 90 tablet, Rfl: 0           Sylvia Almeida, APRN 3/19/2025 14:03 EDT  This note has been electronically signed

## 2025-03-21 LAB
ALBUMIN SERPL-MCNC: 4.2 G/DL (ref 3.7–4.7)
ALP SERPL-CCNC: 56 IU/L (ref 44–121)
ALT SERPL-CCNC: 18 IU/L (ref 0–32)
AST SERPL-CCNC: 22 IU/L (ref 0–40)
BASOPHILS # BLD AUTO: 0.1 X10E3/UL (ref 0–0.2)
BASOPHILS NFR BLD AUTO: 1 %
BILIRUB SERPL-MCNC: 0.4 MG/DL (ref 0–1.2)
BUN SERPL-MCNC: 18 MG/DL (ref 8–27)
BUN/CREAT SERPL: 17 (ref 12–28)
CALCIUM SERPL-MCNC: 9.6 MG/DL (ref 8.7–10.3)
CHLORIDE SERPL-SCNC: 100 MMOL/L (ref 96–106)
CHOLEST SERPL-MCNC: 167 MG/DL (ref 100–199)
CO2 SERPL-SCNC: 27 MMOL/L (ref 20–29)
CREAT SERPL-MCNC: 1.06 MG/DL (ref 0.57–1)
EGFRCR SERPLBLD CKD-EPI 2021: 51 ML/MIN/1.73
EOSINOPHIL # BLD AUTO: 0.4 X10E3/UL (ref 0–0.4)
EOSINOPHIL NFR BLD AUTO: 5 %
ERYTHROCYTE [DISTWIDTH] IN BLOOD BY AUTOMATED COUNT: 13.2 % (ref 11.7–15.4)
GLOBULIN SER CALC-MCNC: 2.5 G/DL (ref 1.5–4.5)
GLUCOSE SERPL-MCNC: 97 MG/DL (ref 70–99)
HBA1C MFR BLD: 5.3 % (ref 4.8–5.6)
HCT VFR BLD AUTO: 43.4 % (ref 34–46.6)
HDLC SERPL-MCNC: 69 MG/DL
HGB BLD-MCNC: 14.5 G/DL (ref 11.1–15.9)
IMM GRANULOCYTES # BLD AUTO: 0 X10E3/UL (ref 0–0.1)
IMM GRANULOCYTES NFR BLD AUTO: 0 %
LDLC SERPL CALC-MCNC: 80 MG/DL (ref 0–99)
LDLC/HDLC SERPL: 1.2 RATIO (ref 0–3.2)
LYMPHOCYTES # BLD AUTO: 2.3 X10E3/UL (ref 0.7–3.1)
LYMPHOCYTES NFR BLD AUTO: 34 %
MCH RBC QN AUTO: 30.9 PG (ref 26.6–33)
MCHC RBC AUTO-ENTMCNC: 33.4 G/DL (ref 31.5–35.7)
MCV RBC AUTO: 92 FL (ref 79–97)
MONOCYTES # BLD AUTO: 0.5 X10E3/UL (ref 0.1–0.9)
MONOCYTES NFR BLD AUTO: 7 %
NEUTROPHILS # BLD AUTO: 3.5 X10E3/UL (ref 1.4–7)
NEUTROPHILS NFR BLD AUTO: 53 %
PLATELET # BLD AUTO: 236 X10E3/UL (ref 150–450)
POTASSIUM SERPL-SCNC: 3.8 MMOL/L (ref 3.5–5.2)
PROT SERPL-MCNC: 6.7 G/DL (ref 6–8.5)
RBC # BLD AUTO: 4.7 X10E6/UL (ref 3.77–5.28)
SODIUM SERPL-SCNC: 139 MMOL/L (ref 134–144)
T3 SERPL-MCNC: 98 NG/DL (ref 71–180)
T4 FREE SERPL-MCNC: 1.5 NG/DL (ref 0.82–1.77)
TRIGL SERPL-MCNC: 102 MG/DL (ref 0–149)
TSH SERPL DL<=0.005 MIU/L-ACNC: 1.7 UIU/ML (ref 0.45–4.5)
VLDLC SERPL CALC-MCNC: 18 MG/DL (ref 5–40)
WBC # BLD AUTO: 6.6 X10E3/UL (ref 3.4–10.8)

## 2025-04-18 ENCOUNTER — HOSPITAL ENCOUNTER (OUTPATIENT)
Facility: HOSPITAL | Age: 85
Setting detail: OBSERVATION
Discharge: HOME | End: 2025-04-19
Attending: EMERGENCY MEDICINE | Admitting: EMERGENCY MEDICINE
Payer: MEDICARE

## 2025-04-18 ENCOUNTER — APPOINTMENT (OUTPATIENT)
Dept: GENERAL RADIOLOGY | Facility: HOSPITAL | Age: 85
End: 2025-04-18
Payer: MEDICARE

## 2025-04-18 DIAGNOSIS — R05.2 SUBACUTE COUGH: ICD-10-CM

## 2025-04-18 DIAGNOSIS — R53.1 WEAKNESS: ICD-10-CM

## 2025-04-18 DIAGNOSIS — R07.9 CHEST PAIN, UNSPECIFIED TYPE: Primary | ICD-10-CM

## 2025-04-18 LAB
ALBUMIN SERPL-MCNC: 4.1 G/DL (ref 3.5–5.2)
ALBUMIN/GLOB SERPL: 1.7 G/DL
ALP SERPL-CCNC: 56 U/L (ref 39–117)
ALT SERPL W P-5'-P-CCNC: 17 U/L (ref 1–33)
ANION GAP SERPL CALCULATED.3IONS-SCNC: 11.2 MMOL/L (ref 5–15)
AST SERPL-CCNC: 27 U/L (ref 1–32)
B PARAPERT DNA SPEC QL NAA+PROBE: NOT DETECTED
B PERT DNA SPEC QL NAA+PROBE: NOT DETECTED
BASOPHILS # BLD AUTO: 0.07 10*3/MM3 (ref 0–0.2)
BASOPHILS NFR BLD AUTO: 1.1 % (ref 0–1.5)
BILIRUB SERPL-MCNC: 0.3 MG/DL (ref 0–1.2)
BUN SERPL-MCNC: 24 MG/DL (ref 8–23)
BUN/CREAT SERPL: 18.9 (ref 7–25)
C PNEUM DNA NPH QL NAA+NON-PROBE: NOT DETECTED
CALCIUM SPEC-SCNC: 9.1 MG/DL (ref 8.6–10.5)
CHLORIDE SERPL-SCNC: 101 MMOL/L (ref 98–107)
CO2 SERPL-SCNC: 24.8 MMOL/L (ref 22–29)
CREAT SERPL-MCNC: 1.27 MG/DL (ref 0.57–1)
DEPRECATED RDW RBC AUTO: 45.6 FL (ref 37–54)
EGFRCR SERPLBLD CKD-EPI 2021: 41.3 ML/MIN/1.73
EOSINOPHIL # BLD AUTO: 0.33 10*3/MM3 (ref 0–0.4)
EOSINOPHIL NFR BLD AUTO: 5.2 % (ref 0.3–6.2)
ERYTHROCYTE [DISTWIDTH] IN BLOOD BY AUTOMATED COUNT: 13.5 % (ref 12.3–15.4)
FLUAV SUBTYP SPEC NAA+PROBE: NOT DETECTED
FLUBV RNA ISLT QL NAA+PROBE: NOT DETECTED
GLOBULIN UR ELPH-MCNC: 2.4 GM/DL
GLUCOSE SERPL-MCNC: 102 MG/DL (ref 65–99)
HADV DNA SPEC NAA+PROBE: NOT DETECTED
HCOV 229E RNA SPEC QL NAA+PROBE: NOT DETECTED
HCOV HKU1 RNA SPEC QL NAA+PROBE: NOT DETECTED
HCOV NL63 RNA SPEC QL NAA+PROBE: NOT DETECTED
HCOV OC43 RNA SPEC QL NAA+PROBE: NOT DETECTED
HCT VFR BLD AUTO: 39.3 % (ref 34–46.6)
HGB BLD-MCNC: 12.9 G/DL (ref 12–15.9)
HMPV RNA NPH QL NAA+NON-PROBE: NOT DETECTED
HOLD SPECIMEN: NORMAL
HPIV1 RNA ISLT QL NAA+PROBE: NOT DETECTED
HPIV2 RNA SPEC QL NAA+PROBE: NOT DETECTED
HPIV3 RNA NPH QL NAA+PROBE: NOT DETECTED
HPIV4 P GENE NPH QL NAA+PROBE: NOT DETECTED
IMM GRANULOCYTES # BLD AUTO: 0.01 10*3/MM3 (ref 0–0.05)
IMM GRANULOCYTES NFR BLD AUTO: 0.2 % (ref 0–0.5)
LYMPHOCYTES # BLD AUTO: 2.05 10*3/MM3 (ref 0.7–3.1)
LYMPHOCYTES NFR BLD AUTO: 32.4 % (ref 19.6–45.3)
M PNEUMO IGG SER IA-ACNC: NOT DETECTED
MAGNESIUM SERPL-MCNC: 2.2 MG/DL (ref 1.6–2.4)
MCH RBC QN AUTO: 29.9 PG (ref 26.6–33)
MCHC RBC AUTO-ENTMCNC: 32.8 G/DL (ref 31.5–35.7)
MCV RBC AUTO: 91.2 FL (ref 79–97)
MONOCYTES # BLD AUTO: 0.57 10*3/MM3 (ref 0.1–0.9)
MONOCYTES NFR BLD AUTO: 9 % (ref 5–12)
NEUTROPHILS NFR BLD AUTO: 3.3 10*3/MM3 (ref 1.7–7)
NEUTROPHILS NFR BLD AUTO: 52.1 % (ref 42.7–76)
NRBC BLD AUTO-RTO: 0 /100 WBC (ref 0–0.2)
NT-PROBNP SERPL-MCNC: 75.1 PG/ML (ref 0–1800)
PLATELET # BLD AUTO: 195 10*3/MM3 (ref 140–450)
PMV BLD AUTO: 9.8 FL (ref 6–12)
POTASSIUM SERPL-SCNC: 3.4 MMOL/L (ref 3.5–5.2)
PROT SERPL-MCNC: 6.5 G/DL (ref 6–8.5)
RBC # BLD AUTO: 4.31 10*6/MM3 (ref 3.77–5.28)
RHINOVIRUS RNA SPEC NAA+PROBE: NOT DETECTED
RSV RNA NPH QL NAA+NON-PROBE: NOT DETECTED
SARS-COV-2 RNA RESP QL NAA+PROBE: NOT DETECTED
SODIUM SERPL-SCNC: 137 MMOL/L (ref 136–145)
TROPONIN T SERPL HS-MCNC: 19 NG/L
TSH SERPL DL<=0.05 MIU/L-ACNC: 1.26 UIU/ML (ref 0.27–4.2)
WBC NRBC COR # BLD AUTO: 6.33 10*3/MM3 (ref 3.4–10.8)
WHOLE BLOOD HOLD COAG: NORMAL

## 2025-04-18 PROCEDURE — 96375 TX/PRO/DX INJ NEW DRUG ADDON: CPT

## 2025-04-18 PROCEDURE — 96374 THER/PROPH/DIAG INJ IV PUSH: CPT

## 2025-04-18 PROCEDURE — 80053 COMPREHEN METABOLIC PANEL: CPT

## 2025-04-18 PROCEDURE — 99285 EMERGENCY DEPT VISIT HI MDM: CPT

## 2025-04-18 PROCEDURE — 84443 ASSAY THYROID STIM HORMONE: CPT

## 2025-04-18 PROCEDURE — 93005 ELECTROCARDIOGRAM TRACING: CPT | Performed by: EMERGENCY MEDICINE

## 2025-04-18 PROCEDURE — 83880 ASSAY OF NATRIURETIC PEPTIDE: CPT

## 2025-04-18 PROCEDURE — 0202U NFCT DS 22 TRGT SARS-COV-2: CPT

## 2025-04-18 PROCEDURE — 83735 ASSAY OF MAGNESIUM: CPT

## 2025-04-18 PROCEDURE — 25010000002 ONDANSETRON PER 1 MG

## 2025-04-18 PROCEDURE — 36415 COLL VENOUS BLD VENIPUNCTURE: CPT

## 2025-04-18 PROCEDURE — 71046 X-RAY EXAM CHEST 2 VIEWS: CPT

## 2025-04-18 PROCEDURE — 93005 ELECTROCARDIOGRAM TRACING: CPT

## 2025-04-18 PROCEDURE — 84484 ASSAY OF TROPONIN QUANT: CPT

## 2025-04-18 PROCEDURE — 25010000002 MORPHINE PER 10 MG

## 2025-04-18 PROCEDURE — 85025 COMPLETE CBC W/AUTO DIFF WBC: CPT

## 2025-04-18 RX ORDER — ONDANSETRON 2 MG/ML
4 INJECTION INTRAMUSCULAR; INTRAVENOUS ONCE
Status: COMPLETED | OUTPATIENT
Start: 2025-04-18 | End: 2025-04-18

## 2025-04-18 RX ORDER — MORPHINE SULFATE 2 MG/ML
2 INJECTION, SOLUTION INTRAMUSCULAR; INTRAVENOUS ONCE
Status: COMPLETED | OUTPATIENT
Start: 2025-04-18 | End: 2025-04-18

## 2025-04-18 RX ORDER — SODIUM CHLORIDE 0.9 % (FLUSH) 0.9 %
10 SYRINGE (ML) INJECTION AS NEEDED
Status: DISCONTINUED | OUTPATIENT
Start: 2025-04-18 | End: 2025-04-19 | Stop reason: HOSPADM

## 2025-04-18 RX ADMIN — MORPHINE SULFATE 2 MG: 2 INJECTION, SOLUTION INTRAMUSCULAR; INTRAVENOUS at 22:51

## 2025-04-18 RX ADMIN — ONDANSETRON 4 MG: 2 INJECTION, SOLUTION INTRAMUSCULAR; INTRAVENOUS at 22:51

## 2025-04-19 ENCOUNTER — APPOINTMENT (OUTPATIENT)
Dept: CT IMAGING | Facility: HOSPITAL | Age: 85
End: 2025-04-19
Payer: MEDICARE

## 2025-04-19 ENCOUNTER — READMISSION MANAGEMENT (OUTPATIENT)
Dept: CALL CENTER | Facility: HOSPITAL | Age: 85
End: 2025-04-19
Payer: MEDICARE

## 2025-04-19 VITALS
DIASTOLIC BLOOD PRESSURE: 51 MMHG | BODY MASS INDEX: 38.46 KG/M2 | HEIGHT: 61 IN | SYSTOLIC BLOOD PRESSURE: 123 MMHG | HEART RATE: 70 BPM | TEMPERATURE: 98 F | RESPIRATION RATE: 19 BRPM | WEIGHT: 203.71 LBS | OXYGEN SATURATION: 94 %

## 2025-04-19 PROBLEM — R07.9 CHEST PAIN: Status: ACTIVE | Noted: 2025-04-19

## 2025-04-19 LAB
ALBUMIN SERPL-MCNC: 3.9 G/DL (ref 3.5–5.2)
ALBUMIN/GLOB SERPL: 1.6 G/DL
ALP SERPL-CCNC: 49 U/L (ref 39–117)
ALT SERPL W P-5'-P-CCNC: 16 U/L (ref 1–33)
ANION GAP SERPL CALCULATED.3IONS-SCNC: 10 MMOL/L (ref 5–15)
AST SERPL-CCNC: 24 U/L (ref 1–32)
BACTERIA UR QL AUTO: NORMAL /HPF
BASOPHILS # BLD AUTO: 0.07 10*3/MM3 (ref 0–0.2)
BASOPHILS NFR BLD AUTO: 1.1 % (ref 0–1.5)
BILIRUB SERPL-MCNC: 0.4 MG/DL (ref 0–1.2)
BILIRUB UR QL STRIP: NEGATIVE
BUN SERPL-MCNC: 18 MG/DL (ref 8–23)
BUN/CREAT SERPL: 16.8 (ref 7–25)
CALCIUM SPEC-SCNC: 9.3 MG/DL (ref 8.6–10.5)
CHLORIDE SERPL-SCNC: 105 MMOL/L (ref 98–107)
CLARITY UR: CLEAR
CO2 SERPL-SCNC: 27 MMOL/L (ref 22–29)
COLOR UR: YELLOW
CREAT SERPL-MCNC: 1.07 MG/DL (ref 0.57–1)
D DIMER PPP FEU-MCNC: 1.05 MCGFEU/ML (ref 0–0.86)
DEPRECATED RDW RBC AUTO: 46.6 FL (ref 37–54)
EGFRCR SERPLBLD CKD-EPI 2021: 50.7 ML/MIN/1.73
EOSINOPHIL # BLD AUTO: 0.32 10*3/MM3 (ref 0–0.4)
EOSINOPHIL NFR BLD AUTO: 5.1 % (ref 0.3–6.2)
ERYTHROCYTE [DISTWIDTH] IN BLOOD BY AUTOMATED COUNT: 13.8 % (ref 12.3–15.4)
GEN 5 1HR TROPONIN T REFLEX: 18 NG/L
GLOBULIN UR ELPH-MCNC: 2.4 GM/DL
GLUCOSE BLDC GLUCOMTR-MCNC: 91 MG/DL (ref 70–105)
GLUCOSE SERPL-MCNC: 85 MG/DL (ref 65–99)
GLUCOSE UR STRIP-MCNC: NEGATIVE MG/DL
HCT VFR BLD AUTO: 35.6 % (ref 34–46.6)
HGB BLD-MCNC: 11.7 G/DL (ref 12–15.9)
HGB UR QL STRIP.AUTO: NEGATIVE
HYALINE CASTS UR QL AUTO: NORMAL /LPF
IMM GRANULOCYTES # BLD AUTO: 0.02 10*3/MM3 (ref 0–0.05)
IMM GRANULOCYTES NFR BLD AUTO: 0.3 % (ref 0–0.5)
KETONES UR QL STRIP: NEGATIVE
LEUKOCYTE ESTERASE UR QL STRIP.AUTO: ABNORMAL
LYMPHOCYTES # BLD AUTO: 2.5 10*3/MM3 (ref 0.7–3.1)
LYMPHOCYTES NFR BLD AUTO: 39.6 % (ref 19.6–45.3)
MCH RBC QN AUTO: 30.2 PG (ref 26.6–33)
MCHC RBC AUTO-ENTMCNC: 32.9 G/DL (ref 31.5–35.7)
MCV RBC AUTO: 92 FL (ref 79–97)
MONOCYTES # BLD AUTO: 0.56 10*3/MM3 (ref 0.1–0.9)
MONOCYTES NFR BLD AUTO: 8.9 % (ref 5–12)
NEUTROPHILS NFR BLD AUTO: 2.84 10*3/MM3 (ref 1.7–7)
NEUTROPHILS NFR BLD AUTO: 45 % (ref 42.7–76)
NITRITE UR QL STRIP: NEGATIVE
NRBC BLD AUTO-RTO: 0 /100 WBC (ref 0–0.2)
PH UR STRIP.AUTO: 7 [PH] (ref 5–8)
PLATELET # BLD AUTO: 182 10*3/MM3 (ref 140–450)
PMV BLD AUTO: 10.4 FL (ref 6–12)
POTASSIUM SERPL-SCNC: 3.6 MMOL/L (ref 3.5–5.2)
PROT SERPL-MCNC: 6.3 G/DL (ref 6–8.5)
PROT UR QL STRIP: NEGATIVE
RBC # BLD AUTO: 3.87 10*6/MM3 (ref 3.77–5.28)
RBC # UR STRIP: NORMAL /HPF
REF LAB TEST METHOD: NORMAL
SODIUM SERPL-SCNC: 142 MMOL/L (ref 136–145)
SP GR UR STRIP: 1.01 (ref 1–1.03)
SQUAMOUS #/AREA URNS HPF: NORMAL /HPF
TROPONIN T % DELTA: -5
TROPONIN T NUMERIC DELTA: -1 NG/L
UROBILINOGEN UR QL STRIP: ABNORMAL
WBC # UR STRIP: NORMAL /HPF
WBC NRBC COR # BLD AUTO: 6.31 10*3/MM3 (ref 3.4–10.8)

## 2025-04-19 PROCEDURE — G0378 HOSPITAL OBSERVATION PER HR: HCPCS

## 2025-04-19 PROCEDURE — 25510000001 IOPAMIDOL PER 1 ML: Performed by: EMERGENCY MEDICINE

## 2025-04-19 PROCEDURE — 81001 URINALYSIS AUTO W/SCOPE: CPT

## 2025-04-19 PROCEDURE — 85379 FIBRIN DEGRADATION QUANT: CPT | Performed by: NURSE PRACTITIONER

## 2025-04-19 PROCEDURE — 80053 COMPREHEN METABOLIC PANEL: CPT

## 2025-04-19 PROCEDURE — 99222 1ST HOSP IP/OBS MODERATE 55: CPT | Performed by: NURSE PRACTITIONER

## 2025-04-19 PROCEDURE — 85025 COMPLETE CBC W/AUTO DIFF WBC: CPT

## 2025-04-19 PROCEDURE — 71275 CT ANGIOGRAPHY CHEST: CPT

## 2025-04-19 PROCEDURE — 82948 REAGENT STRIP/BLOOD GLUCOSE: CPT

## 2025-04-19 RX ORDER — HYDROCODONE BITARTRATE AND ACETAMINOPHEN 5; 325 MG/1; MG/1
1 TABLET ORAL EVERY 6 HOURS PRN
Refills: 0 | Status: DISCONTINUED | OUTPATIENT
Start: 2025-04-19 | End: 2025-04-19 | Stop reason: HOSPADM

## 2025-04-19 RX ORDER — IOPAMIDOL 755 MG/ML
100 INJECTION, SOLUTION INTRAVASCULAR
Status: COMPLETED | OUTPATIENT
Start: 2025-04-19 | End: 2025-04-19

## 2025-04-19 RX ORDER — BISACODYL 10 MG
10 SUPPOSITORY, RECTAL RECTAL DAILY PRN
Status: DISCONTINUED | OUTPATIENT
Start: 2025-04-19 | End: 2025-04-19 | Stop reason: HOSPADM

## 2025-04-19 RX ORDER — BISACODYL 5 MG/1
5 TABLET, DELAYED RELEASE ORAL DAILY PRN
Status: DISCONTINUED | OUTPATIENT
Start: 2025-04-19 | End: 2025-04-19 | Stop reason: HOSPADM

## 2025-04-19 RX ORDER — AMOXICILLIN 250 MG
2 CAPSULE ORAL 2 TIMES DAILY PRN
Status: DISCONTINUED | OUTPATIENT
Start: 2025-04-19 | End: 2025-04-19 | Stop reason: HOSPADM

## 2025-04-19 RX ORDER — LIDOCAINE 4 G/G
1 PATCH TOPICAL
Status: DISCONTINUED | OUTPATIENT
Start: 2025-04-19 | End: 2025-04-19 | Stop reason: HOSPADM

## 2025-04-19 RX ORDER — ACETAMINOPHEN 160 MG/5ML
650 SOLUTION ORAL EVERY 4 HOURS PRN
Status: DISCONTINUED | OUTPATIENT
Start: 2025-04-19 | End: 2025-04-19 | Stop reason: HOSPADM

## 2025-04-19 RX ORDER — POLYETHYLENE GLYCOL 3350 17 G/17G
17 POWDER, FOR SOLUTION ORAL DAILY PRN
Status: DISCONTINUED | OUTPATIENT
Start: 2025-04-19 | End: 2025-04-19 | Stop reason: HOSPADM

## 2025-04-19 RX ORDER — HYDROCHLOROTHIAZIDE 25 MG/1
25 TABLET ORAL
Status: DISCONTINUED | OUTPATIENT
Start: 2025-04-19 | End: 2025-04-19 | Stop reason: HOSPADM

## 2025-04-19 RX ORDER — NALOXONE HCL 0.4 MG/ML
0.4 VIAL (ML) INJECTION
Status: DISCONTINUED | OUTPATIENT
Start: 2025-04-19 | End: 2025-04-19 | Stop reason: HOSPADM

## 2025-04-19 RX ORDER — LEVOTHYROXINE SODIUM 50 UG/1
50 TABLET ORAL
Status: DISCONTINUED | OUTPATIENT
Start: 2025-04-19 | End: 2025-04-19 | Stop reason: HOSPADM

## 2025-04-19 RX ORDER — SENNOSIDES 8.6 MG
325 CAPSULE ORAL EVERY 6 HOURS PRN
Status: DISCONTINUED | OUTPATIENT
Start: 2025-04-19 | End: 2025-04-19 | Stop reason: HOSPADM

## 2025-04-19 RX ORDER — NITROGLYCERIN 0.4 MG/1
0.4 TABLET SUBLINGUAL
Status: DISCONTINUED | OUTPATIENT
Start: 2025-04-19 | End: 2025-04-19 | Stop reason: HOSPADM

## 2025-04-19 RX ORDER — POTASSIUM CHLORIDE 1500 MG/1
40 TABLET, EXTENDED RELEASE ORAL EVERY 4 HOURS
Status: COMPLETED | OUTPATIENT
Start: 2025-04-19 | End: 2025-04-19

## 2025-04-19 RX ORDER — AMLODIPINE BESYLATE 5 MG/1
5 TABLET ORAL DAILY
Status: DISCONTINUED | OUTPATIENT
Start: 2025-04-19 | End: 2025-04-19 | Stop reason: HOSPADM

## 2025-04-19 RX ORDER — SODIUM CHLORIDE 0.9 % (FLUSH) 0.9 %
10 SYRINGE (ML) INJECTION AS NEEDED
Status: DISCONTINUED | OUTPATIENT
Start: 2025-04-19 | End: 2025-04-19 | Stop reason: HOSPADM

## 2025-04-19 RX ORDER — ACETAMINOPHEN 325 MG/1
650 TABLET ORAL EVERY 4 HOURS PRN
Status: DISCONTINUED | OUTPATIENT
Start: 2025-04-19 | End: 2025-04-19 | Stop reason: HOSPADM

## 2025-04-19 RX ORDER — HYDROMORPHONE HYDROCHLORIDE 1 MG/ML
0.5 INJECTION, SOLUTION INTRAMUSCULAR; INTRAVENOUS; SUBCUTANEOUS
Refills: 0 | Status: DISCONTINUED | OUTPATIENT
Start: 2025-04-19 | End: 2025-04-19 | Stop reason: HOSPADM

## 2025-04-19 RX ORDER — HYDROXYCHLOROQUINE SULFATE 200 MG/1
200 TABLET, FILM COATED ORAL EVERY 12 HOURS SCHEDULED
Status: DISCONTINUED | OUTPATIENT
Start: 2025-04-19 | End: 2025-04-19 | Stop reason: HOSPADM

## 2025-04-19 RX ORDER — POTASSIUM CHLORIDE 1500 MG/1
20 TABLET, EXTENDED RELEASE ORAL ONCE
Status: COMPLETED | OUTPATIENT
Start: 2025-04-19 | End: 2025-04-19

## 2025-04-19 RX ORDER — ASPIRIN 81 MG/1
81 TABLET ORAL DAILY
Status: DISCONTINUED | OUTPATIENT
Start: 2025-04-19 | End: 2025-04-19 | Stop reason: HOSPADM

## 2025-04-19 RX ORDER — ROSUVASTATIN CALCIUM 10 MG/1
10 TABLET, COATED ORAL NIGHTLY
Status: DISCONTINUED | OUTPATIENT
Start: 2025-04-19 | End: 2025-04-19 | Stop reason: HOSPADM

## 2025-04-19 RX ORDER — LISINOPRIL 20 MG/1
20 TABLET ORAL
Status: DISCONTINUED | OUTPATIENT
Start: 2025-04-19 | End: 2025-04-19 | Stop reason: HOSPADM

## 2025-04-19 RX ORDER — SODIUM CHLORIDE 0.9 % (FLUSH) 0.9 %
10 SYRINGE (ML) INJECTION EVERY 12 HOURS SCHEDULED
Status: DISCONTINUED | OUTPATIENT
Start: 2025-04-19 | End: 2025-04-19 | Stop reason: HOSPADM

## 2025-04-19 RX ORDER — SODIUM CHLORIDE 9 MG/ML
40 INJECTION, SOLUTION INTRAVENOUS AS NEEDED
Status: DISCONTINUED | OUTPATIENT
Start: 2025-04-19 | End: 2025-04-19 | Stop reason: HOSPADM

## 2025-04-19 RX ORDER — ACETAMINOPHEN 650 MG/1
650 SUPPOSITORY RECTAL EVERY 4 HOURS PRN
Status: DISCONTINUED | OUTPATIENT
Start: 2025-04-19 | End: 2025-04-19 | Stop reason: HOSPADM

## 2025-04-19 RX ADMIN — HYDROXYCHLOROQUINE SULFATE 200 MG: 200 TABLET ORAL at 08:34

## 2025-04-19 RX ADMIN — Medication 10 ML: at 08:36

## 2025-04-19 RX ADMIN — POTASSIUM CHLORIDE 40 MEQ: 1500 TABLET, EXTENDED RELEASE ORAL at 10:33

## 2025-04-19 RX ADMIN — LIDOCAINE PAIN RELIEF 1 PATCH: 560 PATCH TOPICAL at 16:34

## 2025-04-19 RX ADMIN — IOPAMIDOL 100 ML: 755 INJECTION, SOLUTION INTRAVENOUS at 12:50

## 2025-04-19 RX ADMIN — LEVOTHYROXINE SODIUM 50 MCG: 50 TABLET ORAL at 08:34

## 2025-04-19 RX ADMIN — LISINOPRIL 20 MG: 20 TABLET ORAL at 08:35

## 2025-04-19 RX ADMIN — POTASSIUM CHLORIDE 20 MEQ: 1500 TABLET, EXTENDED RELEASE ORAL at 04:26

## 2025-04-19 RX ADMIN — POTASSIUM CHLORIDE 40 MEQ: 1500 TABLET, EXTENDED RELEASE ORAL at 14:57

## 2025-04-19 RX ADMIN — HYDROCHLOROTHIAZIDE 25 MG: 25 TABLET ORAL at 08:35

## 2025-04-19 RX ADMIN — ASPIRIN 81 MG: 81 TABLET, COATED ORAL at 14:57

## 2025-04-19 NOTE — PLAN OF CARE
Goal Outcome Evaluation:           Progress: no change     Pt to be discharge to home. Pt to  meds at preferred pharmacy. Pt to follow up with PCP with 5 to 7 days. Discharge instructions given, understanding verified. Pt to be transported via wheelchair down to the lobby accompanied by family member.

## 2025-04-19 NOTE — PLAN OF CARE
Problem: Adult Inpatient Plan of Care  Goal: Plan of Care Review  Outcome: Progressing  Goal: Patient-Specific Goal (Individualized)  Outcome: Progressing  Goal: Absence of Hospital-Acquired Illness or Injury  Outcome: Progressing  Intervention: Identify and Manage Fall Risk  Recent Flowsheet Documentation  Taken 4/19/2025 0425 by Leta Marrero RN  Safety Promotion/Fall Prevention:   safety round/check completed   room organization consistent   clutter free environment maintained   assistive device/personal items within reach  Taken 4/19/2025 0324 by Leta Marrero RN  Safety Promotion/Fall Prevention:   safety round/check completed   room organization consistent   clutter free environment maintained   assistive device/personal items within reach  Intervention: Prevent Skin Injury  Recent Flowsheet Documentation  Taken 4/19/2025 0324 by Leta Marrero RN  Body Position: position changed independently  Skin Protection: transparent dressing maintained  Intervention: Prevent and Manage VTE (Venous Thromboembolism) Risk  Recent Flowsheet Documentation  Taken 4/19/2025 0324 by Leta Marrero RN  VTE Prevention/Management: SCDs (sequential compression devices) off  Intervention: Prevent Infection  Recent Flowsheet Documentation  Taken 4/19/2025 0425 by Leta Marrero RN  Infection Prevention:   single patient room provided   rest/sleep promoted   hand hygiene promoted   environmental surveillance performed  Taken 4/19/2025 0324 by Leta Marrero RN  Infection Prevention:   single patient room provided   rest/sleep promoted   hand hygiene promoted   environmental surveillance performed  Goal: Optimal Comfort and Wellbeing  Outcome: Progressing  Intervention: Provide Person-Centered Care  Recent Flowsheet Documentation  Taken 4/19/2025 0324 by Leta Marrero RN  Trust Relationship/Rapport:   care explained   choices provided   emotional support provided   empathic listening provided   questions answered   questions encouraged    reassurance provided   thoughts/feelings acknowledged  Goal: Readiness for Transition of Care  Outcome: Progressing  Intervention: Mutually Develop Transition Plan  Recent Flowsheet Documentation  Taken 4/19/2025 0327 by Leta Marrero, RN  Equipment Currently Used at Home:   cane, quad tip   shower chair  Taken 4/19/2025 0325 by Leta Marrero, RN  Transportation Anticipated: family or friend will provide  Patient/Family Anticipated Services at Transition: none  Patient/Family Anticipates Transition to: home   Goal Outcome Evaluation:

## 2025-04-19 NOTE — OUTREACH NOTE
Prep Survey      Flowsheet Row Responses   [facility] patient discharged from? [facility]   Is LACE score < 7 ? No   Eligibility [facility]   Date of Admission 04/19/25   Date of Discharge 04/19/25   Discharge Disposition Home or Self Care   Discharge diagnosis Chest pain   Does the patient have one of the following disease processes/diagnoses(primary or secondary)? Other   Does the patient have Home health ordered? No   Is there a DME ordered? No   Prep survey completed? Yes            Gema SCHMITT - Registered Nurse           Suturegard Intro: Intraoperative tissue expansion was performed, utilizing the SUTUREGARD device, in order to reduce wound tension.

## 2025-04-19 NOTE — PLAN OF CARE
Problem: Adult Inpatient Plan of Care  Goal: Plan of Care Review  Outcome: Progressing  Flowsheets (Taken 4/19/2025 0953)  Progress: no change  Goal: Absence of Hospital-Acquired Illness or Injury  Intervention: Identify and Manage Fall Risk  Recent Flowsheet Documentation  Taken 4/19/2025 0815 by Melissa De La Vega RN  Safety Promotion/Fall Prevention:   activity supervised   assistive device/personal items within reach   clutter free environment maintained   fall prevention program maintained   lighting adjusted   nonskid shoes/slippers when out of bed   room organization consistent   safety round/check completed  Intervention: Prevent Skin Injury  Recent Flowsheet Documentation  Taken 4/19/2025 0815 by Melissa De La Vega RN  Body Position: position changed independently  Skin Protection: transparent dressing maintained  Intervention: Prevent and Manage VTE (Venous Thromboembolism) Risk  Recent Flowsheet Documentation  Taken 4/19/2025 0815 by Melissa De La Vega RN  VTE Prevention/Management:   bilateral   SCDs (sequential compression devices) off  Intervention: Prevent Infection  Recent Flowsheet Documentation  Taken 4/19/2025 0815 by Melissa De La Vega RN  Infection Prevention:   hand hygiene promoted   rest/sleep promoted   single patient room provided  Goal: Optimal Comfort and Wellbeing  Intervention: Provide Person-Centered Care  Recent Flowsheet Documentation  Taken 4/19/2025 0815 by Melissa De La Vega RN  Trust Relationship/Rapport:   care explained   choices provided   emotional support provided   empathic listening provided   questions answered     Problem: Chest Pain  Goal: Resolution of Chest Pain Symptoms  Outcome: Progressing   Goal Outcome Evaluation:           Progress: no change   Pt AO x 4, RA, VSS, NSR on the monitor. Denies pain, N/V. D-dimer elevated, CT angio chest done, negative for PE. Lidocaine patch placed on right chest. Call bell within reach, family at bedside. Continuity of care provided.

## 2025-04-19 NOTE — DISCHARGE SUMMARY
[facility]    Sylvia AlmeidaROHAN    CHIEF COMPLAINT:     Chest pain    HISTORY OF PRESENT ILLNESS:    Obtained from ED provider HPI on 4/18/2025:  Context: Patient is an 85-year-old  female with a history of coronary artery disease, hypertension, hyperlipidemia, and hypothyroidism who presents to the emergency room with complaints of chest pain.  Patient states that chest pain began this morning upon waking and is located in the right side of her chest, radiating up toward the neck but not reaching the neck.  States that she has felt weak and fatigued all day today.  Patient also complains of additional chest pain with sneezing and coughing.  Patient did have an episode of subjective numbness to the left side of the face 3 weeks prior which lasted approximately 1 day; this has fully resolved at this time.  Patient denies any other complaints not addressed above; no headache, no abdominal pain no nausea, vomiting, diarrhea.    04/19/25:  Patient confirms the HPI noted above reporting some chest discomfort which began the previous morning while at rest.  Pain is located on the right side of her chest described as a stabbing type pain made worse with deep breathing and coughing.  She has had a nonproductive cough but denies any nausea, vomiting, peripheral edema, fever or recent sick contacts.  Patient does not smoke or drink alcohol.  She is compliant with all of her outpatient medical therapies.            Past Medical History:   Diagnosis Date    Coronary artery disease     Hyperlipidemia     Hypertension     Hypothyroidism      Past Surgical History:   Procedure Laterality Date    JOINT REPLACEMENT      REPLACEMENT TOTAL KNEE       Family History   Problem Relation Age of Onset    Hypertension Mother     Stroke Mother     Heart disease Father     No Known Problems Sister     No Known Problems Brother     No Known Problems Maternal Aunt     No Known Problems Maternal Uncle     No  Known Problems Paternal Aunt     No Known Problems Paternal Uncle     No Known Problems Maternal Grandmother     No Known Problems Maternal Grandfather     No Known Problems Paternal Grandmother     No Known Problems Paternal Grandfather     No Known Problems Other     Anemia Neg Hx     Arrhythmia Neg Hx     Asthma Neg Hx     Clotting disorder Neg Hx     Fainting Neg Hx     Heart attack Neg Hx     Heart failure Neg Hx     Hyperlipidemia Neg Hx      Social History     Tobacco Use    Smoking status: Never     Passive exposure: Never    Smokeless tobacco: Never   Vaping Use    Vaping status: Never Used   Substance Use Topics    Alcohol use: No    Drug use: No     Medications Prior to Admission   Medication Sig Dispense Refill Last Dose/Taking    acetaminophen (TYLENOL) 325 MG tablet Take 1 tablet by mouth Every 6 (Six) Hours As Needed.   Past Month    amLODIPine (NORVASC) 5 MG tablet Take 1 tablet by mouth Daily. 90 tablet 3 4/18/2025 Morning    Ascorbic Acid (VITAMIN C) 500 MG capsule Take 500 mg by mouth Daily.   4/18/2025 Morning    aspirin 325 MG EC tablet Take 1 tablet by mouth Every 6 (Six) Hours As Needed for Mild Pain. 30 tablet 2 4/18/2025 Morning    calcium carbonate-vitamin d 600-400 MG-UNIT per tablet Take 600 mg by mouth 2 (Two) Times a Day.   4/18/2025 Evening    hydroxychloroquine (PLAQUENIL) 200 MG tablet TAKE 1 TABLET BY MOUTH TWICE DAILY FOR RHEUMATOID ARTHRITIS 180 tablet 0 4/18/2025 Morning    levothyroxine (SYNTHROID, LEVOTHROID) 50 MCG tablet Take 1 tablet by mouth once daily 90 tablet 0 4/18/2025 Morning    lisinopril-hydrochlorothiazide (PRINZIDE,ZESTORETIC) 20-25 MG per tablet Take 1 tablet by mouth Daily. 90 tablet 3 4/18/2025 Morning    Multiple Vitamin (MULTIVITAMIN) capsule Take 1 capsule by mouth Daily.   4/18/2025 Morning    rosuvastatin (CRESTOR) 10 MG tablet TAKE 1 TABLET BY MOUTH ONCE DAILY AT BEDTIME 90 tablet 0 4/18/2025 Evening    fluticasone (FLONASE) 50 MCG/ACT nasal spray 2  sprays into the nostril(s) as directed by provider Daily. (Patient taking differently: Administer 2 sprays into the nostril(s) as directed by provider As Needed.) 9.9 mL 0     potassium chloride 10 MEQ CR tablet Take one tablet on Tuesday and saturday 30 tablet 01      Allergies:  Atorvastatin calcium and Cephalexin    Immunization History   Administered Date(s) Administered    COVID-19 (MODERNA) 1st,2nd,3rd Dose Monovalent 01/15/2021, 02/12/2021    Fluad Quad 65+ 12/19/2019, 12/17/2020    Fluzone High-Dose 65+YRS 09/26/2016, 10/16/2018    Fluzone High-Dose 65+yrs 09/13/2022    PPD Test 06/02/2004    Pneumococcal Conjugate 20-Valent (PCV20) 09/13/2022    Pneumococcal Polysaccharide (PPSV23) 12/17/2020           REVIEW OF SYSTEMS:    Review of Systems   Constitutional: Negative.   HENT: Negative.     Eyes: Negative.    Cardiovascular:  Positive for chest pain.   Respiratory:  Positive for cough.    Skin: Negative.    Musculoskeletal: Negative.    Gastrointestinal: Negative.    Genitourinary: Negative.    Neurological: Negative.    Psychiatric/Behavioral: Negative.         Vital Signs  Temp:  [97.7 °F (36.5 °C)-98.4 °F (36.9 °C)] 97.7 °F (36.5 °C)  Heart Rate:  [65-82] 71  Resp:  [17-19] 18  BP: (119-151)/(47-72) 144/59          Physical Exam:  Physical Exam  Vitals reviewed.   Constitutional:       General: She is not in acute distress.     Appearance: Normal appearance. She is obese. She is not ill-appearing, toxic-appearing or diaphoretic.   HENT:      Head: Normocephalic.      Right Ear: External ear normal.      Left Ear: External ear normal.      Nose: Nose normal.      Mouth/Throat:      Mouth: Mucous membranes are moist.   Eyes:      Extraocular Movements: Extraocular movements intact.   Cardiovascular:      Rate and Rhythm: Normal rate and regular rhythm.      Pulses: Normal pulses.   Pulmonary:      Effort: Pulmonary effort is normal.      Breath sounds: Normal breath sounds.   Abdominal:      General: Bowel  sounds are normal.      Palpations: Abdomen is soft.   Musculoskeletal:      Cervical back: Normal range of motion.      Right lower leg: No edema.      Left lower leg: No edema.   Skin:     General: Skin is warm and dry.      Capillary Refill: Capillary refill takes less than 2 seconds.   Neurological:      General: No focal deficit present.      Mental Status: She is alert.   Psychiatric:         Mood and Affect: Mood normal.         Behavior: Behavior normal.         Thought Content: Thought content normal.         Judgment: Judgment normal.         Emotional Behavior:   Normal   Debilities:  None  Results Review:    I reviewed the patient's new clinical results.  Lab Results (most recent)       Procedure Component Value Units Date/Time    Urinalysis, Microscopic Only - Urine, Clean Catch [[id number]] Collected: 04/19/25 0928    Specimen: Urine, Clean Catch Updated: 04/19/25 0945     RBC, UA 0-2 /HPF      WBC, UA 0-2 /HPF      Comment: Urine culture not indicated.        Bacteria, UA None Seen /HPF      Squamous Epithelial Cells, UA 0-2 /HPF      Hyaline Casts, UA None Seen /LPF      Methodology Automated Microscopy    Urinalysis With Culture If Indicated - Urine, Clean Catch [[id number]]  (Abnormal) Collected: 04/19/25 0928    Specimen: Urine, Clean Catch Updated: 04/19/25 0943     Color, UA Yellow     Appearance, UA Clear     pH, UA 7.0     Specific Gravity, UA 1.008     Glucose, UA Negative     Ketones, UA Negative     Bilirubin, UA Negative     Blood, UA Negative     Protein, UA Negative     Leuk Esterase, UA Trace     Nitrite, UA Negative     Urobilinogen, UA 0.2 E.U./dL    Narrative:      In absence of clinical symptoms, the presence of pyuria, bacteria, and/or nitrites on the urinalysis result does not correlate with infection.    Comprehensive Metabolic Panel [[id number]]  (Abnormal) Collected: 04/19/25 0845    Specimen: Blood from Arm, Right Updated: 04/19/25 0930     Glucose 85 mg/dL      BUN 18 mg/dL       Creatinine 1.07 mg/dL      Sodium 142 mmol/L      Potassium 3.6 mmol/L      Chloride 105 mmol/L      CO2 27.0 mmol/L      Calcium 9.3 mg/dL      Total Protein 6.3 g/dL      Albumin 3.9 g/dL      ALT (SGPT) 16 U/L      AST (SGOT) 24 U/L      Alkaline Phosphatase 49 U/L      Total Bilirubin 0.4 mg/dL      Globulin 2.4 gm/dL      A/G Ratio 1.6 g/dL      BUN/Creatinine Ratio 16.8     Anion Gap 10.0 mmol/L      eGFR 50.7 mL/min/1.73     Narrative:      GFR Categories in Chronic Kidney Disease (CKD)      GFR Category          GFR (mL/min/1.73)    Interpretation  G1                     90 or greater         Normal or high (1)  G2                      60-89                Mild decrease (1)  G3a                   45-59                Mild to moderate decrease  G3b                   30-44                Moderate to severe decrease  G4                    15-29                Severe decrease  G5                    14 or less           Kidney failure          (1)In the absence of evidence of kidney disease, neither GFR category G1 or G2 fulfill the criteria for CKD.    eGFR calculation 2021 CKD-EPI creatinine equation, which does not include race as a factor    POC Glucose Once [[id number]]  (Normal) Collected: 04/19/25 0725    Specimen: Blood Updated: 04/19/25 0726     Glucose 91 mg/dL      Comment: Serial Number: [id number]:  [id number]       CBC Auto Differential [[id number]]  (Abnormal) Collected: 04/19/25 0431    Specimen: Blood from Arm, Right Updated: 04/19/25 0614     WBC 6.31 10*3/mm3      RBC 3.87 10*6/mm3      Hemoglobin 11.7 g/dL      Hematocrit 35.6 %      MCV 92.0 fL      MCH 30.2 pg      MCHC 32.9 g/dL      RDW 13.8 %      RDW-SD 46.6 fl      MPV 10.4 fL      Platelets 182 10*3/mm3      Neutrophil % 45.0 %      Lymphocyte % 39.6 %      Monocyte % 8.9 %      Eosinophil % 5.1 %      Basophil % 1.1 %      Immature Grans % 0.3 %      Neutrophils, Absolute 2.84 10*3/mm3      Lymphocytes, Absolute 2.50 10*3/mm3       Monocytes, Absolute 0.56 10*3/mm3      Eosinophils, Absolute 0.32 10*3/mm3      Basophils, Absolute 0.07 10*3/mm3      Immature Grans, Absolute 0.02 10*3/mm3      nRBC 0.0 /100 WBC     High Sensitivity Troponin T 1Hr [[id number]]  (Abnormal) Collected: 04/18/25 2347    Specimen: Blood Updated: 04/19/25 0009     HS Troponin T 18 ng/L      Troponin T Numeric Delta -1 ng/L      Troponin T % Delta -5    Narrative:      High Sensitive Troponin T Reference Range:  <14.0 ng/L- Negative Female for AMI  <22.0 ng/L- Negative Male for AMI  >=14 - Abnormal Female indicating possible myocardial injury.  >=22 - Abnormal Male indicating possible myocardial injury.   Clinicians would have to utilize clinical acumen, EKG, Troponin, and serial changes to determine if it is an Acute Myocardial Infarction or myocardial injury due to an underlying chronic condition.         Respiratory Panel PCR w/COVID-19(SARS-CoV-2) [facility] In-House, NP Swab in UT/VTM, 2 HR TAT - Swab, Nasopharynx [[id number]]  (Normal) Collected: 04/18/25 2215    Specimen: Swab from Nasopharynx Updated: 04/18/25 2308     ADENOVIRUS, PCR Not Detected     Coronavirus 229E Not Detected     Coronavirus HKU1 Not Detected     Coronavirus NL63 Not Detected     Coronavirus OC43 Not Detected     COVID19 Not Detected     Human Metapneumovirus Not Detected     Human Rhinovirus/Enterovirus Not Detected     Influenza A PCR Not Detected     Influenza B PCR Not Detected     Parainfluenza Virus 1 Not Detected     Parainfluenza Virus 2 Not Detected     Parainfluenza Virus 3 Not Detected     Parainfluenza Virus 4 Not Detected     RSV, PCR Not Detected     Bordetella pertussis pcr Not Detected     Bordetella parapertussis PCR Not Detected     Chlamydophila pneumoniae PCR Not Detected     Mycoplasma pneumo by PCR Not Detected    Narrative:      In the setting of a positive respiratory panel with a viral infection PLUS a negative procalcitonin without other underlying  concern for bacterial infection, consider observing off antibiotics or discontinuation of antibiotics and continue supportive care. If the respiratory panel is positive for atypical bacterial infection (Bordetella pertussis, Chlamydophila pneumoniae, or Mycoplasma pneumoniae), consider antibiotic de-escalation to target atypical bacterial infection.    Comprehensive Metabolic Panel [[id number]]  (Abnormal) Collected: 04/18/25 2215    Specimen: Blood from Arm, Right Updated: 04/18/25 2257     Glucose 102 mg/dL      BUN 24 mg/dL      Creatinine 1.27 mg/dL      Sodium 137 mmol/L      Potassium 3.4 mmol/L      Chloride 101 mmol/L      CO2 24.8 mmol/L      Calcium 9.1 mg/dL      Total Protein 6.5 g/dL      Albumin 4.1 g/dL      ALT (SGPT) 17 U/L      AST (SGOT) 27 U/L      Alkaline Phosphatase 56 U/L      Total Bilirubin 0.3 mg/dL      Globulin 2.4 gm/dL      A/G Ratio 1.7 g/dL      BUN/Creatinine Ratio 18.9     Anion Gap 11.2 mmol/L      eGFR 41.3 mL/min/1.73     Narrative:      GFR Categories in Chronic Kidney Disease (CKD)      GFR Category          GFR (mL/min/1.73)    Interpretation  G1                     90 or greater         Normal or high (1)  G2                      60-89                Mild decrease (1)  G3a                   45-59                Mild to moderate decrease  G3b                   30-44                Moderate to severe decrease  G4                    15-29                Severe decrease  G5                    14 or less           Kidney failure          (1)In the absence of evidence of kidney disease, neither GFR category G1 or G2 fulfill the criteria for CKD.    eGFR calculation 2021 CKD-EPI creatinine equation, which does not include race as a factor    BNP [[id number]]  (Normal) Collected: 04/18/25 2215    Specimen: Blood from Arm, Right Updated: 04/18/25 2257     proBNP 75.1 pg/mL     Narrative:      This assay is used as an aid in the diagnosis of individuals suspected of having heart  failure. It can be used as an aid in the diagnosis of acute decompensated heart failure (ADHF) in patients presenting with signs and symptoms of ADHF to the emergency department (ED). In addition, NT-proBNP of <300 pg/mL indicates ADHF is not likely.    Age Range Result Interpretation  NT-proBNP Concentration (pg/mL:      <50             Positive            >450                   Gray                 300-450                    Negative             <300    50-75           Positive            >900                  Gray                300-900                  Negative            <300      >75             Positive            >1800                  Gray                300-1800                  Negative            <300    High Sensitivity Troponin T [[id number]]  (Abnormal) Collected: 04/18/25 2215    Specimen: Blood from Arm, Right Updated: 04/18/25 2257     HS Troponin T 19 ng/L     Narrative:      High Sensitive Troponin T Reference Range:  <14.0 ng/L- Negative Female for AMI  <22.0 ng/L- Negative Male for AMI  >=14 - Abnormal Female indicating possible myocardial injury.  >=22 - Abnormal Male indicating possible myocardial injury.   Clinicians would have to utilize clinical acumen, EKG, Troponin, and serial changes to determine if it is an Acute Myocardial Infarction or myocardial injury due to an underlying chronic condition.         TSH Rfx On Abnormal To Free T4 [[id number]]  (Normal) Collected: 04/18/25 2215    Specimen: Blood from Arm, Right Updated: 04/18/25 2257     TSH 1.260 uIU/mL     Magnesium [[id number]]  (Normal) Collected: 04/18/25 2215    Specimen: Blood from Arm, Right Updated: 04/18/25 2257     Magnesium 2.2 mg/dL     Extra Tubes [[id number]] Collected: 04/18/25 2215    Specimen: Blood from Arm, Right Updated: 04/18/25 2231    Narrative:      The following orders were created for panel order Extra Tubes.  Procedure                               Abnormality         Status                     ---------                                -----------         ------                     Gold Top - SST[[id number]]                                   Final result               Light Blue Top[[id number]]                                   Final result                 Please view results for these tests on the individual orders.    Gold Top - SST [[id number]] Collected: 04/18/25 2215    Specimen: Blood from Arm, Right Updated: 04/18/25 2231     Extra Tube Hold for add-ons.     Comment: Auto resulted.       Light Blue Top [[id number]] Collected: 04/18/25 2215    Specimen: Blood from Arm, Right Updated: 04/18/25 2231     Extra Tube Hold for add-ons.     Comment: Auto resulted       CBC & Differential [[id number]]  (Normal) Collected: 04/18/25 2215    Specimen: Blood from Arm, Right Updated: 04/18/25 2222    Narrative:      The following orders were created for panel order CBC & Differential.  Procedure                               Abnormality         Status                     ---------                               -----------         ------                     CBC Auto Differential[[id number]]        Normal              Final result                 Please view results for these tests on the individual orders.    CBC Auto Differential [[id number]]  (Normal) Collected: 04/18/25 2215    Specimen: Blood from Arm, Right Updated: 04/18/25 2222     WBC 6.33 10*3/mm3      RBC 4.31 10*6/mm3      Hemoglobin 12.9 g/dL      Hematocrit 39.3 %      MCV 91.2 fL      MCH 29.9 pg      MCHC 32.8 g/dL      RDW 13.5 %      RDW-SD 45.6 fl      MPV 9.8 fL      Platelets 195 10*3/mm3      Neutrophil % 52.1 %      Lymphocyte % 32.4 %      Monocyte % 9.0 %      Eosinophil % 5.2 %      Basophil % 1.1 %      Immature Grans % 0.2 %      Neutrophils, Absolute 3.30 10*3/mm3      Lymphocytes, Absolute 2.05 10*3/mm3      Monocytes, Absolute 0.57 10*3/mm3      Eosinophils, Absolute 0.33 10*3/mm3      Basophils, Absolute 0.07 10*3/mm3      Immature Grans, Absolute 0.01  10*3/mm3      nRBC 0.0 /100 WBC             Imaging Results (Most Recent)       Procedure Component Value Units Date/Time    XR Chest 2 View [[id number]] Collected: 04/18/25 2213     Updated: 04/18/25 2217    Narrative:      XR CHEST 2 VW    Date of Exam: 4/18/2025 10:00 PM EDT    Indication: CHF/COPD Protocol  CHF/COPD Protocol    Comparison: Chest radiograph 2/27/2022.    Findings:  Cardiomediastinal silhouette is unchanged. No focal consolidation. No pleural effusion or pneumothorax. Thoracic spondylosis. Cholelithiasis.      Impression:      Impression:  No acute cardiopulmonary findings.       Electronically Signed: Petr Song MD    4/18/2025 10:15 PM EDT    Workstation ID: [id number]          reviewed    ECG/EMG Results (most recent)       Procedure Component Value Units Date/Time    ECG 12 Lead Chest Pain [[id number]] Collected: 04/18/25 2104     Updated: 04/18/25 2106     QT Interval 397 ms      QTC Interval 468 ms     Narrative:      HEART RATE=83  bpm  RR Rhttztii=328  ms  RI Hkgjxyna=944  ms  P Horizontal Axis=-49  deg  P Front Axis=19  deg  QRSD Fqyfcewk=543  ms  QT Vpwamjnj=425  ms  PUiN=568  ms  QRS Axis=-57  deg  T Wave Axis=39  deg  - ABNORMAL ECG -  Sinus rhythm  LAD, consider left anterior fascicular block  Consider anterior infarct  Date and Time of Study:2025-04-18 21:04:45    Telemetry Scan [[id number]] Resulted: 04/18/25     Updated: 04/19/25 0443    Telemetry Scan [[id number]] Resulted: 04/18/25     Updated: 04/19/25 0951          reviewed    Results for orders placed during the hospital encounter of 05/17/24    Vascular screening (bundle) CAR    Interpretation Summary    Mild stenosis of the bilateral internal carotid arteries otherwise normal screening vascular ultrasound study          Microbiology Results (last 10 days)       Procedure Component Value - Date/Time    Respiratory Panel PCR w/COVID-19(SARS-CoV-2) [facility] In-House, NP Swab in UTM/VTM, 2 HR TAT - Swab, Nasopharynx  [[id number]]  (Normal) Collected: 04/18/25 2215    Lab Status: Final result Specimen: Swab from Nasopharynx Updated: 04/18/25 2308     ADENOVIRUS, PCR Not Detected     Coronavirus 229E Not Detected     Coronavirus HKU1 Not Detected     Coronavirus NL63 Not Detected     Coronavirus OC43 Not Detected     COVID19 Not Detected     Human Metapneumovirus Not Detected     Human Rhinovirus/Enterovirus Not Detected     Influenza A PCR Not Detected     Influenza B PCR Not Detected     Parainfluenza Virus 1 Not Detected     Parainfluenza Virus 2 Not Detected     Parainfluenza Virus 3 Not Detected     Parainfluenza Virus 4 Not Detected     RSV, PCR Not Detected     Bordetella pertussis pcr Not Detected     Bordetella parapertussis PCR Not Detected     Chlamydophila pneumoniae PCR Not Detected     Mycoplasma pneumo by PCR Not Detected    Narrative:      In the setting of a positive respiratory panel with a viral infection PLUS a negative procalcitonin without other underlying concern for bacterial infection, consider observing off antibiotics or discontinuation of antibiotics and continue supportive care. If the respiratory panel is positive for atypical bacterial infection (Bordetella pertussis, Chlamydophila pneumoniae, or Mycoplasma pneumoniae), consider antibiotic de-escalation to target atypical bacterial infection.            Assessment & Plan     Chest pain     Chest pain  Lab Results   Component Value Date    TROPONINT 18 (H) 04/18/2025    TROPONINT 19 (H) 04/18/2025    TROPONINT <0.010 02/27/2022   - proBNP: 75.1  - TSH: 1.260  --UA showed trace leukocyte esterase but was otherwise unremarkable  - Respiratory panel negative  -Chest X-ray: No acute findings  -EKG: Sinus rhythm 83 with left anterior fascicular block and a QTc of 468 ms  -In the ED pt given morphine, Zofran  - Cardiology consulted  -Telemetry  -NPO    Hypertension  - Moderately controlled   BP Readings from Last 1 Encounters:   04/19/25 144/59   - Continue  amlodipine, lisinopril and hydrochlorothiazide with close monitoring of kidney function  - Monitor while admitted    Hypothyroidism  - Levothyroxine    Hyperlipidemia  - Statin    RA  - Plaquenil    I discussed the patients findings and my recommendations with patient and nursing staff.     Discharge Diagnosis:      Chest pain      Hospital Course  Patient is a 85 y.o. female presented with right-sided chest pain with an HPI noted above.  Serial troponins were assessed found to be 19, 18 with a proBNP of 75.1 and TSH of 1.260.  UA showed trace leukocyte esterase with close otherwise unremarkable and respiratory panel was found to be negative.  Chest x-ray showed no acute findings and EKG showed sinus rhythm at 83 without obvious acute changes.  Patient was continued on telemetry without other significant events reported.  In the ED patient was given morphine and Zofran with improvement in symptoms noted.  Cardiology was consulted in the ED who ordered D-dimer which found to be 1.05 with subsequent CT PE protocol showing no evidence of pulmonary embolism or acute cardiopulmonary process.  Patient given muscle relaxer as well as lidocaine for her pain and blood pressures have improved to 123/51, cardiologist did discuss possibly adding beta-blocker though will defer to outpatient providers and patient is instructed to continue to monitor and log blood pressures and heart rates to discuss with PCP at next visit.she will be sent home with a short course of tizanidine and is instructed to use over-the-counter lidocaine patches as well as Tylenol as needed at this time patient is felt to be in good condition for discharge with close follow-up with her PCP and cardiology on an outpatient basis.  Her full testing/results and plan were discussed with patient along with concerning/alarm symptoms for which to call 911/return to the ED.  All questions were answered and she verbalizes her understanding and agreement.    Past  Medical History:     Past Medical History:   Diagnosis Date    Coronary artery disease     Hyperlipidemia     Hypertension     Hypothyroidism        Past Surgical History:     Past Surgical History:   Procedure Laterality Date    JOINT REPLACEMENT      REPLACEMENT TOTAL KNEE         Social History:   Social History     Socioeconomic History    Marital status:    Tobacco Use    Smoking status: Never     Passive exposure: Never    Smokeless tobacco: Never   Vaping Use    Vaping status: Never Used   Substance and Sexual Activity    Alcohol use: No    Drug use: No    Sexual activity: Defer       Procedures Performed         Consults:   Consults       Date and Time Order Name Status Description    4/19/2025 12:52 AM Inpatient Cardiology Consult              Condition on Discharge:     Stable    Discharge Disposition      Discharge Medications     Discharge Medications        ASK your doctor about these medications        Instructions Start Date   acetaminophen 325 MG tablet  Commonly known as: TYLENOL   325 mg, Every 6 Hours PRN      amLODIPine 5 MG tablet  Commonly known as: NORVASC   5 mg, Oral, Daily      aspirin 325 MG EC tablet   325 mg, Oral, Every 6 Hours PRN      calcium carbonate-vitamin d 600-400 MG-UNIT per tablet   600 mg, 2 Times Daily      fluticasone 50 MCG/ACT nasal spray  Commonly known as: FLONASE   2 sprays, Nasal, Daily      hydroxychloroquine 200 MG tablet  Commonly known as: PLAQUENIL   TAKE 1 TABLET BY MOUTH TWICE DAILY FOR RHEUMATOID ARTHRITIS      levothyroxine 50 MCG tablet  Commonly known as: SYNTHROID, LEVOTHROID   Take 1 tablet by mouth once daily      lisinopril-hydrochlorothiazide 20-25 MG per tablet  Commonly known as: PRINZIDE,ZESTORETIC   1 tablet, Oral, Daily      multivitamin capsule   1 capsule, Daily      potassium chloride 10 MEQ CR tablet   Take one tablet on Tuesday and saturday      rosuvastatin 10 MG tablet  Commonly known as: CRESTOR   TAKE 1 TABLET BY MOUTH ONCE DAILY  AT BEDTIME      Vitamin C 500 MG capsule   500 mg, Daily               Discharge Diet:     Activity at Discharge:     Follow-up Appointments  Future Appointments   Date Time Provider Department Center   9/22/2025  1:00 PM Sylvia Almeida APRN [facility]  [facility]         Test Results Pending at Discharge  Pending Results       Procedure [Order ID] Specimen - Date/Time    D-dimer, Quantitative [[id number]]     Specimen: Blood              Risk for Readmission (LACE) Score: 7 (4/19/2025  6:00 AM)      Greater than 30 minutes spent in discharge activities for this patient    Signature:Electronically signed by Alan Becerra PA-C, 04/19/25, 4:47 PM EDT.

## 2025-04-19 NOTE — CONSULTS
[facility] [place] [facility]   CONSULT NOTE    Referring Provider: Tayo Mi PA-C   Reason for Consultation: Chest pain     Patient Care Team:  Sylvia Almeida APRN as PCP - General (Nurse Practitioner)  Sylvia Almeida APRN as PCP - Family Medicine  Virgilio uRiz MD as Consulting Physician (Cardiology)  Alberto Qiu MD as Consulting Physician (Gastroenterology)    Chief complaint- right sided chest pain         History of present illness:  Dannielle Wang is a 85 y.o. female with past medical history of non obstructive CAD, hypertension, dyslipidemia, hypothyroidism presented to the ED with right sided chest pain that occurs with cough, deep breath and something with swallowing.  Patient denies any fever chills or lower extremity edema.  She reports cough every morning when trying to clear sputum from throat.  On examination she is tender to palpitate on right and left chest.  She reports that she travelled to her brothers  a few hours away for a few days and has not been doing anything to be so sore.     Labs in the ED showed HS troponin of 19--18, pro bnp negative at 75 potassium 3.4 bun 24 creatinine 1.27 TSH normal CBC unremarkable Respiratory viral panel negative   EKG normal sinus rhythm LAFB     CXR negative     Review of Systems   Constitutional: Positive for malaise/fatigue. Negative for fever.   Cardiovascular:  Positive for chest pain (right sided). Negative for dyspnea on exertion, leg swelling, palpitations and syncope.   Respiratory:  Positive for cough and sputum production (morning phlegm).    All other systems reviewed and are negative.      History  Past Medical History:   Diagnosis Date    Coronary artery disease     Hyperlipidemia     Hypertension     Hypothyroidism        Past Surgical History:   Procedure Laterality Date    JOINT REPLACEMENT      REPLACEMENT TOTAL KNEE         Family History   Problem Relation Age of Onset    Hypertension  Mother     Stroke Mother     Heart disease Father     No Known Problems Sister     No Known Problems Brother     No Known Problems Maternal Aunt     No Known Problems Maternal Uncle     No Known Problems Paternal Aunt     No Known Problems Paternal Uncle     No Known Problems Maternal Grandmother     No Known Problems Maternal Grandfather     No Known Problems Paternal Grandmother     No Known Problems Paternal Grandfather     No Known Problems Other     Anemia Neg Hx     Arrhythmia Neg Hx     Asthma Neg Hx     Clotting disorder Neg Hx     Fainting Neg Hx     Heart attack Neg Hx     Heart failure Neg Hx     Hyperlipidemia Neg Hx        Social History     Tobacco Use    Smoking status: Never     Passive exposure: Never    Smokeless tobacco: Never   Vaping Use    Vaping status: Never Used   Substance Use Topics    Alcohol use: No    Drug use: No        Medications Prior to Admission   Medication Sig Dispense Refill Last Dose/Taking    acetaminophen (TYLENOL) 325 MG tablet Take 1 tablet by mouth Every 6 (Six) Hours As Needed.   Past Month    amLODIPine (NORVASC) 5 MG tablet Take 1 tablet by mouth Daily. 90 tablet 3 4/18/2025 Morning    Ascorbic Acid (VITAMIN C) 500 MG capsule Take 500 mg by mouth Daily.   4/18/2025 Morning    aspirin 325 MG EC tablet Take 1 tablet by mouth Every 6 (Six) Hours As Needed for Mild Pain. 30 tablet 2 4/18/2025 Morning    calcium carbonate-vitamin d 600-400 MG-UNIT per tablet Take 600 mg by mouth 2 (Two) Times a Day.   4/18/2025 Evening    hydroxychloroquine (PLAQUENIL) 200 MG tablet TAKE 1 TABLET BY MOUTH TWICE DAILY FOR RHEUMATOID ARTHRITIS 180 tablet 0 4/18/2025 Morning    levothyroxine (SYNTHROID, LEVOTHROID) 50 MCG tablet Take 1 tablet by mouth once daily 90 tablet 0 4/18/2025 Morning    lisinopril-hydrochlorothiazide (PRINZIDE,ZESTORETIC) 20-25 MG per tablet Take 1 tablet by mouth Daily. 90 tablet 3 4/18/2025 Morning    Multiple Vitamin (MULTIVITAMIN) capsule Take 1 capsule by mouth  "Daily.   4/18/2025 Morning    rosuvastatin (CRESTOR) 10 MG tablet TAKE 1 TABLET BY MOUTH ONCE DAILY AT BEDTIME 90 tablet 0 4/18/2025 Evening    fluticasone (FLONASE) 50 MCG/ACT nasal spray 2 sprays into the nostril(s) as directed by provider Daily. (Patient taking differently: Administer 2 sprays into the nostril(s) as directed by provider As Needed.) 9.9 mL 0     potassium chloride 10 MEQ CR tablet Take one tablet on Tuesday and saturday 30 tablet 01          Atorvastatin calcium and Cephalexin    Scheduled Meds:amLODIPine, 5 mg, Oral, Daily  lisinopril, 20 mg, Oral, Q24H   And  hydroCHLOROthiazide, 25 mg, Oral, Q24H  hydroxychloroquine, 200 mg, Oral, Q12H  levothyroxine, 50 mcg, Oral, Q AM  potassium chloride ER, 40 mEq, Oral, Q4H  rosuvastatin, 10 mg, Oral, Nightly  sodium chloride, 10 mL, Intravenous, Q12H      Continuous Infusions:   PRN Meds:.  acetaminophen **OR** acetaminophen **OR** acetaminophen    aspirin    senna-docusate sodium **AND** polyethylene glycol **AND** bisacodyl **AND** bisacodyl    Calcium Replacement - Follow Nurse / BPA Driven Protocol    HYDROcodone-acetaminophen    HYDROmorphone **AND** naloxone    Magnesium Standard Dose Replacement - Follow Nurse / BPA Driven Protocol    nitroglycerin    Phosphorus Replacement - Follow Nurse / BPA Driven Protocol    Potassium Replacement - Follow Nurse / BPA Driven Protocol    sodium chloride    sodium chloride    sodium chloride        VITAL SIGNS  Vitals:    04/19/25 0017 04/19/25 0032 04/19/25 0347 04/19/25 0723   BP: 119/64 137/59 128/52 144/59   BP Location:   Right arm Left arm   Patient Position:   Lying Lying   Pulse: 67 72 65 71   Resp:   17 18   Temp:   98.2 °F (36.8 °C) 97.7 °F (36.5 °C)   TempSrc:   Oral Oral   SpO2: 93% 93% 93% 94%   Weight:   92.4 kg (203 lb 11.3 oz)    Height:   154.9 cm (60.98\")        Flowsheet Rows      Flowsheet Row First Filed Value   Admission Height 154.9 cm (61\") Documented at 04/18/2025 2057   Admission Weight " 92.4 kg (203 lb 11.3 oz) Documented at 04/19/2025 0347             TELEMETRY:  sinus rhythm     Physical Exam:  Vitals reviewed.   Constitutional:       Appearance: Normal appearance. Well-groomed and not in distress. Obese and frail.   Neck:      Vascular: No JVR. JVD normal.   Pulmonary:      Effort: Pulmonary effort is normal.      Breath sounds: Normal breath sounds. No wheezing. No rhonchi. No rales.   Chest:      Chest wall: Not tender to palpatation.   Cardiovascular:      Tender to touch  Normal rate. Regular rhythm. Normal S1. Normal S2.       Murmurs: There is no murmur.      No gallop.  No click. No rub.   Pulses:     Intact distal pulses.   Edema:     Peripheral edema absent.   Abdominal:      General: Bowel sounds are normal.      Palpations: Abdomen is soft.      Tenderness: There is no abdominal tenderness.   Musculoskeletal: Normal range of motion.         General: No tenderness. Skin:     General: Skin is warm and dry.   Neurological:      General: No focal deficit present.      Mental Status: Alert and oriented to person, place and time.   Psychiatric:         Behavior: Behavior is cooperative.                  LAB RESULTS (LAST 7 DAYS)    CBC  Results from last 7 days   Lab Units 04/19/25  0431 04/18/25  2215   WBC 10*3/mm3 6.31 6.33   RBC 10*6/mm3 3.87 4.31   HEMOGLOBIN g/dL 11.7* 12.9   HEMATOCRIT % 35.6 39.3   MCV fL 92.0 91.2   PLATELETS 10*3/mm3 182 195       BMP  Results from last 7 days   Lab Units 04/19/25  0845 04/18/25  2215   SODIUM mmol/L 142 137   POTASSIUM mmol/L 3.6 3.4*   CHLORIDE mmol/L 105 101   CO2 mmol/L 27.0 24.8   BUN mg/dL 18 24*   CREATININE mg/dL 1.07* 1.27*   GLUCOSE mg/dL 85 102*   MAGNESIUM mg/dL  --  2.2       CMP   Results from last 7 days   Lab Units 04/19/25  0845 04/18/25  2215   SODIUM mmol/L 142 137   POTASSIUM mmol/L 3.6 3.4*   CHLORIDE mmol/L 105 101   CO2 mmol/L 27.0 24.8   BUN mg/dL 18 24*   CREATININE mg/dL 1.07* 1.27*   GLUCOSE mg/dL 85 102*   ALBUMIN g/dL  3.9 4.1   BILIRUBIN mg/dL 0.4 0.3   ALK PHOS U/L 49 56   AST (SGOT) U/L 24 27   ALT (SGPT) U/L 16 17         BNP        TROPONIN  Results from last 7 days   Lab Units 04/18/25  2347   HSTROP T ng/L 18*       CoAg        Creatinine Clearance  Estimated Creatinine Clearance: 39.1 mL/min (A) (by C-G formula based on SCr of 1.07 mg/dL (H)).    ABG        Radiology  XR Chest 2 View  Result Date: 4/18/2025  Impression: No acute cardiopulmonary findings. Electronically Signed: Petr Song MD  4/18/2025 10:15 PM EDT  Workstation ID: [id number]          EKG      I personally viewed and interpreted the patient's EKG/Telemetry data:    ECHOCARDIOGRAM:        STRESS MYOVIEW:    CARDIAC CATHETERIZATION:  cardiac catheterization from 2012 which shows about 40% stenosis in the mid LAD. Her LAD is actually very small caliber and does not reach the apex. She has a dual LAD system with a very large diagonal which further bifurcates and supplies the majority of the anterior wall. Her RCA has luminal irregularities       OTHER:         Assessment & Plan     Chest pain, atypical right side   Non obstructive CAD   Hypertension   Dyslipidemia   Hypokalemia   Hypothyroidism   Mild renal insufficiency   Obesity BMI 38     PLAN:     Patient presented with Right sided chest pain, worse with deep breath and cough (sometimes with swallowing)  Previous history of non obstructive CAD   On examination patient is tender to palpate   Gives history of recent travel   Will check D-Dimer if elevated will check CT PE protocol     Continue blood pressure control with amlodipine, lisinopril/hctz   Add aspirin 81mg   Continue statin therapy   Consider adding low dose beta blocker         I discussed the patient's findings and my recommendations with patient and ROHAN Daly  04/19/25  12:04 EDT  Electronically signed by ROHAN Robbins, 04/19/25, 12:26 PM EDT.

## 2025-04-19 NOTE — ED PROVIDER NOTES
Subjective     Chief Complaint   Patient presents with    Chest Pain     History of Present Illness  Chief complaint: Chest pain    Context: Patient is an 85-year-old  female with a history of coronary artery disease, hypertension, hyperlipidemia, and hypothyroidism who presents to the emergency room with complaints of chest pain.  Patient states that chest pain began this morning upon waking and is located in the right side of her chest, radiating up toward the neck but not reaching the neck.  States that she has felt weak and fatigued all day today.  Patient also complains of additional chest pain with sneezing and coughing.  Patient did have an episode of subjective numbness to the left side of the face 3 weeks prior which lasted approximately 1 day; this has fully resolved at this time.  Patient denies any other complaints not addressed above; no headache, no abdominal pain no nausea, vomiting, diarrhea.    PCP: Sylvia Almeida APRN    LNMP: Hysterectomy        Review of Systems   Cardiovascular:  Positive for chest pain.       Past Medical History:   Diagnosis Date    Coronary artery disease     Hyperlipidemia     Hypertension     Hypothyroidism        Allergies   Allergen Reactions    Atorvastatin Calcium Myalgia    Cephalexin Dizziness       Past Surgical History:   Procedure Laterality Date    JOINT REPLACEMENT      REPLACEMENT TOTAL KNEE         Family History   Problem Relation Age of Onset    Hypertension Mother     Stroke Mother     Heart disease Father     No Known Problems Sister     No Known Problems Brother     No Known Problems Maternal Aunt     No Known Problems Maternal Uncle     No Known Problems Paternal Aunt     No Known Problems Paternal Uncle     No Known Problems Maternal Grandmother     No Known Problems Maternal Grandfather     No Known Problems Paternal Grandmother     No Known Problems Paternal Grandfather     No Known Problems Other     Anemia Neg Hx     Arrhythmia Neg Hx      Asthma Neg Hx     Clotting disorder Neg Hx     Fainting Neg Hx     Heart attack Neg Hx     Heart failure Neg Hx     Hyperlipidemia Neg Hx        Social History     Socioeconomic History    Marital status:    Tobacco Use    Smoking status: Never     Passive exposure: Never    Smokeless tobacco: Never   Vaping Use    Vaping status: Never Used   Substance and Sexual Activity    Alcohol use: No    Drug use: No    Sexual activity: Defer           Objective   Physical Exam  Vitals and nursing note reviewed.   Constitutional:       General: She is not in acute distress.     Appearance: She is obese. She is not ill-appearing or toxic-appearing.   HENT:      Head: Normocephalic and atraumatic.   Eyes:      Extraocular Movements: Extraocular movements intact.      Pupils: Pupils are equal, round, and reactive to light.   Neck:      Vascular: No JVD.      Trachea: No tracheal deviation.   Cardiovascular:      Rate and Rhythm: Normal rate and regular rhythm.      Heart sounds: Normal heart sounds.   Pulmonary:      Effort: Pulmonary effort is normal. No tachypnea or respiratory distress.   Chest:      Chest wall: No mass or tenderness.   Abdominal:      General: Bowel sounds are normal.      Palpations: Abdomen is soft.      Tenderness: There is no abdominal tenderness.   Musculoskeletal:      Cervical back: Normal range of motion and neck supple.      Right lower leg: No edema.      Left lower leg: No edema.   Skin:     General: Skin is warm and dry.      Capillary Refill: Capillary refill takes less than 2 seconds.      Coloration: Skin is not cyanotic or pale.   Neurological:      General: No focal deficit present.      Mental Status: She is alert and oriented to person, place, and time.   Psychiatric:         Mood and Affect: Mood normal.         Behavior: Behavior normal.         Procedures           ED Course  ED Course as of 04/18/25 2241   Fri Apr 18, 2025 2237 CXR results reviewed [RS]      ED Course User  "Index  [RS] Tayo Mi, MALCOM      /47   Pulse 75   Temp 98.4 °F (36.9 °C) (Oral)   Resp 19   Ht 154.9 cm (61\")   LMP  (LMP Unknown)   SpO2 95%   BMI 38.02 kg/m²   Labs Reviewed   COMPREHENSIVE METABOLIC PANEL - Abnormal; Notable for the following components:       Result Value    Glucose 102 (*)     BUN 24 (*)     Creatinine 1.27 (*)     Potassium 3.4 (*)     eGFR 41.3 (*)     All other components within normal limits    Narrative:     GFR Categories in Chronic Kidney Disease (CKD)      GFR Category          GFR (mL/min/1.73)    Interpretation  G1                     90 or greater         Normal or high (1)  G2                      60-89                Mild decrease (1)  G3a                   45-59                Mild to moderate decrease  G3b                   30-44                Moderate to severe decrease  G4                    15-29                Severe decrease  G5                    14 or less           Kidney failure          (1)In the absence of evidence of kidney disease, neither GFR category G1 or G2 fulfill the criteria for CKD.    eGFR calculation 2021 CKD-EPI creatinine equation, which does not include race as a factor   TROPONIN - Abnormal; Notable for the following components:    HS Troponin T 19 (*)     All other components within normal limits    Narrative:     High Sensitive Troponin T Reference Range:  <14.0 ng/L- Negative Female for AMI  <22.0 ng/L- Negative Male for AMI  >=14 - Abnormal Female indicating possible myocardial injury.  >=22 - Abnormal Male indicating possible myocardial injury.   Clinicians would have to utilize clinical acumen, EKG, Troponin, and serial changes to determine if it is an Acute Myocardial Infarction or myocardial injury due to an underlying chronic condition.        BNP (IN-HOUSE) - Normal    Narrative:     This assay is used as an aid in the diagnosis of individuals suspected of having heart failure. It can be used as an aid in the diagnosis of " acute decompensated heart failure (ADHF) in patients presenting with signs and symptoms of ADHF to the emergency department (ED). In addition, NT-proBNP of <300 pg/mL indicates ADHF is not likely.    Age Range Result Interpretation  NT-proBNP Concentration (pg/mL:      <50             Positive            >450                   Gray                 300-450                    Negative             <300    50-75           Positive            >900                  Gray                300-900                  Negative            <300      >75             Positive            >1800                  Gray                300-1800                  Negative            <300   TSH RFX ON ABNORMAL TO FREE T4 - Normal   MAGNESIUM - Normal   CBC WITH AUTO DIFFERENTIAL - Normal   RESPIRATORY PANEL PCR W/ COVID-19 (SARS-COV-2), NP SWAB IN UTM/VTP, 2 HR TAT   URINALYSIS W/ CULTURE IF INDICATED   HIGH SENSITIVITIY TROPONIN T 1HR   CBC AND DIFFERENTIAL    Narrative:     The following orders were created for panel order CBC & Differential.  Procedure                               Abnormality         Status                     ---------                               -----------         ------                     CBC Auto Differential[[id number]]        Normal              Final result                 Please view results for these tests on the individual orders.   EXTRA TUBES    Narrative:     The following orders were created for panel order Extra Tubes.  Procedure                               Abnormality         Status                     ---------                               -----------         ------                     Gold Top - SST[[id number]]                                   Final result               Light Blue Top[[id number]]                                   Final result                 Please view results for these tests on the individual orders.   GOLD TOP - SST   LIGHT BLUE TOP     Medications   sodium chloride 0.9 % flush 10  "mL (has no administration in time range)   morphine injection 2 mg (2 mg Intravenous Given 4/18/25 2251)   ondansetron (ZOFRAN) injection 4 mg (4 mg Intravenous Given 4/18/25 2251)     XR Chest 2 View  Result Date: 4/18/2025  Impression: No acute cardiopulmonary findings. Electronically Signed: Petr Song MD  4/18/2025 10:15 PM EDT  Workstation ID: [id number]                                                     Medical Decision Making  /59   Pulse 72   Temp 98.4 °F (36.9 °C) (Oral)   Resp 19   Ht 154.9 cm (61\")   LMP  (LMP Unknown)   SpO2 93%   BMI 38.02 kg/m²      Chart review:      Comorbidities:  has a past medical history of Coronary artery disease, Hyperlipidemia, Hypertension, and Hypothyroidism.  Differentials:   not all inclusive of differentials considered  Discussion with provider:  Radiology interpretation:  X-rays reviewed by me and interpreted by radiologist,   XR Chest 2 View  Result Date: 4/18/2025  Impression: No acute cardiopulmonary findings. Electronically Signed: Petr Song MD  4/18/2025 10:15 PM EDT  Workstation ID: [id number]    Lab interpretation:  Labs viewed by me significant for,    EKG reviewed by ED attending and myself as sinus rhythm with LAD; rate 83, , QTc 468.  EKG compared to previous from 2/27/2022.  EKG at that time was sinus bradycardia with LAFB and a probable lateral infarct in the past; rate 58, , QTc 444.  Appropriate PPE worn during exam.    i discussed findings with patient who voices understanding of discharge instructions, signs and symptoms requiring return to ED; discharged improved and in stable condition with follow up for re-evaluation.  This document is intended for medical expert use only. Reading of this document by patients and/or patient's family without participating medical staff guidance may result in misinterpretation and unintended morbidity.  Any interpretation of such data is the responsibility of the patient and/or family member " responsible for the patient in concert with their primary or specialist providers, not to be left for sources of online searches such as [employer], [employer] or similar queries. Relying on these approaches to knowledge may result in misinterpretation, misguided goals of care and even death should patients or family members try recommendations outside of the realm of professional medical care in a supervised inpatient environment.         Problems Addressed:  Chest pain, unspecified type: complicated acute illness or injury  Subacute cough: complicated acute illness or injury  Weakness: complicated acute illness or injury    Amount and/or Complexity of Data Reviewed  Labs: ordered.  Radiology: ordered.    Risk  OTC drugs.  Prescription drug management.  Decision regarding hospitalization.        Final diagnoses:   None       ED Disposition  ED Disposition       None            No follow-up provider specified.       Medication List      No changes were made to your prescriptions during this visit.          "40 mEq (40 mEq Oral Given 4/19/25 1457)   iopamidol (ISOVUE-370) 76 % injection 100 mL (100 mL Intravenous Given 4/19/25 1250)     No radiology results for the last day                                                     Medical Decision Making  /59   Pulse 72   Temp 98.4 °F (36.9 °C) (Oral)   Resp 19   Ht 154.9 cm (61\")   LMP  (LMP Unknown)   SpO2 93%   BMI 38.02 kg/m²      Chart review: Patient has no recent ED/UC visits to review.    Patient is 85-year-old female who presents emergency department complaining of chest pain.  See full HPI above.    Primary assessment and initial physical exam noted above.    Patient placed in to ED bed and gown for assessment.  IV access established for labs and medication administration if indicated.  Primary differentials for patient include ACS, electrolyte dysfunction, viral illness.    Comorbidities:  has a past medical history of Coronary artery disease, Hyperlipidemia, Hypertension, and Hypothyroidism.    Discussion with provider: Dr Tomer Waters    Radiology interpretation: Imaging reviewed by radiologist, Dr. Waters, and myself  XR Chest 2 View  Result Date: 4/18/2025  Impression: No acute cardiopulmonary findings. Electronically Signed: Petr Song MD  4/18/2025 10:15 PM EDT  Workstation ID: [id number]    Lab interpretation:  Labs viewed by me significant for troponin of 19 and 18; troponin delta 1 hour -1.  Other labs unremarkable with no leukocytosis or cytopenia on CBC, no electrolyte derangement or kidney injury on CMP, respiratory panel all negative.    EKG reviewed by ED attending and myself as sinus rhythm with LAD; rate 83, , QTc 468.  EKG compared to previous from 2/27/2022.  EKG at that time was sinus bradycardia with LAFB and a probable lateral infarct in the past; rate 58, , QTc 444.    Patient is comfortable results and repeat physical exam performed.  Exam is unchanged from previous; patient remains alert and oriented, nontoxic in " appearance GCS 15.  Patient is informed of plan to place her in observation status with a cardiology consult following morning.  Patient verbalized understanding of and is supportive of this plan of care.    Appropriate PPE worn during exam.    i discussed findings with patient who voices understanding of discharge instructions, signs and symptoms requiring return to ED; discharged improved and in stable condition with follow up for re-evaluation.  This document is intended for medical expert use only. Reading of this document by patients and/or patient's family without participating medical staff guidance may result in misinterpretation and unintended morbidity.  Any interpretation of such data is the responsibility of the patient and/or family member responsible for the patient in concert with their primary or specialist providers, not to be left for sources of online searches such as [employer], [employer] or similar queries. Relying on these approaches to knowledge may result in misinterpretation, misguided goals of care and even death should patients or family members try recommendations outside of the realm of professional medical care in a supervised inpatient environment.       Problems Addressed:  Chest pain, unspecified type: complicated acute illness or injury  Subacute cough: complicated acute illness or injury  Weakness: complicated acute illness or injury    Amount and/or Complexity of Data Reviewed  Labs: ordered.  Radiology: ordered.    Risk  OTC drugs.  Prescription drug management.  Decision regarding hospitalization.        Final diagnoses:   Chest pain, unspecified type   Subacute cough   Weakness       ED Disposition  ED Disposition       ED Disposition   Decision to Admit    Condition   --    Comment   --               Sylvia Almeida, APRN  [address]  [address] [place] [id number] [phone]      5 to 7 days         Medication List        New Prescriptions      tiZANidine 4 MG tablet  Commonly  known as: ZANAFLEX  Take 1 tablet by mouth Every 8 (Eight) Hours As Needed for Muscle Spasms.               Where to Get Your Medications        These medications were sent to [facility] [address] [place] [address] [place] - [address] [facility] - [phone]  - [phone]   [address] [facility] [place] [address]      Phone: [phone]   tiZANidine 4 MG tablet            Tayo Mi, PACadenC  04/23/25 0016

## 2025-04-20 LAB
QT INTERVAL: 397 MS
QTC INTERVAL: 468 MS

## 2025-04-21 ENCOUNTER — TRANSITIONAL CARE MANAGEMENT TELEPHONE ENCOUNTER (OUTPATIENT)
Dept: CALL CENTER | Facility: HOSPITAL | Age: 85
End: 2025-04-21
Payer: MEDICARE

## 2025-04-21 NOTE — OUTREACH NOTE
Call Center TCM Note      Flowsheet Row Responses   [facility] patient discharged from? [facility]   Does the patient have one of the following disease processes/diagnoses(primary or secondary)? Other   TCM attempt successful? Yes   Call start time 0849   Call end time 0853   Discharge diagnosis Chest pain   Meds reviewed with patient/caregiver? Yes   Is the patient having any side effects they believe may be caused by any medication additions or changes? No   Does the patient have all medications ordered at discharge? Yes   Is the patient taking all medications as directed (includes completed medication regime)? Yes   Comments Appt made for 4/22 @ 1:00 with ROHAN Hernadez   Does the patient have an appointment with their PCP within 7-14 days of discharge? No   Nursing Interventions Assisted patient with making appointment per protocol   Psychosocial issues? No   Did the patient receive a copy of their discharge instructions? Yes   Nursing interventions Reviewed instructions with patient   What is the patient's perception of their health status since discharge? Improving   Is the patient/caregiver able to teach back signs and symptoms related to disease process for when to call PCP? Yes   Is the patient/caregiver able to teach back signs and symptoms related to disease process for when to call 911? Yes   Is the patient/caregiver able to teach back the hierarchy of who to call/visit for symptoms/problems? PCP, Specialist, Home health nurse, Urgent Care, ED, 911 Yes   If the patient is a current smoker, are they able to teach back resources for cessation? Not a smoker   Additional teach back comments Pain has improved but still has some slight pain when coughing. She does feel Zanaflex has helped   TCM call completed? Yes   Wrap up additional comments Appt made with PCP with no other needs or questions at this time.   Call end time 0853            Duyen BAKER - Licensed Nurse    4/21/2025, 08:54 EDT

## 2025-04-22 ENCOUNTER — OFFICE VISIT (OUTPATIENT)
Dept: FAMILY MEDICINE CLINIC | Facility: CLINIC | Age: 85
End: 2025-04-22
Payer: MEDICARE

## 2025-04-22 VITALS
TEMPERATURE: 98 F | DIASTOLIC BLOOD PRESSURE: 75 MMHG | OXYGEN SATURATION: 95 % | WEIGHT: 201 LBS | HEIGHT: 61 IN | BODY MASS INDEX: 37.95 KG/M2 | HEART RATE: 70 BPM | SYSTOLIC BLOOD PRESSURE: 134 MMHG

## 2025-04-22 DIAGNOSIS — S39.011A STRAIN OF ABDOMINAL MUSCLE, INITIAL ENCOUNTER: Primary | ICD-10-CM

## 2025-04-22 PROCEDURE — 99213 OFFICE O/P EST LOW 20 MIN: CPT | Performed by: NURSE PRACTITIONER

## 2025-04-22 PROCEDURE — 1126F AMNT PAIN NOTED NONE PRSNT: CPT | Performed by: NURSE PRACTITIONER

## 2025-04-22 RX ORDER — PREDNISONE 10 MG/1
TABLET ORAL
Qty: 19 TABLET | Refills: 0 | Status: SHIPPED | OUTPATIENT
Start: 2025-04-22 | End: 2025-05-04

## 2025-04-22 RX ORDER — MELOXICAM 7.5 MG/1
7.5 TABLET ORAL 2 TIMES DAILY
Qty: 14 TABLET | Refills: 0 | Status: SHIPPED | OUTPATIENT
Start: 2025-04-22

## 2025-04-22 NOTE — PROGRESS NOTES
Dannielle Wang is a 85 y.o. female.     History of Present Illness  85-year-old white female with history of CAD, hypertension, hyperlipidemia, hypothyroidism, TIA, rheumatoid arthritis and hysterectomy who comes in today for hospital follow-up visit    Blood pressure 134/74 heart rate 70 she denies any chest pain, dyspnea, tachycardia or dizziness    On April 18 patient started having pain upper part of the right breast that was a pretty sharp pain.  She went to the emergency room was admitted for workup.  Cardiac workup was negative she was negative for PE.  They ended up giving her muscle relaxer.  Patient states she had been out of town visiting someone who was sick and I think with doing a lot more walking than normal.  She walks with a 4 pronged cane kind of bent over I think she probably pulled a muscle using that right arm so much more than normal.   I am going to place her on steroids meloxicam for 1 week along with for muscle relaxer and moist heat.  I told patient if the pain is not improved a great deal or gone by next week to let me know    Weight is 212 BMI of 38.  She skipped 2 COVID vaccines up-to-date on eye exam.  Next DEXA scan is due in May 2026.  She no longer does mammograms or colon cancer due to her age            Meloxicam 7.5 mg twice daily with food x 1 week  Prednisone 10 mg taper dose daily as directed  Continue with muscle relaxer at night  Heating pad at night after taking off lidocaine patch  If no improvement by next week call office       The following portions of the patient's history were reviewed and updated as appropriate: allergies, current medications, past family history, past medical history, past social history, past surgical history, and problem list.    Vitals:    04/22/25 1251   BP: 134/75   BP Location: Right arm   Patient Position: Sitting   Cuff Size: Adult   Pulse: 70   Temp: 98 °F (36.7 °C)   TempSrc: Infrared   SpO2: 95%   Weight: 91.2 kg (201 lb)   Height:  "154.9 cm (60.98\")       Past Medical History:   Diagnosis Date    Coronary artery disease     Hyperlipidemia     Hypertension     Hypothyroidism      Past Surgical History:   Procedure Laterality Date    JOINT REPLACEMENT      REPLACEMENT TOTAL KNEE       Family History   Problem Relation Age of Onset    Hypertension Mother     Stroke Mother     Heart disease Father     No Known Problems Sister     No Known Problems Brother     No Known Problems Maternal Aunt     No Known Problems Maternal Uncle     No Known Problems Paternal Aunt     No Known Problems Paternal Uncle     No Known Problems Maternal Grandmother     No Known Problems Maternal Grandfather     No Known Problems Paternal Grandmother     No Known Problems Paternal Grandfather     No Known Problems Other     Anemia Neg Hx     Arrhythmia Neg Hx     Asthma Neg Hx     Clotting disorder Neg Hx     Fainting Neg Hx     Heart attack Neg Hx     Heart failure Neg Hx     Hyperlipidemia Neg Hx      Immunization History   Administered Date(s) Administered    COVID-19 (MODERNA) 1st,2nd,3rd Dose Monovalent 01/15/2021, 02/12/2021    Fluad Quad 65+ 12/19/2019, 12/17/2020    Fluzone High-Dose 65+YRS 09/26/2016, 10/16/2018    Fluzone High-Dose 65+yrs 09/13/2022    PPD Test 06/02/2004    Pneumococcal Conjugate 20-Valent (PCV20) 09/13/2022    Pneumococcal Polysaccharide (PPSV23) 12/17/2020       Admission on 04/18/2025, Discharged on 04/19/2025   Component Date Value Ref Range Status    QT Interval 04/18/2025 397  ms Final    QTC Interval 04/18/2025 468  ms Final    Glucose 04/18/2025 102 (H)  65 - 99 mg/dL Final    BUN 04/18/2025 24 (H)  8 - 23 mg/dL Final    Creatinine 04/18/2025 1.27 (H)  0.57 - 1.00 mg/dL Final    Sodium 04/18/2025 137  136 - 145 mmol/L Final    Potassium 04/18/2025 3.4 (L)  3.5 - 5.2 mmol/L Final    Chloride 04/18/2025 101  98 - 107 mmol/L Final    CO2 04/18/2025 24.8  22.0 - 29.0 mmol/L Final    Calcium 04/18/2025 9.1  8.6 - 10.5 mg/dL Final    Total " Protein 04/18/2025 6.5  6.0 - 8.5 g/dL Final    Albumin 04/18/2025 4.1  3.5 - 5.2 g/dL Final    ALT (SGPT) 04/18/2025 17  1 - 33 U/L Final    AST (SGOT) 04/18/2025 27  1 - 32 U/L Final    Alkaline Phosphatase 04/18/2025 56  39 - 117 U/L Final    Total Bilirubin 04/18/2025 0.3  0.0 - 1.2 mg/dL Final    Globulin 04/18/2025 2.4  gm/dL Final    A/G Ratio 04/18/2025 1.7  g/dL Final    BUN/Creatinine Ratio 04/18/2025 18.9  7.0 - 25.0 Final    Anion Gap 04/18/2025 11.2  5.0 - 15.0 mmol/L Final    eGFR 04/18/2025 41.3 (L)  >60.0 mL/min/1.73 Final    proBNP 04/18/2025 75.1  0.0 - 1,800.0 pg/mL Final    HS Troponin T 04/18/2025 19 (H)  <14 ng/L Final    TSH 04/18/2025 1.260  0.270 - 4.200 uIU/mL Final    Magnesium 04/18/2025 2.2  1.6 - 2.4 mg/dL Final    ADENOVIRUS, PCR 04/18/2025 Not Detected  Not Detected Final    Coronavirus 229E 04/18/2025 Not Detected  Not Detected Final    Coronavirus HKU1 04/18/2025 Not Detected  Not Detected Final    Coronavirus NL63 04/18/2025 Not Detected  Not Detected Final    Coronavirus OC43 04/18/2025 Not Detected  Not Detected Final    COVID19 04/18/2025 Not Detected  Not Detected - Ref. Range Final    Human Metapneumovirus 04/18/2025 Not Detected  Not Detected Final    Human Rhinovirus/Enterovirus 04/18/2025 Not Detected  Not Detected Final    Influenza A PCR 04/18/2025 Not Detected  Not Detected Final    Influenza B PCR 04/18/2025 Not Detected  Not Detected Final    Parainfluenza Virus 1 04/18/2025 Not Detected  Not Detected Final    Parainfluenza Virus 2 04/18/2025 Not Detected  Not Detected Final    Parainfluenza Virus 3 04/18/2025 Not Detected  Not Detected Final    Parainfluenza Virus 4 04/18/2025 Not Detected  Not Detected Final    RSV, PCR 04/18/2025 Not Detected  Not Detected Final    Bordetella pertussis pcr 04/18/2025 Not Detected  Not Detected Final    Bordetella parapertussis PCR 04/18/2025 Not Detected  Not Detected Final    Chlamydophila pneumoniae PCR 04/18/2025 Not Detected   Not Detected Final    Mycoplasma pneumo by PCR 04/18/2025 Not Detected  Not Detected Final    Color, UA 04/19/2025 Yellow  Yellow, Straw Final    Appearance, UA 04/19/2025 Clear  Clear Final    pH, UA 04/19/2025 7.0  5.0 - 8.0 Final    Specific Gravity, UA 04/19/2025 1.008  1.005 - 1.030 Final    Glucose, UA 04/19/2025 Negative  Negative Final    Ketones, UA 04/19/2025 Negative  Negative Final    Bilirubin, UA 04/19/2025 Negative  Negative Final    Blood, UA 04/19/2025 Negative  Negative Final    Protein, UA 04/19/2025 Negative  Negative Final    Leuk Esterase, UA 04/19/2025 Trace (A)  Negative Final    Nitrite, UA 04/19/2025 Negative  Negative Final    Urobilinogen, UA 04/19/2025 0.2 E.U./dL  0.2 - 1.0 E.U./dL Final    WBC 04/18/2025 6.33  3.40 - 10.80 10*3/mm3 Final    RBC 04/18/2025 4.31  3.77 - 5.28 10*6/mm3 Final    Hemoglobin 04/18/2025 12.9  12.0 - 15.9 g/dL Final    Hematocrit 04/18/2025 39.3  34.0 - 46.6 % Final    MCV 04/18/2025 91.2  79.0 - 97.0 fL Final    MCH 04/18/2025 29.9  26.6 - 33.0 pg Final    MCHC 04/18/2025 32.8  31.5 - 35.7 g/dL Final    RDW 04/18/2025 13.5  12.3 - 15.4 % Final    RDW-SD 04/18/2025 45.6  37.0 - 54.0 fl Final    MPV 04/18/2025 9.8  6.0 - 12.0 fL Final    Platelets 04/18/2025 195  140 - 450 10*3/mm3 Final    Neutrophil % 04/18/2025 52.1  42.7 - 76.0 % Final    Lymphocyte % 04/18/2025 32.4  19.6 - 45.3 % Final    Monocyte % 04/18/2025 9.0  5.0 - 12.0 % Final    Eosinophil % 04/18/2025 5.2  0.3 - 6.2 % Final    Basophil % 04/18/2025 1.1  0.0 - 1.5 % Final    Immature Grans % 04/18/2025 0.2  0.0 - 0.5 % Final    Neutrophils, Absolute 04/18/2025 3.30  1.70 - 7.00 10*3/mm3 Final    Lymphocytes, Absolute 04/18/2025 2.05  0.70 - 3.10 10*3/mm3 Final    Monocytes, Absolute 04/18/2025 0.57  0.10 - 0.90 10*3/mm3 Final    Eosinophils, Absolute 04/18/2025 0.33  0.00 - 0.40 10*3/mm3 Final    Basophils, Absolute 04/18/2025 0.07  0.00 - 0.20 10*3/mm3 Final    Immature Grans, Absolute 04/18/2025  0.01  0.00 - 0.05 10*3/mm3 Final    nRBC 04/18/2025 0.0  0.0 - 0.2 /100 WBC Final    Extra Tube 04/18/2025 Hold for add-ons.   Final    Auto resulted.    Extra Tube 04/18/2025 Hold for add-ons.   Final    Auto resulted    HS Troponin T 04/18/2025 18 (H)  <14 ng/L Final    Troponin T Numeric Delta 04/18/2025 -1  ng/L Final    Troponin T % Delta 04/18/2025 -5  Abnormal if >/= 20% Final    WBC 04/19/2025 6.31  3.40 - 10.80 10*3/mm3 Final    RBC 04/19/2025 3.87  3.77 - 5.28 10*6/mm3 Final    Hemoglobin 04/19/2025 11.7 (L)  12.0 - 15.9 g/dL Final    Hematocrit 04/19/2025 35.6  34.0 - 46.6 % Final    MCV 04/19/2025 92.0  79.0 - 97.0 fL Final    MCH 04/19/2025 30.2  26.6 - 33.0 pg Final    MCHC 04/19/2025 32.9  31.5 - 35.7 g/dL Final    RDW 04/19/2025 13.8  12.3 - 15.4 % Final    RDW-SD 04/19/2025 46.6  37.0 - 54.0 fl Final    MPV 04/19/2025 10.4  6.0 - 12.0 fL Final    Platelets 04/19/2025 182  140 - 450 10*3/mm3 Final    Neutrophil % 04/19/2025 45.0  42.7 - 76.0 % Final    Lymphocyte % 04/19/2025 39.6  19.6 - 45.3 % Final    Monocyte % 04/19/2025 8.9  5.0 - 12.0 % Final    Eosinophil % 04/19/2025 5.1  0.3 - 6.2 % Final    Basophil % 04/19/2025 1.1  0.0 - 1.5 % Final    Immature Grans % 04/19/2025 0.3  0.0 - 0.5 % Final    Neutrophils, Absolute 04/19/2025 2.84  1.70 - 7.00 10*3/mm3 Final    Lymphocytes, Absolute 04/19/2025 2.50  0.70 - 3.10 10*3/mm3 Final    Monocytes, Absolute 04/19/2025 0.56  0.10 - 0.90 10*3/mm3 Final    Eosinophils, Absolute 04/19/2025 0.32  0.00 - 0.40 10*3/mm3 Final    Basophils, Absolute 04/19/2025 0.07  0.00 - 0.20 10*3/mm3 Final    Immature Grans, Absolute 04/19/2025 0.02  0.00 - 0.05 10*3/mm3 Final    nRBC 04/19/2025 0.0  0.0 - 0.2 /100 WBC Final    Glucose 04/19/2025 85  65 - 99 mg/dL Final    BUN 04/19/2025 18  8 - 23 mg/dL Final    Creatinine 04/19/2025 1.07 (H)  0.57 - 1.00 mg/dL Final    Sodium 04/19/2025 142  136 - 145 mmol/L Final    Potassium 04/19/2025 3.6  3.5 - 5.2 mmol/L Final     Chloride 04/19/2025 105  98 - 107 mmol/L Final    CO2 04/19/2025 27.0  22.0 - 29.0 mmol/L Final    Calcium 04/19/2025 9.3  8.6 - 10.5 mg/dL Final    Total Protein 04/19/2025 6.3  6.0 - 8.5 g/dL Final    Albumin 04/19/2025 3.9  3.5 - 5.2 g/dL Final    ALT (SGPT) 04/19/2025 16  1 - 33 U/L Final    AST (SGOT) 04/19/2025 24  1 - 32 U/L Final    Alkaline Phosphatase 04/19/2025 49  39 - 117 U/L Final    Total Bilirubin 04/19/2025 0.4  0.0 - 1.2 mg/dL Final    Globulin 04/19/2025 2.4  gm/dL Final    A/G Ratio 04/19/2025 1.6  g/dL Final    BUN/Creatinine Ratio 04/19/2025 16.8  7.0 - 25.0 Final    Anion Gap 04/19/2025 10.0  5.0 - 15.0 mmol/L Final    eGFR 04/19/2025 50.7 (L)  >60.0 mL/min/1.73 Final    Glucose 04/19/2025 91  70 - 105 mg/dL Final    Serial Number: [id number]:  [id number]    RBC, UA 04/19/2025 0-2  None Seen, 0-2 /HPF Final    WBC, UA 04/19/2025 0-2  None Seen, 0-2 /HPF Final    Urine culture not indicated.    Bacteria, UA 04/19/2025 None Seen  None Seen /HPF Final    Squamous Epithelial Cells, UA 04/19/2025 0-2  None Seen, 0-2 /HPF Final    Hyaline Casts, UA 04/19/2025 None Seen  None Seen /LPF Final    Methodology 04/19/2025 Automated Microscopy   Final    D-Dimer, Quantitative 04/19/2025 1.05 (H)  0.00 - 0.86 MCGFEU/mL Final         Review of Systems   Constitutional: Negative.    HENT: Negative.     Respiratory: Negative.     Cardiovascular: Negative.    Gastrointestinal: Negative.    Genitourinary: Negative.    Musculoskeletal:         Right pain lower clavicle upper breast area with movement   Skin: Negative.    Neurological: Negative.    Psychiatric/Behavioral: Negative.         Objective   Physical Exam  Constitutional:       Appearance: Normal appearance.   HENT:      Head: Normocephalic.   Cardiovascular:      Rate and Rhythm: Normal rate and regular rhythm.      Pulses: Normal pulses.      Heart sounds: Normal heart sounds.   Pulmonary:      Effort: Pulmonary effort is normal.      Breath  sounds: Normal breath sounds.   Abdominal:      General: Bowel sounds are normal.   Musculoskeletal:         General: Normal range of motion.   Skin:     General: Skin is warm.   Neurological:      General: No focal deficit present.      Mental Status: She is alert and oriented to person, place, and time.   Psychiatric:         Mood and Affect: Mood normal.         Behavior: Behavior normal.         Procedures    Assessment & Plan   Diagnoses and all orders for this visit:    1. Strain of abdominal muscle, initial encounter (Primary)    Other orders  -     predniSONE (DELTASONE) 10 MG tablet; Take 3 tablets by mouth Daily for 3 days, THEN 2 tablets Daily for 3 days, THEN 1 tablet Daily for 2 days, THEN 0.5 tablets Daily for 4 days.  Dispense: 19 tablet; Refill: 0  -     meloxicam (MOBIC) 7.5 MG tablet; Take 1 tablet by mouth 2 (Two) Times a Day.  Dispense: 14 tablet; Refill: 0           Current Outpatient Medications:     acetaminophen (TYLENOL) 325 MG tablet, Take 1 tablet by mouth Every 6 (Six) Hours As Needed., Disp: , Rfl:     amLODIPine (NORVASC) 5 MG tablet, Take 1 tablet by mouth Daily., Disp: 90 tablet, Rfl: 3    Ascorbic Acid (VITAMIN C) 500 MG capsule, Take 500 mg by mouth Daily., Disp: , Rfl:     aspirin 325 MG EC tablet, Take 1 tablet by mouth Every 6 (Six) Hours As Needed for Mild Pain., Disp: 30 tablet, Rfl: 2    calcium carbonate-vitamin d 600-400 MG-UNIT per tablet, Take 600 mg by mouth 2 (Two) Times a Day., Disp: , Rfl:     fluticasone (FLONASE) 50 MCG/ACT nasal spray, 2 sprays into the nostril(s) as directed by provider Daily. (Patient taking differently: Administer 2 sprays into the nostril(s) as directed by provider As Needed.), Disp: 9.9 mL, Rfl: 0    hydroxychloroquine (PLAQUENIL) 200 MG tablet, TAKE 1 TABLET BY MOUTH TWICE DAILY FOR RHEUMATOID ARTHRITIS, Disp: 180 tablet, Rfl: 0    levothyroxine (SYNTHROID, LEVOTHROID) 50 MCG tablet, Take 1 tablet by mouth once daily, Disp: 90 tablet, Rfl: 0     lisinopril-hydrochlorothiazide (PRINZIDE,ZESTORETIC) 20-25 MG per tablet, Take 1 tablet by mouth Daily., Disp: 90 tablet, Rfl: 3    Multiple Vitamin (MULTIVITAMIN) capsule, Take 1 capsule by mouth Daily., Disp: , Rfl:     potassium chloride 10 MEQ CR tablet, Take one tablet on Tuesday and saturday, Disp: 30 tablet, Rfl: 01    rosuvastatin (CRESTOR) 10 MG tablet, TAKE 1 TABLET BY MOUTH ONCE DAILY AT BEDTIME, Disp: 90 tablet, Rfl: 0    tiZANidine (ZANAFLEX) 4 MG tablet, Take 1 tablet by mouth Every 8 (Eight) Hours As Needed for Muscle Spasms., Disp: 12 tablet, Rfl: 0    meloxicam (MOBIC) 7.5 MG tablet, Take 1 tablet by mouth 2 (Two) Times a Day., Disp: 14 tablet, Rfl: 0    predniSONE (DELTASONE) 10 MG tablet, Take 3 tablets by mouth Daily for 3 days, THEN 2 tablets Daily for 3 days, THEN 1 tablet Daily for 2 days, THEN 0.5 tablets Daily for 4 days., Disp: 19 tablet, Rfl: 0           ROHAN Hernadez 4/28/2025 10:04 EDT  This note has been electronically signed

## 2025-04-22 NOTE — PATIENT INSTRUCTIONS
Meloxicam 7.5 mg twice daily with food x 1 week  Prednisone 10 mg taper dose daily as directed  Continue with muscle relaxer at night  Heating pad at night after taking off lidocaine patch  If no improvement by next week call office

## 2025-04-29 ENCOUNTER — READMISSION MANAGEMENT (OUTPATIENT)
Dept: CALL CENTER | Facility: HOSPITAL | Age: 85
End: 2025-04-29
Payer: MEDICARE

## 2025-04-29 NOTE — OUTREACH NOTE
Medical Week 2 Survey      Flowsheet Row Responses   [facility] patient discharged from? [facility]   Does the patient have one of the following disease processes/diagnoses(primary or secondary)? Other   Week 2 attempt successful? Yes   Call start time 1606   Discharge diagnosis Chest pain   Call end time 1608   Meds reviewed with patient/caregiver? Yes   Is the patient having any side effects they believe may be caused by any medication additions or changes? No   Does the patient have all medications ordered at discharge? Yes   Is the patient taking all medications as directed (includes completed medication regime)? Yes   Has the patient kept scheduled appointments due by today? Yes   Comments The patient reports no further chest pain, neck or jaw pain since DC. Pt denies dizziness or other issues.   What is the patient's perception of their health status since discharge? Returned to baseline/stable   Is the patient/caregiver able to teach back signs and symptoms related to disease process for when to call PCP? Yes   Is the patient/caregiver able to teach back signs and symptoms related to disease process for when to call 911? Yes   Additional teach back comments .   Week 2 Call Completed? Yes   Revoked No further contact(revokes)-requires comment   Call end time 1608            Kayce CALLAHAN - Registered Nurse

## 2025-06-02 RX ORDER — LEVOTHYROXINE SODIUM 50 UG/1
50 TABLET ORAL DAILY
Qty: 90 TABLET | Refills: 0 | Status: SHIPPED | OUTPATIENT
Start: 2025-06-02

## 2025-06-02 RX ORDER — ROSUVASTATIN CALCIUM 10 MG/1
10 TABLET, COATED ORAL
Qty: 90 TABLET | Refills: 0 | Status: SHIPPED | OUTPATIENT
Start: 2025-06-02

## 2025-06-02 RX ORDER — HYDROXYCHLOROQUINE SULFATE 200 MG/1
TABLET, FILM COATED ORAL
Qty: 180 TABLET | Refills: 0 | Status: SHIPPED | OUTPATIENT
Start: 2025-06-02 | End: 2025-08-31